# Patient Record
Sex: MALE | Race: WHITE | NOT HISPANIC OR LATINO | URBAN - METROPOLITAN AREA
[De-identification: names, ages, dates, MRNs, and addresses within clinical notes are randomized per-mention and may not be internally consistent; named-entity substitution may affect disease eponyms.]

---

## 2018-02-06 ENCOUNTER — INPATIENT (INPATIENT)
Facility: HOSPITAL | Age: 68
LOS: 8 days | Discharge: HOME | DRG: 65 | End: 2018-02-15
Attending: NEUROLOGICAL SURGERY | Admitting: NEUROLOGICAL SURGERY
Payer: MEDICAID

## 2018-02-06 VITALS
SYSTOLIC BLOOD PRESSURE: 137 MMHG | DIASTOLIC BLOOD PRESSURE: 66 MMHG | OXYGEN SATURATION: 99 % | RESPIRATION RATE: 13 BRPM | HEART RATE: 86 BPM | WEIGHT: 151.9 LBS

## 2018-02-06 DIAGNOSIS — Z90.49 ACQUIRED ABSENCE OF OTHER SPECIFIED PARTS OF DIGESTIVE TRACT: Chronic | ICD-10-CM

## 2018-02-06 DIAGNOSIS — I60.9 NONTRAUMATIC SUBARACHNOID HEMORRHAGE, UNSPECIFIED: ICD-10-CM

## 2018-02-06 LAB
ANION GAP SERPL CALC-SCNC: 10 MMOL/L — SIGNIFICANT CHANGE UP (ref 5–17)
ANION GAP SERPL CALC-SCNC: 13 MMOL/L — SIGNIFICANT CHANGE UP (ref 5–17)
APTT BLD: 35.6 SEC — SIGNIFICANT CHANGE UP (ref 27.5–37.4)
BLD GP AB SCN SERPL QL: NEGATIVE — SIGNIFICANT CHANGE UP
BUN SERPL-MCNC: 12 MG/DL — SIGNIFICANT CHANGE UP (ref 7–23)
BUN SERPL-MCNC: 12 MG/DL — SIGNIFICANT CHANGE UP (ref 7–23)
CALCIUM SERPL-MCNC: 8.5 MG/DL — SIGNIFICANT CHANGE UP (ref 8.4–10.5)
CALCIUM SERPL-MCNC: 8.9 MG/DL — SIGNIFICANT CHANGE UP (ref 8.4–10.5)
CHLORIDE SERPL-SCNC: 94 MMOL/L — LOW (ref 96–108)
CHLORIDE SERPL-SCNC: 97 MMOL/L — SIGNIFICANT CHANGE UP (ref 96–108)
CO2 SERPL-SCNC: 24 MMOL/L — SIGNIFICANT CHANGE UP (ref 22–31)
CO2 SERPL-SCNC: 25 MMOL/L — SIGNIFICANT CHANGE UP (ref 22–31)
CREAT SERPL-MCNC: 0.57 MG/DL — SIGNIFICANT CHANGE UP (ref 0.5–1.3)
CREAT SERPL-MCNC: 0.61 MG/DL — SIGNIFICANT CHANGE UP (ref 0.5–1.3)
GLUCOSE SERPL-MCNC: 257 MG/DL — HIGH (ref 70–99)
GLUCOSE SERPL-MCNC: 336 MG/DL — HIGH (ref 70–99)
HCT VFR BLD CALC: 35.2 % — LOW (ref 39–50)
HGB BLD-MCNC: 12.4 G/DL — LOW (ref 13–17)
INR BLD: 1.11 — SIGNIFICANT CHANGE UP (ref 0.88–1.16)
MAGNESIUM SERPL-MCNC: 2 MG/DL — SIGNIFICANT CHANGE UP (ref 1.6–2.6)
MCHC RBC-ENTMCNC: 31.9 PG — SIGNIFICANT CHANGE UP (ref 27–34)
MCHC RBC-ENTMCNC: 35.2 G/DL — SIGNIFICANT CHANGE UP (ref 32–36)
MCV RBC AUTO: 90.5 FL — SIGNIFICANT CHANGE UP (ref 80–100)
PCP SPEC-MCNC: SIGNIFICANT CHANGE UP
PHOSPHATE SERPL-MCNC: 3.1 MG/DL — SIGNIFICANT CHANGE UP (ref 2.5–4.5)
PLATELET # BLD AUTO: 265 K/UL — SIGNIFICANT CHANGE UP (ref 150–400)
POTASSIUM SERPL-MCNC: 3.9 MMOL/L — SIGNIFICANT CHANGE UP (ref 3.5–5.3)
POTASSIUM SERPL-MCNC: 4.2 MMOL/L — SIGNIFICANT CHANGE UP (ref 3.5–5.3)
POTASSIUM SERPL-SCNC: 3.9 MMOL/L — SIGNIFICANT CHANGE UP (ref 3.5–5.3)
POTASSIUM SERPL-SCNC: 4.2 MMOL/L — SIGNIFICANT CHANGE UP (ref 3.5–5.3)
PROTHROM AB SERPL-ACNC: 12.3 SEC — SIGNIFICANT CHANGE UP (ref 9.8–12.7)
RBC # BLD: 3.89 M/UL — LOW (ref 4.2–5.8)
RBC # FLD: 12 % — SIGNIFICANT CHANGE UP (ref 10.3–16.9)
RH IG SCN BLD-IMP: POSITIVE — SIGNIFICANT CHANGE UP
SODIUM SERPL-SCNC: 131 MMOL/L — LOW (ref 135–145)
SODIUM SERPL-SCNC: 132 MMOL/L — LOW (ref 135–145)
WBC # BLD: 12.8 K/UL — HIGH (ref 3.8–10.5)
WBC # FLD AUTO: 12.8 K/UL — HIGH (ref 3.8–10.5)

## 2018-02-06 PROCEDURE — 70496 CT ANGIOGRAPHY HEAD: CPT | Mod: 26

## 2018-02-06 PROCEDURE — 99291 CRITICAL CARE FIRST HOUR: CPT

## 2018-02-06 PROCEDURE — 36224 PLACE CATH CAROTD ART: CPT | Mod: 50

## 2018-02-06 PROCEDURE — 36226 PLACE CATH VERTEBRAL ART: CPT | Mod: 50

## 2018-02-06 PROCEDURE — 93306 TTE W/DOPPLER COMPLETE: CPT | Mod: 26

## 2018-02-06 PROCEDURE — 71045 X-RAY EXAM CHEST 1 VIEW: CPT | Mod: 26

## 2018-02-06 PROCEDURE — 70450 CT HEAD/BRAIN W/O DYE: CPT | Mod: 26,59

## 2018-02-06 PROCEDURE — 36227 PLACE CATH XTRNL CAROTID: CPT | Mod: 50

## 2018-02-06 PROCEDURE — 93970 EXTREMITY STUDY: CPT | Mod: 26

## 2018-02-06 RX ORDER — ACETAMINOPHEN 500 MG
1000 TABLET ORAL ONCE
Qty: 0 | Refills: 0 | Status: COMPLETED | OUTPATIENT
Start: 2018-02-06 | End: 2018-02-06

## 2018-02-06 RX ORDER — NICARDIPINE HYDROCHLORIDE 30 MG/1
5 CAPSULE, EXTENDED RELEASE ORAL
Qty: 40 | Refills: 0 | Status: DISCONTINUED | OUTPATIENT
Start: 2018-02-06 | End: 2018-02-07

## 2018-02-06 RX ORDER — TRAMADOL HYDROCHLORIDE 50 MG/1
50 TABLET ORAL EVERY 6 HOURS
Qty: 0 | Refills: 0 | Status: DISCONTINUED | OUTPATIENT
Start: 2018-02-06 | End: 2018-02-07

## 2018-02-06 RX ORDER — SENNA PLUS 8.6 MG/1
2 TABLET ORAL AT BEDTIME
Qty: 0 | Refills: 0 | Status: DISCONTINUED | OUTPATIENT
Start: 2018-02-06 | End: 2018-02-15

## 2018-02-06 RX ORDER — SODIUM CHLORIDE 5 G/100ML
1000 INJECTION, SOLUTION INTRAVENOUS
Qty: 0 | Refills: 0 | Status: DISCONTINUED | OUTPATIENT
Start: 2018-02-06 | End: 2018-02-06

## 2018-02-06 RX ORDER — VALPROIC ACID (AS SODIUM SALT) 250 MG/5ML
500 SOLUTION, ORAL ORAL EVERY 8 HOURS
Qty: 0 | Refills: 0 | Status: DISCONTINUED | OUTPATIENT
Start: 2018-02-06 | End: 2018-02-06

## 2018-02-06 RX ORDER — HYDROMORPHONE HYDROCHLORIDE 2 MG/ML
0.25 INJECTION INTRAMUSCULAR; INTRAVENOUS; SUBCUTANEOUS ONCE
Qty: 0 | Refills: 0 | Status: DISCONTINUED | OUTPATIENT
Start: 2018-02-06 | End: 2018-02-06

## 2018-02-06 RX ORDER — DESMOPRESSIN ACETATE 0.1 MG/1
28 TABLET ORAL ONCE
Qty: 0 | Refills: 0 | Status: COMPLETED | OUTPATIENT
Start: 2018-02-06 | End: 2018-02-06

## 2018-02-06 RX ORDER — ONDANSETRON 8 MG/1
4 TABLET, FILM COATED ORAL EVERY 6 HOURS
Qty: 0 | Refills: 0 | Status: DISCONTINUED | OUTPATIENT
Start: 2018-02-06 | End: 2018-02-15

## 2018-02-06 RX ORDER — SODIUM CHLORIDE 9 MG/ML
1000 INJECTION INTRAMUSCULAR; INTRAVENOUS; SUBCUTANEOUS
Qty: 0 | Refills: 0 | Status: DISCONTINUED | OUTPATIENT
Start: 2018-02-06 | End: 2018-02-06

## 2018-02-06 RX ORDER — INSULIN LISPRO 100/ML
VIAL (ML) SUBCUTANEOUS EVERY 6 HOURS
Qty: 0 | Refills: 0 | Status: DISCONTINUED | OUTPATIENT
Start: 2018-02-06 | End: 2018-02-14

## 2018-02-06 RX ORDER — SODIUM CHLORIDE 9 MG/ML
1000 INJECTION INTRAMUSCULAR; INTRAVENOUS; SUBCUTANEOUS
Qty: 0 | Refills: 0 | Status: DISCONTINUED | OUTPATIENT
Start: 2018-02-06 | End: 2018-02-07

## 2018-02-06 RX ORDER — LEVETIRACETAM 250 MG/1
500 TABLET, FILM COATED ORAL
Qty: 0 | Refills: 0 | Status: DISCONTINUED | OUTPATIENT
Start: 2018-02-06 | End: 2018-02-06

## 2018-02-06 RX ORDER — HUMAN INSULIN 100 [IU]/ML
15 INJECTION, SUSPENSION SUBCUTANEOUS ONCE
Qty: 0 | Refills: 0 | Status: DISCONTINUED | OUTPATIENT
Start: 2018-02-06 | End: 2018-02-06

## 2018-02-06 RX ORDER — LEVETIRACETAM 250 MG/1
500 TABLET, FILM COATED ORAL
Qty: 0 | Refills: 0 | Status: DISCONTINUED | OUTPATIENT
Start: 2018-02-06 | End: 2018-02-07

## 2018-02-06 RX ORDER — INFLUENZA VIRUS VACCINE 15; 15; 15; 15 UG/.5ML; UG/.5ML; UG/.5ML; UG/.5ML
0.5 SUSPENSION INTRAMUSCULAR ONCE
Qty: 0 | Refills: 0 | Status: DISCONTINUED | OUTPATIENT
Start: 2018-02-06 | End: 2018-02-15

## 2018-02-06 RX ORDER — NICARDIPINE HYDROCHLORIDE 30 MG/1
5 CAPSULE, EXTENDED RELEASE ORAL
Qty: 40 | Refills: 0 | Status: DISCONTINUED | OUTPATIENT
Start: 2018-02-06 | End: 2018-02-06

## 2018-02-06 RX ORDER — INSULIN GLARGINE 100 [IU]/ML
15 INJECTION, SOLUTION SUBCUTANEOUS AT BEDTIME
Qty: 0 | Refills: 0 | Status: DISCONTINUED | OUTPATIENT
Start: 2018-02-06 | End: 2018-02-09

## 2018-02-06 RX ORDER — NIMODIPINE 60 MG/10ML
60 SOLUTION ORAL EVERY 4 HOURS
Qty: 0 | Refills: 0 | Status: DISCONTINUED | OUTPATIENT
Start: 2018-02-06 | End: 2018-02-07

## 2018-02-06 RX ORDER — DOCUSATE SODIUM 100 MG
100 CAPSULE ORAL THREE TIMES A DAY
Qty: 0 | Refills: 0 | Status: DISCONTINUED | OUTPATIENT
Start: 2018-02-06 | End: 2018-02-15

## 2018-02-06 RX ORDER — METOCLOPRAMIDE HCL 10 MG
5 TABLET ORAL EVERY 6 HOURS
Qty: 0 | Refills: 0 | Status: DISCONTINUED | OUTPATIENT
Start: 2018-02-06 | End: 2018-02-15

## 2018-02-06 RX ADMIN — Medication 400 MILLIGRAM(S): at 16:26

## 2018-02-06 RX ADMIN — Medication 1000 MILLIGRAM(S): at 08:06

## 2018-02-06 RX ADMIN — NICARDIPINE HYDROCHLORIDE 25 MG/HR: 30 CAPSULE, EXTENDED RELEASE ORAL at 10:00

## 2018-02-06 RX ADMIN — NIMODIPINE 60 MILLIGRAM(S): 60 SOLUTION ORAL at 06:54

## 2018-02-06 RX ADMIN — Medication 2: at 18:00

## 2018-02-06 RX ADMIN — Medication 8: at 06:54

## 2018-02-06 RX ADMIN — Medication 1 CAPSULE(S): at 19:34

## 2018-02-06 RX ADMIN — NIMODIPINE 60 MILLIGRAM(S): 60 SOLUTION ORAL at 21:14

## 2018-02-06 RX ADMIN — LEVETIRACETAM 500 MILLIGRAM(S): 250 TABLET, FILM COATED ORAL at 18:46

## 2018-02-06 RX ADMIN — NIMODIPINE 60 MILLIGRAM(S): 60 SOLUTION ORAL at 10:00

## 2018-02-06 RX ADMIN — TRAMADOL HYDROCHLORIDE 50 MILLIGRAM(S): 50 TABLET ORAL at 20:08

## 2018-02-06 RX ADMIN — Medication 100 MILLIGRAM(S): at 21:15

## 2018-02-06 RX ADMIN — SODIUM CHLORIDE 75 MILLILITER(S): 9 INJECTION INTRAMUSCULAR; INTRAVENOUS; SUBCUTANEOUS at 10:00

## 2018-02-06 RX ADMIN — Medication 1 CAPSULE(S): at 11:17

## 2018-02-06 RX ADMIN — Medication 4: at 12:00

## 2018-02-06 RX ADMIN — Medication 1 CAPSULE(S): at 10:36

## 2018-02-06 RX ADMIN — HYDROMORPHONE HYDROCHLORIDE 0.25 MILLIGRAM(S): 2 INJECTION INTRAMUSCULAR; INTRAVENOUS; SUBCUTANEOUS at 21:05

## 2018-02-06 RX ADMIN — Medication 1 CAPSULE(S): at 18:47

## 2018-02-06 RX ADMIN — SODIUM CHLORIDE 30 MILLILITER(S): 5 INJECTION, SOLUTION INTRAVENOUS at 07:25

## 2018-02-06 RX ADMIN — DESMOPRESSIN ACETATE 228 MICROGRAM(S): 0.1 TABLET ORAL at 06:45

## 2018-02-06 RX ADMIN — Medication 27.5 MILLIGRAM(S): at 16:15

## 2018-02-06 RX ADMIN — HYDROMORPHONE HYDROCHLORIDE 0.25 MILLIGRAM(S): 2 INJECTION INTRAMUSCULAR; INTRAVENOUS; SUBCUTANEOUS at 21:15

## 2018-02-06 RX ADMIN — INSULIN GLARGINE 15 UNIT(S): 100 INJECTION, SOLUTION SUBCUTANEOUS at 21:15

## 2018-02-06 RX ADMIN — NICARDIPINE HYDROCHLORIDE 25 MG/HR: 30 CAPSULE, EXTENDED RELEASE ORAL at 06:23

## 2018-02-06 RX ADMIN — Medication 1000 MILLIGRAM(S): at 17:15

## 2018-02-06 RX ADMIN — SENNA PLUS 2 TABLET(S): 8.6 TABLET ORAL at 21:15

## 2018-02-06 RX ADMIN — TRAMADOL HYDROCHLORIDE 50 MILLIGRAM(S): 50 TABLET ORAL at 19:35

## 2018-02-06 RX ADMIN — Medication 400 MILLIGRAM(S): at 06:20

## 2018-02-06 RX ADMIN — NIMODIPINE 60 MILLIGRAM(S): 60 SOLUTION ORAL at 18:47

## 2018-02-06 NOTE — PROGRESS NOTE ADULT - SUBJECTIVE AND OBJECTIVE BOX
NEUROCRITICAL CARE PROGRESS NOTE    BARBARA LOMBARDO   MRN-[id number]  Summary:  /  HPI:  66yo male presented to [facility] after sudden onset of severe HA at 10pm followed by collapsing. PT drank beer and took aspirin for the HA and then passed out- pt states his knees gave out but he did not lose conciousness. +photophobia +nuchal rigidity. No N/V, no fever/chills. No head trauma. CTH done in [facility] showed SAH HH2 [place] 4. Pt was transferred to [facility] for further care. (06 Feb 2018 06:14)  Has been having headaches for > 1 month.  He has been taking Advil and ASA recently.  He states he drinks 3 beers per week.  ETOH was 0.03 from [facility] (app).     S/Overnight events:  transferred from [facility]  HA better than yesterday.     Vital Signs Last 24 Hrs  T(C): 35.7 (06 Feb 2018 09:05), Max: 35.8 (06 Feb 2018 05:00)  T(F): 96.2 (06 Feb 2018 09:05), Max: 96.5 (06 Feb 2018 05:00)  HR: 76 (06 Feb 2018 13:00) (72 - 88)  BP: 124/56 (06 Feb 2018 13:00) (107/55 - 144/70)  BP(mean): 79 (06 Feb 2018 13:00) (68 - 93)  RR: 19 (06 Feb 2018 13:00) (12 - 32)  SpO2: 99% (06 Feb 2018 13:00) (95% - 100%)    I&O's Detail    05 Feb 2018 07:01  -  06 Feb 2018 07:00  --------------------------------------------------------  IN:    IV PiggyBack: 150 mL    niCARdipine Infusion: 127.5 mL    sodium chloride 2%: 30 mL  Total IN: 307.5 mL    OUT:    Voided: 700 mL  Total OUT: 700 mL    Total NET: -392.5 mL    06 Feb 2018 07:01  -  06 Feb 201 14:06  --------------------------------------------------------  IN:    niCARdipine Infusion: 87.5 mL    Platelets - Single Donor: 223 mL    sodium chloride 0.9%.: 300 mL    sodium chloride 2%: 60 mL  Total IN: 670.5 mL    OUT:    Voided: 325 mL  Total OUT: 325 mL    Total NET: 345.5 mL      NEURO EXAM    MS: Patient is awake, alert, fully oriented  CN:  pupils 2-3mm equal and briskly reactive to light, EOMs intact  MOTOR: muscle strength 5/5 on all 4 extremities  COORD:  FTN intact, HTS intact    LABS:                        12.4   12.8  )-----------( 265      ( 06 Feb 2018 05:09 )             35.2     02-06    Sodium, Serum: 131 mmol/L (02.06.18 @ 05:09)  132<L>  |  97  |  12  ----------------------------<  257<H>  4.2   |  25  |  0.61    Ca    8.5      06 Feb 2018 11:44  Phos  3.1     02-06  Mg     2.0     02-06    PT/INR - ( 06 Feb 2018 05:09 )   PT: 12.3 sec;   INR: 1.11     PTT - ( 06 Feb 2018 05:09 )  PTT:35.6 sec    CAPILLARY BLOOD GLUCOS    POCT Blood Glucose.: 246 mg/dL (06 Feb 2018 11:25)  POCT Blood Glucose.: 237 mg/dL (06 Feb 2018 10:19)  POCT Blood Glucose.: 305 mg/dL (06 Feb 2018 06:50)    Hemoglobin A1C, Whole Blood (02.06.18 @ 05:09)    Hemoglobin A1C, Whole Blood: 8.4:       Allergies    No Known Allergies    Intolerances    MEDICATIONS:  Antibiotics:    Neuro:  acetaminophen 300 mG/butalbital 50 mG/ caffeine 40 mG 1 Capsule(s) Oral every 6 hours PRN  metoclopramide Injectable 5 milliGRAM(s) IV Push every 6 hours PRN  ondansetron Injectable 4 milliGRAM(s) IV Push every 6 hours PRN  traMADol 50 milliGRAM(s) Oral every 6 hours PRN  valproate sodium IVPB 500 milliGRAM(s) IV Intermittent every 8 hours    Anticoagulation:    OTHER:  docusate sodium 100 milliGRAM(s) Oral three times a day  influenza   Vaccine 0.5 milliLiter(s) IntraMuscular once  insulin lispro (HumaLOG) corrective regimen sliding scale   SubCutaneous every 6 hours  insulin NPH human recombinant 15 Unit(s) SubCutaneous once  niCARdipine Infusion 5 mG/Hr IV Continuous <Continuous>  niMODipine 60 milliGRAM(s) Oral every 4 hours  senna 2 Tablet(s) Oral at bedtime    IVF:  sodium chloride 0.9%. 1000 milliLiter(s) IV Continuous <Continuous>

## 2018-02-06 NOTE — H&P ADULT - PROBLEM SELECTOR PLAN 1
-CTH/CTA  -Strict BP control <140  -start 2% for hyponatremia  -neurochecks q1hr  -ddavp and platelet transfusion for aspirin use  -tylenol prn HA  -nimodipine  -SAH precautions  -D/W

## 2018-02-06 NOTE — PROGRESS NOTE ADULT - ASSESSMENT
Post procedure angio day 1, negative for avm or aneurysm  -q1h neuro checks and NV checks  -flat x 4 hrs  -pain control as ordered  -keppra x 10 days  -ADAT  -monitor hyponatremia  -NS hydration  -SCDs only for now  -dw Dr. Rodriguez and ICU house staff

## 2018-02-06 NOTE — H&P ADULT - HISTORY OF PRESENT ILLNESS
68yo male presented to [facility] after sudden onset of severe HA at 10pm followed by collapsing. PT drank beer and took aspirin for the HA and then passed out- pt states his knees gave out but he did not lose conciousness. +photophobia +nuchal rigidity. No N/V, no fever/chills. No head trauma. CTH done in [facility] showed SAH HH2 [place] 4. Pt was transferred to [facility] for further care.

## 2018-02-06 NOTE — H&P ADULT - NSHPPHYSICALEXAM_GEN_ALL_CORE
Lethargic but easily arousable  AxOX3  PERRL  EOMI  Nuchal rigidity  No pronator drift  5/5 motor x4ext  sensory intact

## 2018-02-06 NOTE — PROGRESS NOTE ADULT - SUBJECTIVE AND OBJECTIVE BOX
Post op Note    Dx: traumatic SAH/IVH    67y    Male   s/p cath angio today and brought back to ICU for observation post op. Patient reports moderate headache post procedure. Denies any CP, SOB, nausea, vomiting.            T(C): 35.7 (02-06-18 @ 09:05), Max: 35.8 (02-06-18 @ 05:00)  HR: 66 (02-06-18 @ 16:34) (62 - 88)  BP: 122/47 (02-06-18 @ 16:34) (101/41 - 144/70)  RR: 18 (02-06-18 @ 16:34) (12 - 32)  SpO2: 100% (02-06-18 @ 16:34) (95% - 100%)  Wt(kg): --      Physical exam  Awake, alert, oriented x 2, self and date, PERRL, EOMI  Follows commands, speech clear  SIDDIQUI X4 with good strength   groin site dressing: C/D/I  pulses intact distally

## 2018-02-06 NOTE — BRIEF OPERATIVE NOTE - PRE-OP DX
Subarachnoid hemorrhage following injury, no loss of consciousness, initial encounter  02/06/2018  traumatic SAH  Active  Isaac Brown

## 2018-02-06 NOTE — BRIEF OPERATIVE NOTE - PROCEDURE
<<-----Click on this checkbox to enter Procedure Cerebral angiography  02/06/2018  diagnostic femoral cerebral catheter angiogram  Active  ASALVATORE

## 2018-02-06 NOTE — PATIENT PROFILE ADULT. - DOES PATIENT HAVE ADVANCE DIRECTIVE
Detail Level: Detailed
Consent: The patient's consent was obtained including but not limited to risks of crusting, scabbing, blistering, scarring, darker or lighter pigmentary change, recurrence, incomplete removal and infection.
Duration Of Freeze Thaw-Cycle (Seconds): 2
Render Post-Care Instructions In Note?: no
Number Of Freeze-Thaw Cycles: 1 freeze-thaw cycle
Post-Care Instructions: I reviewed with the patient in detail post-care instructions. Patient is to wear sunprotection, and avoid picking at any of the treated lesions. Pt may apply Vaseline to crusted or scabbing areas.
No

## 2018-02-06 NOTE — PROGRESS NOTE ADULT - ASSESSMENT
66yo M w SAH HH2, FG 4, neuro intact, c/o posterior headache and neck pain that is improved compared to yesterday.  CTA head/neck negative for aneurysm.     Keep in ICU for q 1 hr neuro checks, monitor for hydrocephalus and rebleed  keep SBP < 140, labetalol, hydralazine and cardene prn  4 vessel cath angiogram today  7 days of keppra  HA cocktail;  Reglan, VPA, Fioricet, Mg, Tramadol x 3 days, no narcotics  keep up right  NPO  check FLP, A1c= 9, TSH, U tox, EKG, TTE  Nimodipine, unless angiogram negative for anuerysm    Prophylaxis:  SUP  [ ] H2  [ ] PPI;  VTE/PE  [] heparin/Lovenox   [x] SCDs   Rehab:  [x] SLP  [x] PT/OT  [ ] Cog eval  Social: will update family  Dispo: [x] ICU  [ ] Step-down [ ] [facility]  Code Status:  [x] Full [ ] DNR/DNI

## 2018-02-06 NOTE — H&P ADULT - ATTENDING COMMENTS
Patient seen and examined by me. Agree with above. Catheter angiogram negative for SAH bleed source. Agree with ICU care, spasm/hydro watch.    Andi Slaughter M.D.  Neurosurgery

## 2018-02-07 LAB
ANION GAP SERPL CALC-SCNC: 12 MMOL/L — SIGNIFICANT CHANGE UP (ref 5–17)
BUN SERPL-MCNC: 10 MG/DL — SIGNIFICANT CHANGE UP (ref 7–23)
CALCIUM SERPL-MCNC: 8.5 MG/DL — SIGNIFICANT CHANGE UP (ref 8.4–10.5)
CHLORIDE SERPL-SCNC: 97 MMOL/L — SIGNIFICANT CHANGE UP (ref 96–108)
CO2 SERPL-SCNC: 25 MMOL/L — SIGNIFICANT CHANGE UP (ref 22–31)
CREAT SERPL-MCNC: 0.56 MG/DL — SIGNIFICANT CHANGE UP (ref 0.5–1.3)
GLUCOSE SERPL-MCNC: 174 MG/DL — HIGH (ref 70–99)
HBA1C BLD-MCNC: 8.5 % — HIGH (ref 4–5.6)
MAGNESIUM SERPL-MCNC: 2 MG/DL — SIGNIFICANT CHANGE UP (ref 1.6–2.6)
PHOSPHATE SERPL-MCNC: 2.9 MG/DL — SIGNIFICANT CHANGE UP (ref 2.5–4.5)
POTASSIUM SERPL-MCNC: 3.5 MMOL/L — SIGNIFICANT CHANGE UP (ref 3.5–5.3)
POTASSIUM SERPL-SCNC: 3.5 MMOL/L — SIGNIFICANT CHANGE UP (ref 3.5–5.3)
SODIUM SERPL-SCNC: 134 MMOL/L — LOW (ref 135–145)

## 2018-02-07 PROCEDURE — 70450 CT HEAD/BRAIN W/O DYE: CPT | Mod: 26

## 2018-02-07 PROCEDURE — 72156 MRI NECK SPINE W/O & W/DYE: CPT | Mod: 26

## 2018-02-07 PROCEDURE — 99222 1ST HOSP IP/OBS MODERATE 55: CPT

## 2018-02-07 PROCEDURE — 99233 SBSQ HOSP IP/OBS HIGH 50: CPT | Mod: 25

## 2018-02-07 PROCEDURE — 70553 MRI BRAIN STEM W/O & W/DYE: CPT | Mod: 26

## 2018-02-07 RX ORDER — VALPROIC ACID (AS SODIUM SALT) 250 MG/5ML
500 SOLUTION, ORAL ORAL EVERY 8 HOURS
Qty: 0 | Refills: 0 | Status: DISCONTINUED | OUTPATIENT
Start: 2018-02-07 | End: 2018-02-08

## 2018-02-07 RX ORDER — NIMODIPINE 60 MG/10ML
60 SOLUTION ORAL EVERY 4 HOURS
Qty: 0 | Refills: 0 | Status: DISCONTINUED | OUTPATIENT
Start: 2018-02-07 | End: 2018-02-13

## 2018-02-07 RX ORDER — HYDRALAZINE HCL 50 MG
10 TABLET ORAL EVERY 6 HOURS
Qty: 0 | Refills: 0 | Status: DISCONTINUED | OUTPATIENT
Start: 2018-02-07 | End: 2018-02-15

## 2018-02-07 RX ORDER — HEPARIN SODIUM 5000 [USP'U]/ML
5000 INJECTION INTRAVENOUS; SUBCUTANEOUS EVERY 8 HOURS
Qty: 0 | Refills: 0 | Status: DISCONTINUED | OUTPATIENT
Start: 2018-02-07 | End: 2018-02-15

## 2018-02-07 RX ORDER — HYDROMORPHONE HYDROCHLORIDE 2 MG/ML
0.5 INJECTION INTRAMUSCULAR; INTRAVENOUS; SUBCUTANEOUS ONCE
Qty: 0 | Refills: 0 | Status: DISCONTINUED | OUTPATIENT
Start: 2018-02-07 | End: 2018-02-07

## 2018-02-07 RX ORDER — LEVETIRACETAM 250 MG/1
500 TABLET, FILM COATED ORAL
Qty: 0 | Refills: 0 | Status: DISCONTINUED | OUTPATIENT
Start: 2018-02-07 | End: 2018-02-08

## 2018-02-07 RX ORDER — BUTALBITAL/ASPIRIN/CAFFEINE 50-325-40
1 TABLET ORAL EVERY 4 HOURS
Qty: 0 | Refills: 0 | Status: DISCONTINUED | OUTPATIENT
Start: 2018-02-07 | End: 2018-02-08

## 2018-02-07 RX ORDER — SODIUM CHLORIDE 9 MG/ML
1000 INJECTION INTRAMUSCULAR; INTRAVENOUS; SUBCUTANEOUS
Qty: 0 | Refills: 0 | Status: DISCONTINUED | OUTPATIENT
Start: 2018-02-07 | End: 2018-02-08

## 2018-02-07 RX ORDER — LABETALOL HCL 100 MG
10 TABLET ORAL EVERY 6 HOURS
Qty: 0 | Refills: 0 | Status: DISCONTINUED | OUTPATIENT
Start: 2018-02-07 | End: 2018-02-14

## 2018-02-07 RX ORDER — TRAMADOL HYDROCHLORIDE 50 MG/1
50 TABLET ORAL EVERY 6 HOURS
Qty: 0 | Refills: 0 | Status: DISCONTINUED | OUTPATIENT
Start: 2018-02-07 | End: 2018-02-08

## 2018-02-07 RX ADMIN — Medication 27.5 MILLIGRAM(S): at 22:07

## 2018-02-07 RX ADMIN — TRAMADOL HYDROCHLORIDE 50 MILLIGRAM(S): 50 TABLET ORAL at 19:09

## 2018-02-07 RX ADMIN — Medication 1 CAPSULE(S): at 19:10

## 2018-02-07 RX ADMIN — Medication 1 CAPSULE(S): at 04:50

## 2018-02-07 RX ADMIN — Medication 1 CAPSULE(S): at 21:23

## 2018-02-07 RX ADMIN — Medication 1 CAPSULE(S): at 03:56

## 2018-02-07 RX ADMIN — NIMODIPINE 60 MILLIGRAM(S): 60 SOLUTION ORAL at 14:57

## 2018-02-07 RX ADMIN — TRAMADOL HYDROCHLORIDE 50 MILLIGRAM(S): 50 TABLET ORAL at 23:55

## 2018-02-07 RX ADMIN — HYDROMORPHONE HYDROCHLORIDE 0.5 MILLIGRAM(S): 2 INJECTION INTRAMUSCULAR; INTRAVENOUS; SUBCUTANEOUS at 16:31

## 2018-02-07 RX ADMIN — Medication 2: at 06:23

## 2018-02-07 RX ADMIN — Medication 100 MILLIGRAM(S): at 06:22

## 2018-02-07 RX ADMIN — HYDROMORPHONE HYDROCHLORIDE 0.5 MILLIGRAM(S): 2 INJECTION INTRAMUSCULAR; INTRAVENOUS; SUBCUTANEOUS at 15:51

## 2018-02-07 RX ADMIN — NIMODIPINE 60 MILLIGRAM(S): 60 SOLUTION ORAL at 22:03

## 2018-02-07 RX ADMIN — LEVETIRACETAM 500 MILLIGRAM(S): 250 TABLET, FILM COATED ORAL at 06:22

## 2018-02-07 RX ADMIN — Medication 4: at 11:30

## 2018-02-07 RX ADMIN — Medication 2: at 00:22

## 2018-02-07 RX ADMIN — Medication 100 MILLIGRAM(S): at 19:05

## 2018-02-07 RX ADMIN — NIMODIPINE 60 MILLIGRAM(S): 60 SOLUTION ORAL at 01:14

## 2018-02-07 RX ADMIN — HEPARIN SODIUM 5000 UNIT(S): 5000 INJECTION INTRAVENOUS; SUBCUTANEOUS at 22:05

## 2018-02-07 RX ADMIN — SENNA PLUS 2 TABLET(S): 8.6 TABLET ORAL at 22:04

## 2018-02-07 RX ADMIN — TRAMADOL HYDROCHLORIDE 50 MILLIGRAM(S): 50 TABLET ORAL at 21:23

## 2018-02-07 RX ADMIN — Medication 100 MILLIGRAM(S): at 22:06

## 2018-02-07 RX ADMIN — NIMODIPINE 60 MILLIGRAM(S): 60 SOLUTION ORAL at 06:22

## 2018-02-07 RX ADMIN — Medication 10 MILLIGRAM(S): at 12:09

## 2018-02-07 RX ADMIN — TRAMADOL HYDROCHLORIDE 50 MILLIGRAM(S): 50 TABLET ORAL at 23:00

## 2018-02-07 RX ADMIN — Medication 1 CAPSULE(S): at 21:22

## 2018-02-07 RX ADMIN — Medication 2: at 19:06

## 2018-02-07 RX ADMIN — INSULIN GLARGINE 15 UNIT(S): 100 INJECTION, SOLUTION SUBCUTANEOUS at 22:05

## 2018-02-07 RX ADMIN — NIMODIPINE 60 MILLIGRAM(S): 60 SOLUTION ORAL at 19:05

## 2018-02-07 RX ADMIN — LEVETIRACETAM 500 MILLIGRAM(S): 250 TABLET, FILM COATED ORAL at 19:08

## 2018-02-07 RX ADMIN — Medication 1 CAPSULE(S): at 12:15

## 2018-02-07 RX ADMIN — Medication 2: at 22:08

## 2018-02-07 NOTE — OCCUPATIONAL THERAPY INITIAL EVALUATION ADULT - PERTINENT HX OF CURRENT PROBLEM, REHAB EVAL
68yo M w SAH HH2, FG 4, neuro intact, c/o posterior headache and neck pain that is improved compared to yesterday.  CTA head/neck negative for aneurysm.

## 2018-02-07 NOTE — PROGRESS NOTE ADULT - SUBJECTIVE AND OBJECTIVE BOX
NEUROCRITICAL CARE PROGRESS NOTE    BARBARA LOMBARDO   MRN-[id number]  Summary:  /  HPI:  68yo male presented to [facility] after sudden onset of severe HA at 10pm followed by collapsing. PT drank beer and took aspirin for the HA and then passed out- pt states his knees gave out but he did not lose conciousness. +photophobia +nuchal rigidity. No N/V, no fever/chills. No head trauma. CTH done in [facility] showed SAH HH2 [place] 4. Pt was transferred to [facility] for further care. (06 Feb 2018 06:14)    S/Overnight events:  HA improved from yesterday. still severe and uncomfortable     Vital Signs Last 24 Hrs  T(C): 36.2 (07 Feb 2018 08:30), Max: 36.6 (06 Feb 2018 17:01)  T(F): 97.2 (07 Feb 2018 08:30), Max: 97.9 (06 Feb 2018 17:01)  HR: 64 (07 Feb 2018 13:00) (60 - 74)  BP: 150/79 (07 Feb 2018 13:00) (101/41 - 150/79)  BP(mean): 117 (07 Feb 2018 13:00) (54 - 117)  RR: 13 (07 Feb 2018 13:00) (10 - 27)  SpO2: 97% (07 Feb 2018 13:00) (95% - 100%)    I&O's Detail    06 Feb 2018 07:01  -  07 Feb 2018 07:00  --------------------------------------------------------  IN:    IV PiggyBack: 100 mL    niCARdipine Infusion: 335 mL    Platelets - Single Donor: 223 mL    sodium chloride 0.9%: 1650 mL    sodium chloride 2%: 60 mL    Solution: 110 mL  Total IN: 2478 mL    OUT:    Indwelling Catheter - Urethral: 1225 mL    Voided: 595 mL  Total OUT: 1820 mL    Total NET: 658 mL    LABS:                        12.1   11.7  )-----------( 264      ( 07 Feb 2018 13:01 )             34.6     02-07    134<L>  |  97  |  10  ----------------------------<  174<H>  3.5   |  25  |  0.56    Ca    8.5      07 Feb 2018 06:11  Phos  2.9     02-07  Mg     2.0     02-07      PT/INR - ( 06 Feb 2018 05:09 )   PT: 12.3 sec;   INR: 1.11       PTT - ( 06 Feb 2018 05:09 )  PTT:35.6 sec    CAPILLARY BLOOD GLUCOSE    POCT Blood Glucose.: 204 mg/dL (07 Feb 2018 10:55)  POCT Blood Glucose.: 177 mg/dL (07 Feb 2018 06:15)  POCT Blood Glucose.: 177 mg/dL (07 Feb 2018 01:16)  POCT Blood Glucose.: 175 mg/dL (06 Feb 2018 21:23)  POCT Blood Glucose.: 176 mg/dL (06 Feb 2018 17:22)  POCT Blood Glucose.: 168 mg/dL (06 Feb 2018 15:09)    Neuro:  acetaminophen 300 mG/butalbital 50 mG/ caffeine 40 mG 1 Capsule(s) Oral every 6 hours PRN  levETIRAcetam 500 milliGRAM(s) Oral two times a day  metoclopramide Injectable 5 milliGRAM(s) IV Push every 6 hours PRN  ondansetron Injectable 4 milliGRAM(s) IV Push every 6 hours PRN  traMADol 50 milliGRAM(s) Oral every 6 hours PRN    Anticoagulation:    OTHER:  docusate sodium 100 milliGRAM(s) Oral three times a day  hydrALAZINE Injectable 10 milliGRAM(s) IV Push every 6 hours PRN  influenza   Vaccine 0.5 milliLiter(s) IntraMuscular once  insulin glargine Injectable (LANTUS) 15 Unit(s) SubCutaneous at bedtime  insulin lispro (HumaLOG) corrective regimen sliding scale   SubCutaneous every 6 hours  labetalol Injectable 10 milliGRAM(s) IV Push every 6 hours PRN  niMODipine 60 milliGRAM(s) Oral every 4 hours  senna 2 Tablet(s) Oral at bedtime    IVF:  sodium chloride 0.9%. 1000 milliLiter(s) IV Continuous <Continuous>    NEURO EXAM    MS: Patient is awake, alert, fully oriented  CN:  pupils 2-3mm equal and briskly reactive to light, EOMs intact  MOTOR: muscle strength 5/5 on all 4 extremities  COORD:  FTN intact, HTS intact

## 2018-02-07 NOTE — PHYSICAL THERAPY INITIAL EVALUATION ADULT - ADDITIONAL COMMENTS
Patient reports independent with all ADLs/IADLs prior to admission. No HHA. Denies history of mechanical falls throughout. Patient reports independent with all ADLs/IADLs prior to admission. No HHA. Denies history of mechanical falls throughout. Patient is (R) hand dominant, wears visual aides occasionally.

## 2018-02-07 NOTE — PROGRESS NOTE ADULT - SUBJECTIVE AND OBJECTIVE BOX
HPI:  66yo male presented to [facility] after sudden onset of severe HA at 10pm followed by collapsing. PT drank beer and took aspirin for the HA and then passed out- pt states his knees gave out but he did not lose conciousness. +photophobia +nuchal rigidity. No N/V, no fever/chills. No head trauma. CTH done in [facility] showed SAH HH2 [place] 4. Pt was transferred to [facility] for further care. (06 Feb 2018 06:14)    s/p angio yesterday, neg for AVM and vasc abnormality  reports headaches consistently overnight  hyperglycemic yesterday started on lantus  denies any blurry vision, n/v, CP, SOB or difficulty breathing    OVERNIGHT EVENTS:  Vital Signs Last 24 Hrs  T(C): 36.1 (07 Feb 2018 06:10), Max: 36.6 (06 Feb 2018 17:01)  T(F): 96.9 (07 Feb 2018 06:10), Max: 97.9 (06 Feb 2018 17:01)  HR: 62 (07 Feb 2018 05:00) (62 - 88)  BP: 109/47 (07 Feb 2018 05:00) (101/41 - 134/69)  BP(mean): 81 (07 Feb 2018 05:00) (54 - 96)  RR: 10 (07 Feb 2018 05:00) (10 - 25)  SpO2: 100% (07 Feb 2018 05:00) (95% - 100%)    I&O's Detail    06 Feb 2018 07:01  -  07 Feb 2018 07:00  --------------------------------------------------------  IN:    IV PiggyBack: 100 mL    niCARdipine Infusion: 310 mL    Platelets - Single Donor: 223 mL    sodium chloride 0.9%.: 1500 mL    sodium chloride 2%: 60 mL    Solution: 110 mL  Total IN: 2303 mL    OUT:    Indwelling Catheter - Urethral: 1225 mL    Voided: 595 mL  Total OUT: 1820 mL    Total NET: 483 mL        I&O's Summary    06 Feb 2018 07:01  -  07 Feb 2018 07:00  --------------------------------------------------------  IN: 2303 mL / OUT: 1820 mL / NET: 483 mL        PHYSICAL EXAM:  Neurological:  A&O x 3, mod discomfort  EOMI, PERRL  face symmetric  SIDDIQUI x 4, neg drift  motor 5/5 throughout  groin site c/d/i, dsg removed, distal pulses intact    TUBES/LINES:  piv    DIET:  [] NPO  [x] Mechanical  [] Tube feeds    LABS:                        12.4   12.8  )-----------( 265      ( 06 Feb 2018 05:09 )             35.2     02-06    132<L>  |  97  |  12  ----------------------------<  257<H>  4.2   |  25  |  0.61    Ca    8.5      06 Feb 2018 11:44  Phos  3.1     02-06  Mg     2.0     02-06      PT/INR - ( 06 Feb 2018 05:09 )   PT: 12.3 sec;   INR: 1.11          PTT - ( 06 Feb 2018 05:09 )  PTT:35.6 sec        CAPILLARY BLOOD GLUCOSE      POCT Blood Glucose.: 177 mg/dL (07 Feb 2018 06:15)  POCT Blood Glucose.: 177 mg/dL (07 Feb 2018 01:16)  POCT Blood Glucose.: 175 mg/dL (06 Feb 2018 21:23)  POCT Blood Glucose.: 176 mg/dL (06 Feb 2018 17:22)  POCT Blood Glucose.: 168 mg/dL (06 Feb 2018 15:09)  POCT Blood Glucose.: 246 mg/dL (06 Feb 2018 11:25)  POCT Blood Glucose.: 237 mg/dL (06 Feb 2018 10:19)      Drug Levels: [] N/A    CSF Analysis: [] N/A      Allergies    No Known Allergies    Intolerances      MEDICATIONS:  Antibiotics:    Neuro:  acetaminophen 300 mG/butalbital 50 mG/ caffeine 40 mG 1 Capsule(s) Oral every 6 hours PRN  levETIRAcetam 500 milliGRAM(s) Oral two times a day  metoclopramide Injectable 5 milliGRAM(s) IV Push every 6 hours PRN  ondansetron Injectable 4 milliGRAM(s) IV Push every 6 hours PRN  traMADol 50 milliGRAM(s) Oral every 6 hours PRN    Anticoagulation:    OTHER:  docusate sodium 100 milliGRAM(s) Oral three times a day  influenza   Vaccine 0.5 milliLiter(s) IntraMuscular once  insulin glargine Injectable (LANTUS) 15 Unit(s) SubCutaneous at bedtime  insulin lispro (HumaLOG) corrective regimen sliding scale   SubCutaneous every 6 hours  niCARdipine Infusion 5 mG/Hr IV Continuous <Continuous>  niMODipine 60 milliGRAM(s) Oral every 4 hours  senna 2 Tablet(s) Oral at bedtime    IVF:  sodium chloride 0.9%. 1000 milliLiter(s) IV Continuous <Continuous>    CULTURES:    RADIOLOGY & ADDITIONAL TESTS:      ASSESSMENT:  68y Male s/p cath angio POD 1 with SAH neuro intact    PLAN:  NEURO:  q1h neuro checks  CTH follow up today  pain control    CARDIOVASCULAR:  cardene titrated to SBP < 140    PULMONARY:  RA    RENAL:  NS for hydration  aguirre out    GI:  mech soft diet  bowel regimen    HEME:  h/h stable    ID:  afeb  no abx    ENDO:  ISS + Lantus    DVT PROPHYLAXIS:  [x] Venodynes                                [] Heparin/Lovenox    DISPOSITION:   ICU status  full code  pt/ot pending  d/w Dr. Slaughter and ICU house staff HPI:  68yo male presented to [facility] after sudden onset of severe HA at 10pm followed by collapsing. PT drank beer and took aspirin for the HA and then passed out- pt states his knees gave out but he did not lose conciousness. +photophobia +nuchal rigidity. No N/V, no fever/chills. No head trauma. CTH done in [facility] showed SAH HH2 [place] 4. Pt was transferred to [facility] for further care. (06 Feb 2018 06:14)    s/p angio yesterday, neg for AVM and vasc abnormality  reports headaches consistently overnight  hyperglycemic yesterday started on lantus  denies any blurry vision, n/v, CP, SOB or difficulty breathing    OVERNIGHT EVENTS:  Vital Signs Last 24 Hrs  T(C): 36.1 (07 Feb 2018 06:10), Max: 36.6 (06 Feb 2018 17:01)  T(F): 96.9 (07 Feb 2018 06:10), Max: 97.9 (06 Feb 2018 17:01)  HR: 62 (07 Feb 2018 05:00) (62 - 88)  BP: 109/47 (07 Feb 2018 05:00) (101/41 - 134/69)  BP(mean): 81 (07 Feb 2018 05:00) (54 - 96)  RR: 10 (07 Feb 2018 05:00) (10 - 25)  SpO2: 100% (07 Feb 2018 05:00) (95% - 100%)    I&O's Detail    06 Feb 2018 07:01  -  07 Feb 2018 07:00  --------------------------------------------------------  IN:    IV PiggyBack: 100 mL    niCARdipine Infusion: 310 mL    Platelets - Single Donor: 223 mL    sodium chloride 0.9%.: 1500 mL    sodium chloride 2%: 60 mL    Solution: 110 mL  Total IN: 2303 mL    OUT:    Indwelling Catheter - Urethral: 1225 mL    Voided: 595 mL  Total OUT: 1820 mL    Total NET: 483 mL        I&O's Summary    06 Feb 2018 07:01  -  07 Feb 2018 07:00  --------------------------------------------------------  IN: 2303 mL / OUT: 1820 mL / NET: 483 mL        PHYSICAL EXAM:  Neurological:  A&O x 3, mod discomfort  EOMI, PERRL  face symmetric  SIDDIQUI x 4, neg drift  motor 5/5 throughout  groin site c/d/i, dsg removed, distal pulses intact    TUBES/LINES:  piv    DIET:  [] NPO  [x] Mechanical  [] Tube feeds    LABS:                        12.4   12.8  )-----------( 265      ( 06 Feb 2018 05:09 )             35.2     02-06    132<L>  |  97  |  12  ----------------------------<  257<H>  4.2   |  25  |  0.61    Ca    8.5      06 Feb 2018 11:44  Phos  3.1     02-06  Mg     2.0     02-06      PT/INR - ( 06 Feb 2018 05:09 )   PT: 12.3 sec;   INR: 1.11          PTT - ( 06 Feb 2018 05:09 )  PTT:35.6 sec        CAPILLARY BLOOD GLUCOSE      POCT Blood Glucose.: 177 mg/dL (07 Feb 2018 06:15)  POCT Blood Glucose.: 177 mg/dL (07 Feb 2018 01:16)  POCT Blood Glucose.: 175 mg/dL (06 Feb 2018 21:23)  POCT Blood Glucose.: 176 mg/dL (06 Feb 2018 17:22)  POCT Blood Glucose.: 168 mg/dL (06 Feb 2018 15:09)  POCT Blood Glucose.: 246 mg/dL (06 Feb 2018 11:25)  POCT Blood Glucose.: 237 mg/dL (06 Feb 2018 10:19)      Drug Levels: [] N/A    CSF Analysis: [] N/A      Allergies    No Known Allergies    Intolerances      MEDICATIONS:  Antibiotics:    Neuro:  acetaminophen 300 mG/butalbital 50 mG/ caffeine 40 mG 1 Capsule(s) Oral every 6 hours PRN  levETIRAcetam 500 milliGRAM(s) Oral two times a day  metoclopramide Injectable 5 milliGRAM(s) IV Push every 6 hours PRN  ondansetron Injectable 4 milliGRAM(s) IV Push every 6 hours PRN  traMADol 50 milliGRAM(s) Oral every 6 hours PRN    Anticoagulation:    OTHER:  docusate sodium 100 milliGRAM(s) Oral three times a day  influenza   Vaccine 0.5 milliLiter(s) IntraMuscular once  insulin glargine Injectable (LANTUS) 15 Unit(s) SubCutaneous at bedtime  insulin lispro (HumaLOG) corrective regimen sliding scale   SubCutaneous every 6 hours  niCARdipine Infusion 5 mG/Hr IV Continuous <Continuous>  niMODipine 60 milliGRAM(s) Oral every 4 hours  senna 2 Tablet(s) Oral at bedtime    IVF:  sodium chloride 0.9%. 1000 milliLiter(s) IV Continuous <Continuous>    CULTURES:    RADIOLOGY & ADDITIONAL TESTS:      ASSESSMENT:  68y Male s/p cath angio POD 1 with SAH neuro intact    PLAN:  NEURO:  q2h neuro checks  CTH follow up today  pain control    CARDIOVASCULAR:  dc cardene  liberate SBP goal to less than 160  labatolol/hydralazine prn    PULMONARY:  RA  enc IS use    RENAL:  NS for hydration, dc when taking PO  aguirre out    GI:  mech soft diet  bowel regimen    HEME:  h/h stable    ID:  afeb  no abx    ENDO:  ISS + Lantus    DVT PROPHYLAXIS:  [x] Venodynes                                [] Heparin/Lovenox    DISPOSITION:   SDU status  full code  pt/ot pending  d/w Dr. Slaughter and ICU house staff

## 2018-02-07 NOTE — PHYSICAL THERAPY INITIAL EVALUATION ADULT - PERTINENT HX OF CURRENT PROBLEM, REHAB EVAL
68yo male presented to [facility] after sudden onset of severe HA at 10pm followed by collapsing. PT drank beer and took aspirin for the HA and then passed out- pt states his knees gave out but he did not lose conciousness. +photophobia +nuchal rigidity. No N/V, no fever/chills. No head trauma. CTH done in [facility] showed SAH HH2 [place] 4. Pt was transferred to [facility] for further care. Please refer to H&P.

## 2018-02-07 NOTE — PHYSICAL THERAPY INITIAL EVALUATION ADULT - SENSORY TESTS
(R) hand dominant; (L) hand  5/5, (R) hand  5/5. CN Testing: B/L Frontalis intact; B/L buccinator intact; smile symmetrical; tongue protrusion at midline; B/L eyes open/close intact; Shoulder elevation: intact bilaterally; Vision H-Test: bilateral tracking and smooth pursuit intact; Convergence/Divergence: intact; Vision Quadrant Test: intact bilaterally. Rapid alternating movements: (R) hand dominant; (L) hand  5/5, (R) hand  5/5. CN Testing: B/L Frontalis intact; B/L buccinator intact; smile symmetrical; tongue protrusion at midline; B/L eyes open/close intact; Shoulder elevation: intact bilaterally; Vision H-Test: bilateral tracking and smooth pursuit intact; Convergence/Divergence: intact; Vision Quadrant Test: intact bilaterally. Rapid alternating movements: intact bilaterally

## 2018-02-07 NOTE — PROGRESS NOTE ADULT - ASSESSMENT
66yo M w SAH HH2, FG 4, neuro intact, c/o posterior headache and neck pain that is improved compared to yesterday.  CTA head/neck negative for aneurysm.     Keep in ICU for q 4 hr neuro checks, monitor for hydrocephalus and rebleed  keep SBP < 160, labetalol, hydralazine and cardene prn  7 days of keppra  HA cocktail;  Reglan, VPA, Fioricet, Mg, Tramadol x 3 days, no narcotics  keep up right  advance diet  stop nimodipine  step down unit    Prophylaxis:  SUP  [ ] H2  [ ] PPI;  VTE/PE  [] heparin/Lovenox   [x] SCDs   Rehab:  [x] SLP  [x] PT/OT  [ ] Cog eval  Social: will update family  Dispo: [x] ICU  [ ] Step-down [ ] [facility]  Code Status:  [x] Full [ ] DNR/DNI

## 2018-02-07 NOTE — PROGRESS NOTE ADULT - SUBJECTIVE AND OBJECTIVE BOX
S/Overnight events:  No overnight events.             Vital Signs Last 24 Hrs  T(C): 36.2 (07 Feb 2018 08:30), Max: 36.6 (06 Feb 2018 17:01)  T(F): 97.2 (07 Feb 2018 08:30), Max: 97.9 (06 Feb 2018 17:01)  HR: 60 (07 Feb 2018 07:00) (60 - 83)  BP: 109/47 (07 Feb 2018 05:00) (101/41 - 134/69)  BP(mean): 81 (07 Feb 2018 05:00) (54 - 96)  RR: 11 (07 Feb 2018 07:00) (10 - 20)  SpO2: 99% (07 Feb 2018 07:00) (95% - 100%)    I&O's Detail    06 Feb 2018 07:01  -  07 Feb 2018 07:00  --------------------------------------------------------  IN:    IV PiggyBack: 100 mL    niCARdipine Infusion: 335 mL    Platelets - Single Donor: 223 mL    sodium chloride 0.9%.: 1650 mL    sodium chloride 2%: 60 mL    Solution: 110 mL  Total IN: 2478 mL    OUT:    Indwelling Catheter - Urethral: 1225 mL    Voided: 595 mL  Total OUT: 1820 mL    Total NET: 658 mL        I&O's Summary    06 Feb 2018 07:01  -  07 Feb 2018 07:00  --------------------------------------------------------  IN: 2478 mL / OUT: 1820 mL / NET: 658 mL        PHYSICAL EXAM:      DEVICE/DRAIN DRESSING:    TUBES/LINES:  [] CVC  [] A-line  [] Lumbar Drain  [] Ventriculostomy  [] Other    DIET:  [] NPO  [] Mechanical  [] Tube feeds    LABS:                        12.4   12.8  )-----------( 265      ( 06 Feb 2018 05:09 )             35.2     02-07    134<L>  |  97  |  10  ----------------------------<  174<H>  3.5   |  25  |  0.56    Ca    8.5      07 Feb 2018 06:11  Phos  2.9     02-07  Mg     2.0     02-07      PT/INR - ( 06 Feb 2018 05:09 )   PT: 12.3 sec;   INR: 1.11          PTT - ( 06 Feb 2018 05:09 )  PTT:35.6 sec        CAPILLARY BLOOD GLUCOSE      POCT Blood Glucose.: 177 mg/dL (07 Feb 2018 06:15)  POCT Blood Glucose.: 177 mg/dL (07 Feb 2018 01:16)  POCT Blood Glucose.: 175 mg/dL (06 Feb 2018 21:23)  POCT Blood Glucose.: 176 mg/dL (06 Feb 2018 17:22)  POCT Blood Glucose.: 168 mg/dL (06 Feb 2018 15:09)  POCT Blood Glucose.: 246 mg/dL (06 Feb 2018 11:25)  POCT Blood Glucose.: 237 mg/dL (06 Feb 2018 10:19)      Drug Levels: [] N/A    CSF Analysis: [] N/A      Allergies    No Known Allergies    Intolerances      MEDICATIONS:  Antibiotics:    Neuro:  acetaminophen 300 mG/butalbital 50 mG/ caffeine 40 mG 1 Capsule(s) Oral every 6 hours PRN  levETIRAcetam 500 milliGRAM(s) Oral two times a day  metoclopramide Injectable 5 milliGRAM(s) IV Push every 6 hours PRN  ondansetron Injectable 4 milliGRAM(s) IV Push every 6 hours PRN  traMADol 50 milliGRAM(s) Oral every 6 hours PRN    Anticoagulation:    OTHER:  docusate sodium 100 milliGRAM(s) Oral three times a day  influenza   Vaccine 0.5 milliLiter(s) IntraMuscular once  insulin glargine Injectable (LANTUS) 15 Unit(s) SubCutaneous at bedtime  insulin lispro (HumaLOG) corrective regimen sliding scale   SubCutaneous every 6 hours  niCARdipine Infusion 5 mG/Hr IV Continuous <Continuous>  niMODipine 60 milliGRAM(s) Oral every 4 hours  senna 2 Tablet(s) Oral at bedtime    IVF:  sodium chloride 0.9%. 1000 milliLiter(s) IV Continuous <Continuous>    CULTURES:    RADIOLOGY & ADDITIONAL TESTS:      ASSESSMENT:  68y Male s/p        PLAN:  NEURO:    CARDIOVASCULAR:    PULMONARY:    RENAL:    GI:    HEME:    ID:    ENDO:    DVT PROPHYLAXIS:    DISPOSITION: S/Overnight events:  No overnight events.     Hospital Course:  Transferred here from [place] 2/6 overnight   2/6 day: CT showing SAH w no mass effect. Angio showed infundibular dilatation, no aneurysm, AVM or other pathology.   2/7: Reimaging showing decreased blood. Pt continuing to complain of headache, predominantly occipital. Nicardipine d/c'ed, BP meds PRN for parameter: systolic >160. Denies fever, chills, chest pain, SOB, nausea, vomiting, LE swelling or pain.       Vital Signs Last 24 Hrs  T(C): 36.2 (07 Feb 2018 08:30), Max: 36.6 (06 Feb 2018 17:01)  T(F): 97.2 (07 Feb 2018 08:30), Max: 97.9 (06 Feb 2018 17:01)  HR: 60 (07 Feb 2018 07:00) (60 - 83)  BP: 109/47 (07 Feb 2018 05:00) (101/41 - 134/69)  BP(mean): 81 (07 Feb 2018 05:00) (54 - 96)  RR: 11 (07 Feb 2018 07:00) (10 - 20)  SpO2: 99% (07 Feb 2018 07:00) (95% - 100%)    I&O's Detail    06 Feb 2018 07:01  -  07 Feb 2018 07:00  --------------------------------------------------------  IN:    IV PiggyBack: 100 mL    niCARdipine Infusion: 335 mL    Platelets - Single Donor: 223 mL    sodium chloride 0.9%.: 1650 mL    sodium chloride 2%: 60 mL    Solution: 110 mL  Total IN: 2478 mL    OUT:    Indwelling Catheter - Urethral: 1225 mL    Voided: 595 mL  Total OUT: 1820 mL    Total NET: 658 mL        I&O's Summary    06 Feb 2018 07:01  -  07 Feb 2018 07:00  --------------------------------------------------------  IN: 2478 mL / OUT: 1820 mL / NET: 658 mL        PHYSICAL EXAM:  General: Male pt in NAD. Calm, cooperative, pleasant.   HEENT: NC/AT. PERRL, EOM's intact. Poor dentition. MMM.   Cardio: S1 and S2 clear, RRR, no murmurs, gallops or rubs   Pulm: CTAB, no wheezes, rales or rhonchi  Abdomen: Soft, nontender, nondistended.  No masses or organomegaly. +BS   Skin: Warm and dry, no rashes or lesions. Site of femoral catheter insertion clean, no hematoma, erythema or swelling.   Neuro: Aox3. Affect appropriate and cognition intact. CNII-XII grossly intact. Strength 5/5 UE & LE. Sensation intact bilat.  Extremities: Distal pulses intact.         DEVICE/DRAIN DRESSING:  No devices or drains.     TUBES/LINES:  [] CVC  [] A-line  [] Lumbar Drain  [] Ventriculostomy  [] Other    DIET:  [] NPO  [] Mechanical  [] Tube feeds    LABS:                        12.4   12.8  )-----------( 265      ( 06 Feb 2018 05:09 )             35.2     02-07    134<L>  |  97  |  10  ----------------------------<  174<H>  3.5   |  25  |  0.56    Ca    8.5      07 Feb 2018 06:11  Phos  2.9     02-07  Mg     2.0     02-07      PT/INR - ( 06 Feb 2018 05:09 )   PT: 12.3 sec;   INR: 1.11          PTT - ( 06 Feb 2018 05:09 )  PTT:35.6 sec        CAPILLARY BLOOD GLUCOSE      POCT Blood Glucose.: 177 mg/dL (07 Feb 2018 06:15)  POCT Blood Glucose.: 177 mg/dL (07 Feb 2018 01:16)  POCT Blood Glucose.: 175 mg/dL (06 Feb 2018 21:23)  POCT Blood Glucose.: 176 mg/dL (06 Feb 2018 17:22)  POCT Blood Glucose.: 168 mg/dL (06 Feb 2018 15:09)  POCT Blood Glucose.: 246 mg/dL (06 Feb 2018 11:25)  POCT Blood Glucose.: 237 mg/dL (06 Feb 2018 10:19)      Drug Levels: [] N/A    CSF Analysis: [] N/A      Allergies    No Known Allergies    Intolerances      MEDICATIONS:  Antibiotics:    Neuro:  acetaminophen 300 mG/butalbital 50 mG/ caffeine 40 mG 1 Capsule(s) Oral every 6 hours PRN  levETIRAcetam 500 milliGRAM(s) Oral two times a day  metoclopramide Injectable 5 milliGRAM(s) IV Push every 6 hours PRN  ondansetron Injectable 4 milliGRAM(s) IV Push every 6 hours PRN  traMADol 50 milliGRAM(s) Oral every 6 hours PRN    Anticoagulation:    OTHER:  docusate sodium 100 milliGRAM(s) Oral three times a day  influenza   Vaccine 0.5 milliLiter(s) IntraMuscular once  insulin glargine Injectable (LANTUS) 15 Unit(s) SubCutaneous at bedtime  insulin lispro (HumaLOG) corrective regimen sliding scale   SubCutaneous every 6 hours  niCARdipine Infusion 5 mG/Hr IV Continuous <Continuous>  niMODipine 60 milliGRAM(s) Oral every 4 hours  senna 2 Tablet(s) Oral at bedtime    IVF:  sodium chloride 0.9%. 1000 milliLiter(s) IV Continuous <Continuous>    CULTURES:    RADIOLOGY & ADDITIONAL TESTS:      ASSESSMENT:  68y Male s/p SAH on 2/5 and 1 day post-procedure (angio) in ICU for observation.         PLAN:  -pt to move to step down     NEURO:  -q1h neuro checks and NV checks  -keppra x 10 days  -pain control as ordered    CARDIOVASCULAR:  -D/c nicardipine, transition to bp meds PRN w. standing orders     PULMONARY:  -O2 sats good on RA.    RENAL:  -monitor hyponatremia  -NS hydration    GI:  -ADAT    HEME:  -CBC stable, continue to monitor    ID:  -Afebrile, reports feeling well aside from headache.     ENDO:  -Receiving Lantus as bedtime with improvement in blood glucose readings.     DVT PROPHYLAXIS:  -SCDs for now     DISPOSITION: HPI:   67 y/o M presented to [place] after severe headache that caused him to fall to his knees. Denies head trauma, vomiting, LOC. Pt also reports previous 1-2 month hx of headaches. Just before his collapse, he had drank a beer and taken ASA 325mg and advil. Now 1 day post-procedure and continues to complain of headache despite pain management measures.     S/Overnight events:  No overnight events.     Hospital Course:  Transferred here from [place] 2/6 overnight   2/6 day: CT showing SAH w no mass effect. Angio showed infundibular dilatation, no aneurysm, AVM or other pathology.   2/7: Reimaging showing decreased blood. Pt continuing to complain of headache, predominantly occipital. Nicardipine d/c'ed, BP meds PRN for parameter: systolic >160. Denies fever, chills, chest pain, SOB, nausea, vomiting, LE swelling or pain.       Vital Signs Last 24 Hrs  T(C): 36.2 (07 Feb 2018 08:30), Max: 36.6 (06 Feb 2018 17:01)  T(F): 97.2 (07 Feb 2018 08:30), Max: 97.9 (06 Feb 2018 17:01)  HR: 60 (07 Feb 2018 07:00) (60 - 83)  BP: 109/47 (07 Feb 2018 05:00) (101/41 - 134/69)  BP(mean): 81 (07 Feb 2018 05:00) (54 - 96)  RR: 11 (07 Feb 2018 07:00) (10 - 20)  SpO2: 99% (07 Feb 2018 07:00) (95% - 100%)    I&O's Detail    06 Feb 2018 07:01  -  07 Feb 2018 07:00  --------------------------------------------------------  IN:    IV PiggyBack: 100 mL    niCARdipine Infusion: 335 mL    Platelets - Single Donor: 223 mL    sodium chloride 0.9%.: 1650 mL    sodium chloride 2%: 60 mL    Solution: 110 mL  Total IN: 2478 mL    OUT:    Indwelling Catheter - Urethral: 1225 mL    Voided: 595 mL  Total OUT: 1820 mL    Total NET: 658 mL        I&O's Summary    06 Feb 2018 07:01  -  07 Feb 2018 07:00  --------------------------------------------------------  IN: 2478 mL / OUT: 1820 mL / NET: 658 mL        PHYSICAL EXAM:  General: Male pt in NAD. Calm, cooperative, pleasant.   HEENT: NC/AT. PERRL, EOM's intact. Poor dentition. MMM.   Cardio: S1 and S2 clear, RRR, no murmurs, gallops or rubs   Pulm: CTAB, no wheezes, rales or rhonchi  Abdomen: Soft, nontender, nondistended.  No masses or organomegaly. +BS   Skin: Warm and dry, no rashes or lesions. Site of femoral catheter insertion clean, no hematoma, erythema or swelling.   Neuro: Aox3. Affect appropriate and cognition intact. CNII-XII grossly intact. Strength 5/5 UE & LE. Sensation intact bilat.  Extremities: Distal pulses intact.         DEVICE/DRAIN DRESSING:  No devices or drains.     TUBES/LINES:  [] CVC  [] A-line  [] Lumbar Drain  [] Ventriculostomy  [] Other    DIET:  [] NPO  [] Mechanical  [] Tube feeds    LABS:                        12.4   12.8  )-----------( 265      ( 06 Feb 2018 05:09 )             35.2     02-07    134<L>  |  97  |  10  ----------------------------<  174<H>  3.5   |  25  |  0.56    Ca    8.5      07 Feb 2018 06:11  Phos  2.9     02-07  Mg     2.0     02-07      PT/INR - ( 06 Feb 2018 05:09 )   PT: 12.3 sec;   INR: 1.11          PTT - ( 06 Feb 2018 05:09 )  PTT:35.6 sec        CAPILLARY BLOOD GLUCOSE      POCT Blood Glucose.: 177 mg/dL (07 Feb 2018 06:15)  POCT Blood Glucose.: 177 mg/dL (07 Feb 2018 01:16)  POCT Blood Glucose.: 175 mg/dL (06 Feb 2018 21:23)  POCT Blood Glucose.: 176 mg/dL (06 Feb 2018 17:22)  POCT Blood Glucose.: 168 mg/dL (06 Feb 2018 15:09)  POCT Blood Glucose.: 246 mg/dL (06 Feb 2018 11:25)  POCT Blood Glucose.: 237 mg/dL (06 Feb 2018 10:19)      Drug Levels: [] N/A    CSF Analysis: [] N/A      Allergies    No Known Allergies    Intolerances      MEDICATIONS:  Antibiotics:    Neuro:  acetaminophen 300 mG/butalbital 50 mG/ caffeine 40 mG 1 Capsule(s) Oral every 6 hours PRN  levETIRAcetam 500 milliGRAM(s) Oral two times a day  metoclopramide Injectable 5 milliGRAM(s) IV Push every 6 hours PRN  ondansetron Injectable 4 milliGRAM(s) IV Push every 6 hours PRN  traMADol 50 milliGRAM(s) Oral every 6 hours PRN    Anticoagulation:    OTHER:  docusate sodium 100 milliGRAM(s) Oral three times a day  influenza   Vaccine 0.5 milliLiter(s) IntraMuscular once  insulin glargine Injectable (LANTUS) 15 Unit(s) SubCutaneous at bedtime  insulin lispro (HumaLOG) corrective regimen sliding scale   SubCutaneous every 6 hours  niCARdipine Infusion 5 mG/Hr IV Continuous <Continuous>  niMODipine 60 milliGRAM(s) Oral every 4 hours  senna 2 Tablet(s) Oral at bedtime    IVF:  sodium chloride 0.9%. 1000 milliLiter(s) IV Continuous <Continuous>    CULTURES:    RADIOLOGY & ADDITIONAL TESTS:      ASSESSMENT:  68y Male s/p SAH on 2/5 and 1 day post-procedure (angio) in ICU for observation.         PLAN:  -pt to move to step down     NEURO:  -q2h neuro checks and NV checks  -keppra x 10 days  -pain control as ordered    CARDIOVASCULAR:  -D/c nicardipine, transition to bp meds PRN w. standing orders     PULMONARY:  -O2 sats good on RA.    RENAL:  -monitor hyponatremia  -NS hydration    GI:  -ADAT    HEME:  -CBC stable, continue to monitor    ID:  -Afebrile, reports feeling well aside from headache.     ENDO:  -Receiving Lantus as bedtime with improvement in blood glucose readings.     DVT PROPHYLAXIS:  -SCDs for now     DISPOSITION:

## 2018-02-07 NOTE — OCCUPATIONAL THERAPY INITIAL EVALUATION ADULT - MD ORDER
Per chart, 68yo male presented to [facility] after sudden onset of severe HA at 10pm followed by collapsing. PT drank beer and took aspirin for the HA and then passed out- pt states his knees gave out but he did not lose conciousness. +photophobia +nuchal rigidity. No N/V, no fever/chills. No head trauma. CTH done in [facility] showed SAH HH2 [place] 4. Pt was transferred to [facility] for further care.

## 2018-02-07 NOTE — OCCUPATIONAL THERAPY INITIAL EVALUATION ADULT - PLANNED THERAPY INTERVENTIONS, OT EVAL
balance training/cognitive, visual perceptual/ADL retraining/bed mobility training/transfer training/IADL retraining

## 2018-02-07 NOTE — OCCUPATIONAL THERAPY INITIAL EVALUATION ADULT - GENERAL OBSERVATIONS, REHAB EVAL
Right hand dominant. Chart reviewed, patient received semi-supine, NAD, +tele, +IV, +SCDs, reports headache, RN aware. Right hand dominant. Chart reviewed, patient cleared for OT eval by CARMEN Mahoney, received semi-supine, NAD, +tele, +IV, +SCDs, reports headache, RN aware.

## 2018-02-07 NOTE — PHYSICAL THERAPY INITIAL EVALUATION ADULT - GENERAL OBSERVATIONS, REHAB EVAL
Chart reviewed. IE Completed. Patient c/o of headache at rest however remained agreeable to PT. Patient received semi-supine, NAD, +tele, +IV, groin dressing C/D/I, CARMEN Sams cleared patient for treatment.

## 2018-02-07 NOTE — PHYSICAL THERAPY INITIAL EVALUATION ADULT - GAIT DEVIATIONS NOTED, PT EVAL
decreased ana/decreased step length/slightly unsteady gait, increased instability noted in attempts to increase speed; after ~60 feet, decreased eyes-open noted, patient denies dizziness however palor color noted, patient assist to chair and brought back to room in chair; systolic BP dropped 40 points as compared to reading in sitting, assisted back to bed; patient asymptomatic and BP increased 40 systolic points; ***PLEASE refer to vitals flow sheet for orthostatic recording (CARMEN Sams aware)

## 2018-02-08 LAB
ANION GAP SERPL CALC-SCNC: 10 MMOL/L — SIGNIFICANT CHANGE UP (ref 5–17)
ANION GAP SERPL CALC-SCNC: 16 MMOL/L — SIGNIFICANT CHANGE UP (ref 5–17)
APPEARANCE UR: CLEAR — SIGNIFICANT CHANGE UP
BILIRUB UR-MCNC: NEGATIVE — SIGNIFICANT CHANGE UP
BUN SERPL-MCNC: 11 MG/DL — SIGNIFICANT CHANGE UP (ref 7–23)
BUN SERPL-MCNC: 13 MG/DL — SIGNIFICANT CHANGE UP (ref 7–23)
CALCIUM SERPL-MCNC: 8.6 MG/DL — SIGNIFICANT CHANGE UP (ref 8.4–10.5)
CALCIUM SERPL-MCNC: 8.8 MG/DL — SIGNIFICANT CHANGE UP (ref 8.4–10.5)
CHLORIDE SERPL-SCNC: 94 MMOL/L — LOW (ref 96–108)
CHLORIDE SERPL-SCNC: 99 MMOL/L — SIGNIFICANT CHANGE UP (ref 96–108)
CO2 SERPL-SCNC: 22 MMOL/L — SIGNIFICANT CHANGE UP (ref 22–31)
CO2 SERPL-SCNC: 25 MMOL/L — SIGNIFICANT CHANGE UP (ref 22–31)
COLOR SPEC: YELLOW — SIGNIFICANT CHANGE UP
CREAT SERPL-MCNC: 0.54 MG/DL — SIGNIFICANT CHANGE UP (ref 0.5–1.3)
CREAT SERPL-MCNC: 0.61 MG/DL — SIGNIFICANT CHANGE UP (ref 0.5–1.3)
DIFF PNL FLD: NEGATIVE — SIGNIFICANT CHANGE UP
GLUCOSE SERPL-MCNC: 119 MG/DL — HIGH (ref 70–99)
GLUCOSE SERPL-MCNC: 147 MG/DL — HIGH (ref 70–99)
GLUCOSE UR QL: NEGATIVE — SIGNIFICANT CHANGE UP
HCT VFR BLD CALC: 38 % — LOW (ref 39–50)
HGB BLD-MCNC: 12.9 G/DL — LOW (ref 13–17)
KETONES UR-MCNC: 40 MG/DL
LEUKOCYTE ESTERASE UR-ACNC: NEGATIVE — SIGNIFICANT CHANGE UP
MAGNESIUM SERPL-MCNC: 2.1 MG/DL — SIGNIFICANT CHANGE UP (ref 1.6–2.6)
MCHC RBC-ENTMCNC: 31.2 PG — SIGNIFICANT CHANGE UP (ref 27–34)
MCHC RBC-ENTMCNC: 33.9 G/DL — SIGNIFICANT CHANGE UP (ref 32–36)
MCV RBC AUTO: 91.8 FL — SIGNIFICANT CHANGE UP (ref 80–100)
NITRITE UR-MCNC: NEGATIVE — SIGNIFICANT CHANGE UP
OSMOLALITY SERPL: 284 MOSM/KG — SIGNIFICANT CHANGE UP (ref 280–301)
OSMOLALITY UR: 389 MOSMOL/KG — SIGNIFICANT CHANGE UP (ref 100–650)
PH UR: 6.5 — SIGNIFICANT CHANGE UP (ref 5–8)
PLATELET # BLD AUTO: 294 K/UL — SIGNIFICANT CHANGE UP (ref 150–400)
POTASSIUM SERPL-MCNC: 3.7 MMOL/L — SIGNIFICANT CHANGE UP (ref 3.5–5.3)
POTASSIUM SERPL-MCNC: 3.8 MMOL/L — SIGNIFICANT CHANGE UP (ref 3.5–5.3)
POTASSIUM SERPL-SCNC: 3.7 MMOL/L — SIGNIFICANT CHANGE UP (ref 3.5–5.3)
POTASSIUM SERPL-SCNC: 3.8 MMOL/L — SIGNIFICANT CHANGE UP (ref 3.5–5.3)
PROT UR-MCNC: NEGATIVE MG/DL — SIGNIFICANT CHANGE UP
RBC # BLD: 4.14 M/UL — LOW (ref 4.2–5.8)
RBC # FLD: 11.9 % — SIGNIFICANT CHANGE UP (ref 10.3–16.9)
SODIUM SERPL-SCNC: 132 MMOL/L — LOW (ref 135–145)
SODIUM SERPL-SCNC: 134 MMOL/L — LOW (ref 135–145)
SP GR SPEC: 1.01 — SIGNIFICANT CHANGE UP (ref 1–1.03)
UROBILINOGEN FLD QL: 0.2 E.U./DL — SIGNIFICANT CHANGE UP
WBC # BLD: 10.6 K/UL — HIGH (ref 3.8–10.5)
WBC # FLD AUTO: 10.6 K/UL — HIGH (ref 3.8–10.5)

## 2018-02-08 PROCEDURE — 99291 CRITICAL CARE FIRST HOUR: CPT

## 2018-02-08 RX ORDER — ACETAMINOPHEN 500 MG
1000 TABLET ORAL ONCE
Qty: 0 | Refills: 0 | Status: COMPLETED | OUTPATIENT
Start: 2018-02-08 | End: 2018-02-09

## 2018-02-08 RX ORDER — SODIUM CHLORIDE 9 MG/ML
2 INJECTION INTRAMUSCULAR; INTRAVENOUS; SUBCUTANEOUS EVERY 8 HOURS
Qty: 0 | Refills: 0 | Status: DISCONTINUED | OUTPATIENT
Start: 2018-02-08 | End: 2018-02-10

## 2018-02-08 RX ORDER — ACETAMINOPHEN 500 MG
1000 TABLET ORAL ONCE
Qty: 0 | Refills: 0 | Status: COMPLETED | OUTPATIENT
Start: 2018-02-08 | End: 2018-02-08

## 2018-02-08 RX ORDER — SODIUM CHLORIDE 9 MG/ML
1000 INJECTION INTRAMUSCULAR; INTRAVENOUS; SUBCUTANEOUS
Qty: 0 | Refills: 0 | Status: DISCONTINUED | OUTPATIENT
Start: 2018-02-08 | End: 2018-02-11

## 2018-02-08 RX ADMIN — Medication 27.5 MILLIGRAM(S): at 14:06

## 2018-02-08 RX ADMIN — Medication 1 CAPSULE(S): at 06:13

## 2018-02-08 RX ADMIN — HEPARIN SODIUM 5000 UNIT(S): 5000 INJECTION INTRAVENOUS; SUBCUTANEOUS at 06:12

## 2018-02-08 RX ADMIN — Medication 100 MILLIGRAM(S): at 22:00

## 2018-02-08 RX ADMIN — NIMODIPINE 60 MILLIGRAM(S): 60 SOLUTION ORAL at 03:00

## 2018-02-08 RX ADMIN — SODIUM CHLORIDE 2 GRAM(S): 9 INJECTION INTRAMUSCULAR; INTRAVENOUS; SUBCUTANEOUS at 22:00

## 2018-02-08 RX ADMIN — TRAMADOL HYDROCHLORIDE 50 MILLIGRAM(S): 50 TABLET ORAL at 13:30

## 2018-02-08 RX ADMIN — Medication 10 MILLIGRAM(S): at 07:42

## 2018-02-08 RX ADMIN — Medication 1000 MILLIGRAM(S): at 16:00

## 2018-02-08 RX ADMIN — Medication 1 CAPSULE(S): at 07:17

## 2018-02-08 RX ADMIN — NIMODIPINE 60 MILLIGRAM(S): 60 SOLUTION ORAL at 21:59

## 2018-02-08 RX ADMIN — NIMODIPINE 60 MILLIGRAM(S): 60 SOLUTION ORAL at 14:06

## 2018-02-08 RX ADMIN — NIMODIPINE 60 MILLIGRAM(S): 60 SOLUTION ORAL at 18:48

## 2018-02-08 RX ADMIN — Medication 100 MILLIGRAM(S): at 06:12

## 2018-02-08 RX ADMIN — TRAMADOL HYDROCHLORIDE 50 MILLIGRAM(S): 50 TABLET ORAL at 12:00

## 2018-02-08 RX ADMIN — SENNA PLUS 2 TABLET(S): 8.6 TABLET ORAL at 21:59

## 2018-02-08 RX ADMIN — INSULIN GLARGINE 15 UNIT(S): 100 INJECTION, SOLUTION SUBCUTANEOUS at 21:58

## 2018-02-08 RX ADMIN — LEVETIRACETAM 500 MILLIGRAM(S): 250 TABLET, FILM COATED ORAL at 06:13

## 2018-02-08 RX ADMIN — Medication 1 CAPSULE(S): at 14:06

## 2018-02-08 RX ADMIN — HEPARIN SODIUM 5000 UNIT(S): 5000 INJECTION INTRAVENOUS; SUBCUTANEOUS at 14:05

## 2018-02-08 RX ADMIN — TRAMADOL HYDROCHLORIDE 50 MILLIGRAM(S): 50 TABLET ORAL at 06:12

## 2018-02-08 RX ADMIN — TRAMADOL HYDROCHLORIDE 50 MILLIGRAM(S): 50 TABLET ORAL at 07:49

## 2018-02-08 RX ADMIN — SODIUM CHLORIDE 75 MILLILITER(S): 9 INJECTION INTRAMUSCULAR; INTRAVENOUS; SUBCUTANEOUS at 16:10

## 2018-02-08 RX ADMIN — Medication 10 MILLIGRAM(S): at 14:43

## 2018-02-08 RX ADMIN — Medication 27.5 MILLIGRAM(S): at 06:14

## 2018-02-08 RX ADMIN — NIMODIPINE 60 MILLIGRAM(S): 60 SOLUTION ORAL at 06:13

## 2018-02-08 RX ADMIN — HEPARIN SODIUM 5000 UNIT(S): 5000 INJECTION INTRAVENOUS; SUBCUTANEOUS at 22:00

## 2018-02-08 RX ADMIN — SODIUM CHLORIDE 2 GRAM(S): 9 INJECTION INTRAMUSCULAR; INTRAVENOUS; SUBCUTANEOUS at 14:10

## 2018-02-08 RX ADMIN — Medication 400 MILLIGRAM(S): at 15:42

## 2018-02-08 RX ADMIN — Medication 100 MILLIGRAM(S): at 14:06

## 2018-02-08 RX ADMIN — Medication 1 CAPSULE(S): at 15:07

## 2018-02-08 RX ADMIN — NIMODIPINE 60 MILLIGRAM(S): 60 SOLUTION ORAL at 09:52

## 2018-02-08 NOTE — PROGRESS NOTE ADULT - ASSESSMENT
new Diabetes Mellitus    PLAN: Day 1  Day 3  Day 7  Day 14  Day 21   NEURO: neurochecks q1h, PRN pain meds with Tylenol / fioricet, tramadol RTC, VPA / pain consult  SAH:  TCD starting Day 4 for vasospasm surveillance, convetional angio on Tue, cont nimodipine 60 mg po q4h to be given for 21 days;   Hydrocephalus watch, no EVD currently  Seizure prophylaxis continue VPA  REHAB:  physical therapy evaluation and management    EARLY MOB:  HOB elevated, OOB to chair    PULM:  Room air, incentive spirometry  CARDIO:  SBP goal 100-160mm Hg  ENDO:  Blood sugar goals 140-180 mg/dL, start insulin sliding scale; A1C 8.5 lipid profile HDL 32  Total 172   GI:  docusate senna  DIET: switch to CCD  RENAL: maintain euvolemia, accurate Is and Os, increase IVF to 75cc/hr; start salt tabs 2g q8h, recheck BMP this afternoon; send hyponatremia labs  HEM/ONC: no coagulopathy (INR= )  VTE Prophylaxis: SCDs, SQH  ID: afebrile, no leukocytosis  Social: will update family    Patient at high risk for neurological deterioration or death due to:    Critical care time, excluding procedures: 60 minutes. 68M with   1.  subarachnoid hemorrhage, s/p angio (02/06/18, Dr. Rodriguez); R Pcomm infundibular dilatation  new Diabetes Mellitus    PLAN: Day 1 02/06  Day 3 02/08  Day 7 02/12  Day 14 02/19   Day 21  02/26  NEURO: neurochecks q1h, PRN pain meds with Tylenol / fioricet, tramadol RTC, VPA / pain consult  SAH:  TCD starting Day 4 for vasospasm surveillance, conventional angio on Tue, cont nimodipine 60 mg po q4h to be given for 21 days (last day 02/26)  Hydrocephalus watch, no EVD currently  Seizure prophylaxis continue VPA, d/c levetiracetam   REHAB:  physical therapy evaluation and management    EARLY MOB:  HOB elevated, OOB to chair    PULM:  Room air, incentive spirometry  CARDIO:  SBP goal 100-160mm Hg  ENDO:  Blood sugar goals 140-180 mg/dL, cont insulin sliding scale, lantus 15 units HS; A1C 8.5 lipid profile HDL 32  Total 172   GI:  docusate senna  DIET: switch to CCD  RENAL: maintain euvolemia, accurate Is and Os, increase IVF to 75cc/hr; start salt tabs 2g q8h, recheck BMP this afternoon; send hyponatremia labs  HEM/ONC: no coagulopathy (INR=1.11)  VTE Prophylaxis: SCDs, SQH  ID: afebrile, no leukocytosis  Social: will update family    Patient at high risk for neurological deterioration or death due to:    Critical care time, excluding procedures: 60 minutes.

## 2018-02-08 NOTE — CONSULT NOTE ADULT - SUBJECTIVE AND OBJECTIVE BOX
NEUROSURGERY PAIN MANAGEMENT CONSULT NOTE    Incomplete note below    OVERNIGHT EVENTS:      PLAN/RECOMMENDATIONS:    CALL NP: [phone] for any acute changes in pain throughout day, significant difficulties with , or side effects/adverse effects with pain medications  CALL Dr Bala Amaral for weekend call: [phone]    REVIEW OF SYSTEMS:  CONSTITUTIONAL: __ fever or fatigue O/N.   NECK/ENT: No pain or stiffness. No throat pain  RESPIRATORY: No cough, wheezing; No shortness of breath  CARDIOVASCULAR: No chest pain, palpitations.   GASTROINTESTINAL: Pt reports ___ passing gas. No BM since ___ . No abdominal or epigastric pain. No nausea, vomiting. GENITOURINARY: No dysuria, frequency, or incontinence. ___ [place] on ___  NEUROLOGICAL: headaches, ____ loss of strength, ____ numbness, or tremors. No dizziness or lightheadedness with pain medications.   MUSCULOSKELETAL: Incisional back pain. ___ radicular pain in ___ pattern. ____ muscle stiffness/cramping. __joint pain or swelling.    FUNCTIONAL ASSESSMENT:  PAIN SCORE AT REST:         SCALE USED: (1-10 VNRS)  PAIN SCORE WITH ACTIVITY:         SCALE USED: (1-10 VNRS)    PAIN ASSESSMENT:    PHYSICAL EXAM  GENERAL: NAD, pt ___  NECK: Supple, ___ collar  NERVOUS SYSTEM:    Alert & Oriented X3, Good concentration;   Cranial nerves grossly intact  Motor exam:         [] Upper extremity            Bi(c5)  WE(c6)  EE(c7)   FF(c8)                                                R         5/5        5/5        5/5       5/5                                               L          5/5        5/5        5/5       5/5         [] Lower extremity          HF(l2)   KE(l3)    TA(l4)   EHL(l5)  GS(s1)                                                 R        5/5        5/5        5/5       5/5         5/5                                               L         5/5        5/5       5/5       5/5          5/5         [] warm well perfused; capillary refill <3 seconds   Sensation intact to LT in UE/LE in 3 dermatomes    Cervical: No facet tenderness. Negative Spurlings sign. Negative Velasco's sign. Cervical ROM not assessed, s/p surgery and restricted turning.    Lumbar: Incisional tenderness. Neg SLR __ . Negative XSLR ___ . Lumbar ROM not assessed, s/p surgery and restricted turning.    CHEST/LUNG: Clear to auscultation bilaterally; No rales, rhonchi, wheezing, or rubs  HEART: Regular rate and rhythm; No murmurs, rubs, or gallops  ABDOMEN: Soft, Nontender, Nondistended; Bowel sounds present  EXTREMITIES:  2+ Peripheral Pulses, No clubbing, cyanosis, or edema  SKIN: No rashes or lesions. Incision _____.      ASSESSMENT:       PLAN:   1. Opioids  Since yesterday 6am, pt has required:     PCA Setting:   - Opioids:   - Initial Demand:   - Lockout:   - 4 hour limit:     PLAN: C/w . Transition to . LA not required. Pt receiving adequete coverage. First step. For rescue dosing, will order .      2. Neuropathics: Pt currently denies neuropathic pain. No numbness/tingling/electric shock like sensation in LE/UE b.l. PLAN:  No indication for neuropathic agents.     3. Adjuvants: Pt ___ complaining of muscle spasms/cramping in neck/back. Tylenol 650mg q6h PRN for Mild Pain. Pt on/not on Percocet. PLAN: C/w     4. Prophylactic:   Bowel regimen: Docusate Senna;  Addition of ____  Nausea PRN: Zofran PRN for Nausea, pt does not c/o current    5. Functional Goals: Pt will get OOB with PT today. Pt will resume previous level of activity without impairment from surgery.     6. Additional Consults: None recommended.     7. Additional Labs/Imaging: None recommended.     8. Follow up, Discharge Planning:   Patient is set for discharge to:   Discharge is pending:   Pain Management follow up plan: NEUROSURGERY PAIN MANAGEMENT CONSULT NOTE    Pain HPI:   66yo M w/ SAH, PBD #3. P/w sudden onset severe HA, w/ - LOC, + photophobia. CTH in [place] showed SAH HH2 [place] 4. Workup for SAH has so far been negative for , with plan for repeat angio next week. Pt continues to c/o severe 10/10 generalized throbbing, but this PM stating it is primarily frontal with associated nuchal rigidity. Denies associated sensory sensitivity (photophobia, sensitivity to sound). Pt denies relief from any pain medications. Requesting ice packs for head.     Hospital Course as follows per Neuro Progress note.:  2/6 day: CT showing SAH w no mass effect. Cerebral angio showed infundibular dilatation, no aneurysm, AVM or other pathology.   2/7: Reimaging showing decreased blood. Pt continuing to complain of headache, predominantly occipital. Nicardipine d/c'ed, BP meds PRN for parameter: systolic >160. Denies fever, chills, chest pain, SOB, nausea, vomiting, LE swelling or pain.   2/8: Headache improved from yesterday; pt still states is severe. No BM since before admission as well as significantly diminished appetite. Pt more lethargic than yesterday but continues to be arousable and oriented.     PLAN/RECOMMENDATIONS:  - Given labile neuro exam and risk of increasing opiates on level of sedation, would continue to recommend focus on conservative regimen with adjunctive medications/therapies and primary use of Fioricet . Risk of vasospasms low with Fioricet juvenal. in setting of no evidence of vasospasm so far<< risk of vasospasm than Imitrex.  - Dc Valproic Acid  - Dc Tramadol  - Increase frequency of Fioricet to q4h scheduled, with addition of IV Tylenol today (tomorrow no IV Tylenol, given pt will >4g/day Tylenol with addition)    CALL NP: [phone] for any acute changes in pain throughout day, significant difficulties with activity, or side effects/adverse effects with pain medications  CALL Dr Bala Amaral for weekend call: [phone]    Vital Signs Last 24 Hrs  T(C): 36.2 (08 Feb 2018 17:46), Max: 37 (08 Feb 2018 03:00)  T(F): 97.2 (08 Feb 2018 17:46), Max: 98.6 (08 Feb 2018 03:00)  HR: 68 (08 Feb 2018 19:00) (58 - 72)  BP: 135/67 (08 Feb 2018 19:00) (128/47 - 170/77)  BP(mean): 92 (08 Feb 2018 19:00) (83 - 122)  RR: 12 (08 Feb 2018 19:00) (10 - 22)  SpO2: 97% (08 Feb 2018 19:00) (94% - 99%)      REVIEW OF SYSTEMS:  CONSTITUTIONAL: No fever. Difficulty with sleep d/t pain   NECK/ENT: + neck pain, mild stiffness. No throat pain  RESPIRATORY: No cough, wheezing; No shortness of breath  CARDIOVASCULAR: No chest pain, palpitations.   GASTROINTESTINAL: Pt reports passing gas. No BM since admit. No abdominal or epigastric pain. No nausea, vomiting.   NEUROLOGICAL: Severe headaches, no loss of strength, no numbness, or tremors. No dizziness or lightheadedness with pain medications.     FUNCTIONAL ASSESSMENT:  PAIN SCORE AT REST:  10       SCALE USED: (1-10 VNRS)  PAIN SCORE WITH ACTIVITY:    10     SCALE USED: (1-10 VNRS)    PAIN ASSESSMENT:  See above    PHYSICAL EXAM  GENERAL: Pt moaning, in acute pain   NECK: Supple  NERVOUS SYSTEM:    Alert & Oriented X3, Distractible d/t pain, not lethargic on exam, making eye contact and FC  Cranial nerves grossly intact  Motor exam:  MAEx4, 5/5 in 4 extr.   SILT x 3 derm in UE/LE in 3 dermatomes  Cervical: No facet tenderness. + Nucal rigidity     ASSESSMENT:   68y Male s/p cerebral angio POD#2, with SAH PBD#3, neuro intact    PLAN:   1. Opioids  Since yesterday 6am, pt has been on scheduled Tramadol 50mg q6h, 1x 0.5mg Dilaudid. - Dc Tramadol, can given IVP Fentanyl if required breakthrough pain, 1st line, but would corroborate appropriateness with PE.     2. Neuropathics: Pt currently denies neuropathic pain. No numbness/tingling/electric shock like sensation in LE/UE b.l. PLAN:  No indication for neuropathic agents. Some evidence of opioid sparing effect in setting of SAH, but would continue to carefully titrate medications.     3. Adjuvants: Pt complaining of muscle stiffness in neck. Tylenol 650mg q6h PRN for Mild Pain. Pt on Fioricet. PLAN: C/t monitor, no muscle relaxants for now. FIoricet q4h scheduled.     4. Prophylactic:   Bowel regimen: Docusate Senna;  Addition of MOM if no BM by tomorrow  Nausea PRN: Zofran PRN for Nausea, pt does not c/o current    5. Functional Goals: Pt will get tolerate ADLs and rest with minimal limitations due to headache    6. Additional Consults: None recommended.     7. Additional Labs/Imaging: None recommended.     8. Follow up, Discharge Planning:   Patient is set for discharge to: Pending med plan, on vasospasm watch, workup for SAH.  Discharge is pending: Med plan  Pain Management follow up plan: F/u inpatient.

## 2018-02-08 NOTE — DIETITIAN INITIAL EVALUATION ADULT. - ENERGY NEEDS
70 kg ABW; 76 kg IBW; BMI 22; 178 cm; 92% of IBW.   ABW used due to pt between % of IBW.   needs 2/2 advanced age + post op.

## 2018-02-08 NOTE — DIETITIAN INITIAL EVALUATION ADULT. - OTHER INFO
67yoM with no PMH transferred from [place] for management of spontaneous SAH s/p angio 2/6. Observed pt did not eat breakfast 2/2 lethargy.

## 2018-02-08 NOTE — PROGRESS NOTE ADULT - SUBJECTIVE AND OBJECTIVE BOX
HPI: 68yo male presented to [facility] after sudden onset of severe HA at 10pm followed by collapsing. PT drank beer and took aspirin for the HA and then passed out- pt states his knees gave out but he did not lose conciousness. +photophobia +nuchal rigidity. No N/V, no fever/chills. No head trauma. CTH done in [facility] showed SAH HH2 [place] 4. Pt was transferred to [facility] for further care. (06 Feb 2018 06:14)    S/Overnight events:    Headache improved from yesterday; pt still states is severe. No BM since before admission as well as significantly diminished appetite. Pt more somnolent than yesterday but continues to be arousable.       Vital Signs Last 24 Hrs  T(C): 36.8 (08 Feb 2018 09:23), Max: 37.3 (07 Feb 2018 14:56)  T(F): 98.2 (08 Feb 2018 09:23), Max: 99.2 (07 Feb 2018 14:56)  HR: 72 (08 Feb 2018 11:12) (58 - 74)  BP: 152/74 (08 Feb 2018 11:12) (117/60 - 170/77)  BP(mean): 107 (08 Feb 2018 11:12) (83 - 118)  RR: 22 (08 Feb 2018 11:12) (10 - 22)  SpO2: 98% (08 Feb 2018 11:12) (94% - 99%)    I&O's Detail    07 Feb 2018 07:01  -  08 Feb 2018 07:00  --------------------------------------------------------  IN:    IV PiggyBack: 100 mL    Oral Fluid: 100 mL    sodium chloride 0.9%: 800 mL  Total IN: 1000 mL    OUT:    Voided: 1700 mL  Total OUT: 1700 mL    Total NET: -700 mL      08 Feb 2018 07:01  -  08 Feb 2018 11:36  --------------------------------------------------------  IN:    sodium chloride 0.9%: 200 mL  Total IN: 200 mL    OUT:    Voided: 375 mL  Total OUT: 375 mL    Total NET: -175 mL        I&O's Summary    07 Feb 2018 07:01  -  08 Feb 2018 07:00  --------------------------------------------------------  IN: 1000 mL / OUT: 1700 mL / NET: -700 mL    08 Feb 2018 07:01  -  08 Feb 2018 11:36  --------------------------------------------------------  IN: 200 mL / OUT: 375 mL / NET: -175 mL        PHYSICAL EXAM:  General: Male pt in NAD. Calm, cooperative. Lethargic but arousable to voice, responds verbally and follows commands.   HEENT: NC/AT. PERRL, EOM's intact. Poor dentition. MMM.   Cardio: S1 and S2 clear, RRR, no murmurs, gallops or rubs   Pulm: CTAB, no wheezes, rales or rhonchi  Abdomen: Soft, nontender, nondistended.  No masses or organomegaly. +BS   Skin: Warm and dry, no rashes or lesions. Site of femoral catheter insertion clean, no hematoma, erythema or swelling.   Neuro: Aox2. Lethargic. CNII-XII grossly intact. Strength 5/5 UE & LE. Sensation intact bilat.  Extremities: Distal pulses intact.         Incision/Wound: Femoral IV catheter site clean and without hematoma, erythema, warmth, draining or swelling.      TUBES/LINES: None   [] CVC  [] A-line  [] Lumbar Drain  [] Ventriculostomy  [] Other    DIET: Regular   [] NPO  [] Mechanical  [] Tube feeds    LABS:                        12.9   10.6  )-----------( 294      ( 08 Feb 2018 06:42 )             38.0     02-08    134<L>  |  99  |  11  ----------------------------<  119<H>  3.7   |  25  |  0.61    Ca    8.6      08 Feb 2018 06:43  Phos  2.9     02-07  Mg     2.1     02-08        CAPILLARY BLOOD GLUCOSE      POCT Blood Glucose.: 146 mg/dL (08 Feb 2018 10:46)  POCT Blood Glucose.: 109 mg/dL (08 Feb 2018 06:29)  POCT Blood Glucose.: 152 mg/dL (07 Feb 2018 21:31)  POCT Blood Glucose.: 180 mg/dL (07 Feb 2018 16:56)      Drug Levels: [] N/A    CSF Analysis: [] N/A      Allergies    No Known Allergies    Intolerances      MEDICATIONS:  Antibiotics:    Neuro:  acetaminophen 300 mG/butalbital 50 mG/ caffeine 40 mG 1 Capsule(s) Oral every 6 hours PRN  metoclopramide Injectable 5 milliGRAM(s) IV Push every 6 hours PRN  ondansetron Injectable 4 milliGRAM(s) IV Push every 6 hours PRN  traMADol 50 milliGRAM(s) Oral every 6 hours  valproate sodium IVPB 500 milliGRAM(s) IV Intermittent every 8 hours    Anticoagulation:  heparin  Injectable 5000 Unit(s) SubCutaneous every 8 hours    OTHER:  docusate sodium 100 milliGRAM(s) Oral three times a day  hydrALAZINE Injectable 10 milliGRAM(s) IV Push every 6 hours PRN  influenza   Vaccine 0.5 milliLiter(s) IntraMuscular once  insulin glargine Injectable (LANTUS) 15 Unit(s) SubCutaneous at bedtime  insulin lispro (HumaLOG) corrective regimen sliding scale   SubCutaneous every 6 hours  labetalol Injectable 10 milliGRAM(s) IV Push every 6 hours PRN  niMODipine 60 milliGRAM(s) Oral every 4 hours  senna 2 Tablet(s) Oral at bedtime    IVF:  sodium chloride 2 Gram(s) Oral every 8 hours  sodium chloride 0.9%. 1000 milliLiter(s) IV Continuous <Continuous>    RADIOLOGY & ADDITIONAL TESTS:  Transcranial doppler tomorrow   Repeat angio next week     ASSESSMENT:  68y Male s/p cath angio POD 3 with SAH, neuro intact    SUBARACHNOID HEMMORAGE  No h/o HF  No pertinent family history in first degree relatives  No pertinent past medical history  Subarachnoid hemorrhage following injury, no loss of consciousness, initial encounter  SAH (subarachnoid hemorrhage)  Cerebral angiography  S/P appendectomy      PLAN:    NEURO:  Neuro checks q1  Fiorocet, tramadol, valproic acid for pain management  Pain management to consult  Transcranial dopper tomorrow  Repeat angio next week  Seizure prophylaxis: VPA     CARDIOVASCULAR:  Nimodipine 60 q4h    PULMONARY:  O2 sats good on RA     RENAL:  NS for hydration until taking adequate amounts PO   Quintanilla out    GI:  No BM x 3 days - continue bowel regimen    HEME:  CBC stable     ID:  Afebrile, no antibiotics needed     ENDO:  ISS + lantus at bedtime.   Send hyponatremia labs     DVT PROPHYLAXIS:  Subcue heparin, SCDs     FALL RISK:  [] Low Risk                                    [] Impulsive    DISPOSITION:  Full code  PT/OT pending HPI: 68yo male presented to [facility] after sudden onset of severe HA at 10pm followed by collapsing. PT drank beer and took aspirin for the HA and then passed out- pt states his knees gave out but he did not lose conciousness. +photophobia +nuchal rigidity. No N/V, no fever/chills. No head trauma. CTH done in [facility] showed SAH HH2 [place] 4. Pt was transferred to [facility] for further care. (06 Feb 2018 06:14)    S/Overnight events:    Headache improved from yesterday; pt still states is severe. No BM since before admission as well as significantly diminished appetite. Pt more lethargic than yesterday but continues to be arousable and oriented.       Vital Signs Last 24 Hrs  T(C): 36.8 (08 Feb 2018 09:23), Max: 37.3 (07 Feb 2018 14:56)  T(F): 98.2 (08 Feb 2018 09:23), Max: 99.2 (07 Feb 2018 14:56)  HR: 72 (08 Feb 2018 11:12) (58 - 74)  BP: 152/74 (08 Feb 2018 11:12) (117/60 - 170/77)  BP(mean): 107 (08 Feb 2018 11:12) (83 - 118)  RR: 22 (08 Feb 2018 11:12) (10 - 22)  SpO2: 98% (08 Feb 2018 11:12) (94% - 99%)    I&O's Detail    07 Feb 2018 07:01  -  08 Feb 2018 07:00  --------------------------------------------------------  IN:    IV PiggyBack: 100 mL    Oral Fluid: 100 mL    sodium chloride 0.9%: 800 mL  Total IN: 1000 mL    OUT:    Voided: 1700 mL  Total OUT: 1700 mL    Total NET: -700 mL      08 Feb 2018 07:01  -  08 Feb 2018 11:36  --------------------------------------------------------  IN:    sodium chloride 0.9%: 200 mL  Total IN: 200 mL    OUT:    Voided: 375 mL  Total OUT: 375 mL    Total NET: -175 mL        I&O's Summary    07 Feb 2018 07:01  -  08 Feb 2018 07:00  --------------------------------------------------------  IN: 1000 mL / OUT: 1700 mL / NET: -700 mL    08 Feb 2018 07:01  -  08 Feb 2018 11:36  --------------------------------------------------------  IN: 200 mL / OUT: 375 mL / NET: -175 mL        PHYSICAL EXAM:  General: Male pt in NAD. Calm, cooperative. Lethargic but arousable to voice, responds verbally and follows commands.   HEENT: NC/AT. PERRL, EOM's intact. Poor dentition. MMM.   Cardio: S1 and S2 clear, RRR, no murmurs, gallops or rubs   Pulm: CTAB, no wheezes, rales or rhonchi  Abdomen: Soft, nontender, nondistended.  No masses or organomegaly. +BS   Skin: Warm and dry, no rashes or lesions. Site of femoral catheter insertion clean, no hematoma, erythema or swelling.   Neuro: Aox3. Lethargic. CNII-XII grossly intact. Face symmetrical. Strength 5/5 UE & LE. No UE or LE drift. Sensation intact bilat.  Extremities: Distal pulses intact.         Incision/Wound: Femoral IV catheter site clean and without hematoma, erythema, warmth, draining or swelling.      TUBES/LINES: None   [] CVC  [] A-line  [] Lumbar Drain  [] Ventriculostomy  [] Other    DIET: Regular   [] NPO  [] Mechanical  [] Tube feeds    LABS:                        12.9   10.6  )-----------( 294      ( 08 Feb 2018 06:42 )             38.0     02-08    134<L>  |  99  |  11  ----------------------------<  119<H>  3.7   |  25  |  0.61    Ca    8.6      08 Feb 2018 06:43  Phos  2.9     02-07  Mg     2.1     02-08        CAPILLARY BLOOD GLUCOSE      POCT Blood Glucose.: 146 mg/dL (08 Feb 2018 10:46)  POCT Blood Glucose.: 109 mg/dL (08 Feb 2018 06:29)  POCT Blood Glucose.: 152 mg/dL (07 Feb 2018 21:31)  POCT Blood Glucose.: 180 mg/dL (07 Feb 2018 16:56)      Drug Levels: [] N/A    CSF Analysis: [] N/A      Allergies    No Known Allergies    Intolerances      MEDICATIONS:  Antibiotics:    Neuro:  acetaminophen 300 mG/butalbital 50 mG/ caffeine 40 mG 1 Capsule(s) Oral every 6 hours PRN  metoclopramide Injectable 5 milliGRAM(s) IV Push every 6 hours PRN  ondansetron Injectable 4 milliGRAM(s) IV Push every 6 hours PRN  traMADol 50 milliGRAM(s) Oral every 6 hours  valproate sodium IVPB 500 milliGRAM(s) IV Intermittent every 8 hours    Anticoagulation:  heparin  Injectable 5000 Unit(s) SubCutaneous every 8 hours    OTHER:  docusate sodium 100 milliGRAM(s) Oral three times a day  hydrALAZINE Injectable 10 milliGRAM(s) IV Push every 6 hours PRN  influenza   Vaccine 0.5 milliLiter(s) IntraMuscular once  insulin glargine Injectable (LANTUS) 15 Unit(s) SubCutaneous at bedtime  insulin lispro (HumaLOG) corrective regimen sliding scale   SubCutaneous every 6 hours  labetalol Injectable 10 milliGRAM(s) IV Push every 6 hours PRN  niMODipine 60 milliGRAM(s) Oral every 4 hours  senna 2 Tablet(s) Oral at bedtime    IVF:  sodium chloride 2 Gram(s) Oral every 8 hours  sodium chloride 0.9%. 1000 milliLiter(s) IV Continuous <Continuous>    RADIOLOGY & ADDITIONAL TESTS:  Transcranial doppler tomorrow   Repeat angio next week     ASSESSMENT:  68y Male s/p cath angio POD 3 with SAH, neuro intact    SUBARACHNOID HEMMORAGE  No h/o HF  No pertinent family history in first degree relatives  No pertinent past medical history  Subarachnoid hemorrhage following injury, no loss of consciousness, initial encounter  SAH (subarachnoid hemorrhage)  Cerebral angiography  S/P appendectomy      PLAN:    NEURO:  Neuro checks q1  Fiorocet, tramadol, valproic acid for pain management  Pain management to consult  Transcranial dopper tomorrow  Repeat angio next week  Seizure prophylaxis: VPA     CARDIOVASCULAR:  Nimodipine 60 q4h    PULMONARY:  O2 sats good on RA     RENAL:  NS for hydration until taking adequate amounts PO   Quintanilla out    GI:  No BM x 3 days - continue bowel regimen    HEME:  CBC stable     ID:  Afebrile, no antibiotics needed     ENDO:  ISS + lantus at bedtime.   Send hyponatremia labs     DVT PROPHYLAXIS:  Subcue heparin, SCDs     FALL RISK:  [] Low Risk                                    [] Impulsive    DISPOSITION:  Full code  PT/OT pending HPI: 66yo male presented to [facility] after sudden onset of severe HA at 10pm followed by collapsing. PT drank beer and took aspirin for the HA and then passed out- pt states his knees gave out but he did not lose conciousness. +photophobia +nuchal rigidity. No N/V, no fever/chills. No head trauma. CTH done in [facility] showed SAH HH2 [place] 4. Pt was transferred to [facility] for further care. (06 Feb 2018 06:14)    S/Overnight events:    Headache improved from yesterday; pt still states is severe. No BM since before admission as well as significantly diminished appetite. Pt more lethargic than yesterday but continues to be arousable and oriented.     Hospital Course:  2/6 day: CT showing SAH w no mass effect. Cerebral angio showed infundibular dilatation, no aneurysm, AVM or other pathology.   2/7: Reimaging showing decreased blood. Pt continuing to complain of headache, predominantly occipital. Nicardipine d/c'ed, BP meds PRN for parameter: systolic >160. Denies fever, chills, chest pain, SOB, nausea, vomiting, LE swelling or pain.   2/8: Headache improved from yesterday; pt still states is severe. No BM since before admission as well as significantly diminished appetite. Pt more lethargic than yesterday but continues to be arousable and oriented.       Vital Signs Last 24 Hrs  T(C): 36.8 (08 Feb 2018 09:23), Max: 37.3 (07 Feb 2018 14:56)  T(F): 98.2 (08 Feb 2018 09:23), Max: 99.2 (07 Feb 2018 14:56)  HR: 72 (08 Feb 2018 11:12) (58 - 74)  BP: 152/74 (08 Feb 2018 11:12) (117/60 - 170/77)  BP(mean): 107 (08 Feb 2018 11:12) (83 - 118)  RR: 22 (08 Feb 2018 11:12) (10 - 22)  SpO2: 98% (08 Feb 2018 11:12) (94% - 99%)    I&O's Detail    07 Feb 2018 07:01  -  08 Feb 2018 07:00  --------------------------------------------------------  IN:    IV PiggyBack: 100 mL    Oral Fluid: 100 mL    sodium chloride 0.9%: 800 mL  Total IN: 1000 mL    OUT:    Voided: 1700 mL  Total OUT: 1700 mL    Total NET: -700 mL      08 Feb 2018 07:01  -  08 Feb 2018 11:36  --------------------------------------------------------  IN:    sodium chloride 0.9%: 200 mL  Total IN: 200 mL    OUT:    Voided: 375 mL  Total OUT: 375 mL    Total NET: -175 mL        I&O's Summary    07 Feb 2018 07:01  -  08 Feb 2018 07:00  --------------------------------------------------------  IN: 1000 mL / OUT: 1700 mL / NET: -700 mL    08 Feb 2018 07:01  -  08 Feb 2018 11:36  --------------------------------------------------------  IN: 200 mL / OUT: 375 mL / NET: -175 mL        PHYSICAL EXAM:  General: Male pt in NAD. Calm, cooperative. Lethargic but arousable to voice, responds verbally and follows commands.   HEENT: NC/AT. PERRL, EOM's intact. Poor dentition. MMM.   Cardio: S1 and S2 clear, RRR, no murmurs, gallops or rubs   Pulm: CTAB, no wheezes, rales or rhonchi  Abdomen: Soft, nontender, nondistended.  No masses or organomegaly. +BS   Skin: Warm and dry, no rashes or lesions. Site of femoral catheter insertion clean, no hematoma, erythema or swelling.   Neuro: AAOx3. Lethargic. CNII-XII grossly intact. Face symmetrical. Strength 5/5 UE & LE. No UE or LE drift. Sensation intact bilat.  Extremities: Distal pulses intact, DP, PT 3+ symmetric    Incision/Wound: Femoral IV catheter site clean and without hematoma, erythema, warmth, draining or swelling.    TUBES/LINES: None   [] CVC  [] A-line  [] Lumbar Drain  [] Ventriculostomy  [] Other    DIET:   [] NPO  [x] Mechanical: Regular   [] Tube feeds    LABS:                        12.9   10.6  )-----------( 294      ( 08 Feb 2018 06:42 )             38.0     02-08    134<L>  |  99  |  11  ----------------------------<  119<H>  3.7   |  25  |  0.61    Ca    8.6      08 Feb 2018 06:43  Phos  2.9     02-07  Mg     2.1     02-08        CAPILLARY BLOOD GLUCOSE      POCT Blood Glucose.: 146 mg/dL (08 Feb 2018 10:46)  POCT Blood Glucose.: 109 mg/dL (08 Feb 2018 06:29)  POCT Blood Glucose.: 152 mg/dL (07 Feb 2018 21:31)  POCT Blood Glucose.: 180 mg/dL (07 Feb 2018 16:56)      Drug Levels: [] N/A    CSF Analysis: [] N/A      Allergies    No Known Allergies    Intolerances      MEDICATIONS:  Antibiotics:    Neuro:  acetaminophen 300 mG/butalbital 50 mG/ caffeine 40 mG 1 Capsule(s) Oral every 6 hours PRN  metoclopramide Injectable 5 milliGRAM(s) IV Push every 6 hours PRN  ondansetron Injectable 4 milliGRAM(s) IV Push every 6 hours PRN  traMADol 50 milliGRAM(s) Oral every 6 hours  valproate sodium IVPB 500 milliGRAM(s) IV Intermittent every 8 hours    Anticoagulation:  heparin  Injectable 5000 Unit(s) SubCutaneous every 8 hours    OTHER:  docusate sodium 100 milliGRAM(s) Oral three times a day  hydrALAZINE Injectable 10 milliGRAM(s) IV Push every 6 hours PRN  influenza   Vaccine 0.5 milliLiter(s) IntraMuscular once  insulin glargine Injectable (LANTUS) 15 Unit(s) SubCutaneous at bedtime  insulin lispro (HumaLOG) corrective regimen sliding scale   SubCutaneous every 6 hours  labetalol Injectable 10 milliGRAM(s) IV Push every 6 hours PRN  niMODipine 60 milliGRAM(s) Oral every 4 hours  senna 2 Tablet(s) Oral at bedtime    IVF:  sodium chloride 2 Gram(s) Oral every 8 hours  sodium chloride 0.9%. 1000 milliLiter(s) IV Continuous <Continuous>    RADIOLOGY & ADDITIONAL TESTS:  Transcranial doppler tomorrow   Repeat angio next week     ASSESSMENT:  68y Male s/p cerebral angio POD#2, with SAH PBD#3, neuro intact    SUBARACHNOID HEMMORAGE  No h/o HF  No pertinent family history in first degree relatives  No pertinent past medical history  Subarachnoid hemorrhage following injury, no loss of consciousness, initial encounter  SAH (subarachnoid hemorrhage)  Cerebral angiography  S/P appendectomy    PLAN:    NEURO:  Neuro checks q1  Fioricet, tramadol, valproic acid for pain management  Pain management to consult  Transcranial dopper tomorrow  Repeat cerebral angio next week  Seizure prophylaxis: discontinue keppra    CARDIOVASCULAR:  Nimodipine 60 q4h  -150     PULMONARY:  O2 sats good on RA     RENAL:  NS for hydration until taking adequate amounts PO   Quintanilla out  Start salts tabs 2mg q8h, f/u BMP    GI:  No BM x 3 days - continue bowel regimen    HEME:  CBC stable     ID:  Afebrile, no antibiotics needed     ENDO:  ISS + lantus at bedtime.   Send hyponatremia labs, f/u osms    DVT PROPHYLAXIS:  SQH, SCDs     DISPOSITION:  Full code  PT/OT pending     -D/w Dr. Slaughter, Dr. Moreno and SICU team

## 2018-02-08 NOTE — PROGRESS NOTE ADULT - SUBJECTIVE AND OBJECTIVE BOX
=================================  NEUROCRITICAL CARE ATTENDING NOTE  =================================    BARBARA LOMBARDO   MRN-[id number]  Summary:  68y/M presented to [facility] after sudden onset of severe HA at 10pm followed by collapsing. PT drank beer and took aspirin for the HA and then passed out- pt states his knees gave out but he did not lose conciousness. +photophobia +nuchal rigidity. No N/V, no fever/chills. No head trauma. CTH done in [facility] showed SAH HH2 [place] 4. Pt was transferred to [facility] for further care. (2018 06:14)  Overnight Events: Still with severe headaches    PAST MEDICAL & SURGICAL HISTORY: NEG  NKDA  Home meds: none    PHYSICAL EXAMINATION  T(C): 36.8 ( @ 09:23), Max: 37.3 ( @ 14:56) HR: 62 ( @ 10:00) (58 - 74) BP: 151/68 ( @ 10:00) (117/60 - 170/77) RR: 11 ( @ 10:00) (10 - 27) SpO2: 97% ( @ 10:00) (94% - 99%)  NEUROLOGIC EXAMINATION:  Patient is awake, alert, oriented x2, , pupils 3mm equal and briskly reactive to light, EOMs intact, muscle strength 5/5 on all 4 extremities  GENERAL:  not intubated, not in cardiorespiratory distress  EENT: anicteric  CARDIOVASC:  (+) S1 S2, normal rate and regular rhythm  PULMONARY:  clear to auscultation bilaterally  ABDOMEN:  soft, nontender, with normoactive bowel sounds  EXTREMITIES:  no edema  SKIN:  no rash    LABS:  CAPILLARY BLOOD GLUCOSE 146 109 152 180 204             (11.7)   12.9   10.6  )-----------( 294      ( 2018 06:42 )             38.0     (134)  134<L>  |  99  |  11  ----------------------------<  119<H>  3.7   |  25  |  0.61    Ca    8.6      2018 06:43  Phos  2.9       Mg     2.1      @ 07:01  -   @ 07:00  IN: 1000 mL / OUT: 1700 mL / NET: -700 mL     @ 07: @ 10:51  --------------------------------------------------------  IN: 50 mL / OUT: 0 mL / NET: 50 mL    Bacteriology:  CSF studies:  EEG:  Neuroimaging:  Other imagin/06 Echo EF 60-65%    MEDICATIONS: docusate SQH q8h lantus 15 HS mod ISS levetiracetam 500 PO BID nimodipine 60mg q4h senna HS tramadol 50mg q6h valproate 500 IV q8h   SQH    IV FLUIDS: NS@50cc/hr  DRIPS:  DIET: regular  Lines:  Drains:    Wounds:    CODE STATUS:  full code                       GOALS OF CARE:  aggressive                      DISPOSITION:  ICU =================================  NEUROCRITICAL CARE ATTENDING NOTE  =================================    BARBARA LOMBARDO   MRN-[id number]  Summary:  68y/M with no PMH, presented with sudden onset of severe HA.  He took ASA and subsequently passed out (?).  Brought to [facility], where CT showed SAH MF4, HH2.  He was transferred to [facility] for further care. (2018 06:14)    Overnight Events: Still with severe headaches    PAST MEDICAL & SURGICAL HISTORY: NEG  NKDA  Home meds: none    PHYSICAL EXAMINATION  T(C): 36.8 ( @ 09:23), Max: 37.3 ( @ 14:56) HR: 62 ( @ 10:00) (58 - 74) BP: 151/68 ( @ 10:00) (117/60 - 170/77) RR: 11 ( @ 10:00) (10 - 27) SpO2: 97% ( @ 10:00) (94% - 99%)  NEUROLOGIC EXAMINATION:  Patient is awake, alert, oriented x2, , pupils 3mm equal and briskly reactive to light, EOMs intact, muscle strength 5/5 on all 4 extremities  GENERAL:  not intubated, not in cardiorespiratory distress  EENT: anicteric  CARDIOVASC:  (+) S1 S2, normal rate and regular rhythm  PULMONARY:  clear to auscultation bilaterally  ABDOMEN:  soft, nontender, with normoactive bowel sounds  EXTREMITIES:  no edema  SKIN:  no rash    LABS:  CAPILLARY BLOOD GLUCOSE 146 109 152 180 204             (11.7)   12.9   10.6  )-----------( 294      ( 2018 06:42 )             38.0     (134)  134<L>  |  99  |  11  ----------------------------<  119<H>  3.7   |  25  |  0.61    Ca    8.6      2018 06:43  Phos  2.9     02-07  Mg     2.1      @ 07:01  -   @ 07:00  IN: 1000 mL / OUT: 1700 mL / NET: -700 mL     @ 07: @ 10:51  --------------------------------------------------------  IN: 50 mL / OUT: 0 mL / NET: 50 mL    Bacteriology:  CSF studies:  EEG:  Neuroimaging:  Other imagin/06 Echo EF 60-65%    MEDICATIONS: docusate SQH q8h lantus 15 HS mod ISS levetiracetam 500 PO BID nimodipine 60mg q4h senna HS tramadol 50mg q6h valproate 500 IV q8h   SQH    IV FLUIDS: NS@50cc/hr  DRIPS:  DIET: regular  Lines:  Drains:    Wounds:    CODE STATUS:  full code                       GOALS OF CARE:  aggressive                      DISPOSITION:  ICU

## 2018-02-09 LAB
ANION GAP SERPL CALC-SCNC: 15 MMOL/L — SIGNIFICANT CHANGE UP (ref 5–17)
ANION GAP SERPL CALC-SCNC: 15 MMOL/L — SIGNIFICANT CHANGE UP (ref 5–17)
BUN SERPL-MCNC: 12 MG/DL — SIGNIFICANT CHANGE UP (ref 7–23)
BUN SERPL-MCNC: 8 MG/DL — SIGNIFICANT CHANGE UP (ref 7–23)
CALCIUM SERPL-MCNC: 8.5 MG/DL — SIGNIFICANT CHANGE UP (ref 8.4–10.5)
CALCIUM SERPL-MCNC: 8.9 MG/DL — SIGNIFICANT CHANGE UP (ref 8.4–10.5)
CHLORIDE SERPL-SCNC: 101 MMOL/L — SIGNIFICANT CHANGE UP (ref 96–108)
CHLORIDE SERPL-SCNC: 99 MMOL/L — SIGNIFICANT CHANGE UP (ref 96–108)
CO2 SERPL-SCNC: 23 MMOL/L — SIGNIFICANT CHANGE UP (ref 22–31)
CO2 SERPL-SCNC: 23 MMOL/L — SIGNIFICANT CHANGE UP (ref 22–31)
CREAT SERPL-MCNC: 0.5 MG/DL — SIGNIFICANT CHANGE UP (ref 0.5–1.3)
CREAT SERPL-MCNC: 0.61 MG/DL — SIGNIFICANT CHANGE UP (ref 0.5–1.3)
GLUCOSE SERPL-MCNC: 127 MG/DL — HIGH (ref 70–99)
GLUCOSE SERPL-MCNC: 82 MG/DL — SIGNIFICANT CHANGE UP (ref 70–99)
HCT VFR BLD CALC: 38.5 % — LOW (ref 39–50)
HGB BLD-MCNC: 13.3 G/DL — SIGNIFICANT CHANGE UP (ref 13–17)
MAGNESIUM SERPL-MCNC: 2.2 MG/DL — SIGNIFICANT CHANGE UP (ref 1.6–2.6)
MAGNESIUM SERPL-MCNC: 2.3 MG/DL — SIGNIFICANT CHANGE UP (ref 1.6–2.6)
MCHC RBC-ENTMCNC: 31.6 PG — SIGNIFICANT CHANGE UP (ref 27–34)
MCHC RBC-ENTMCNC: 34.5 G/DL — SIGNIFICANT CHANGE UP (ref 32–36)
MCV RBC AUTO: 91.4 FL — SIGNIFICANT CHANGE UP (ref 80–100)
PHOSPHATE SERPL-MCNC: 2.8 MG/DL — SIGNIFICANT CHANGE UP (ref 2.5–4.5)
PLATELET # BLD AUTO: 299 K/UL — SIGNIFICANT CHANGE UP (ref 150–400)
POTASSIUM SERPL-MCNC: 3.4 MMOL/L — LOW (ref 3.5–5.3)
POTASSIUM SERPL-MCNC: 3.8 MMOL/L — SIGNIFICANT CHANGE UP (ref 3.5–5.3)
POTASSIUM SERPL-SCNC: 3.4 MMOL/L — LOW (ref 3.5–5.3)
POTASSIUM SERPL-SCNC: 3.8 MMOL/L — SIGNIFICANT CHANGE UP (ref 3.5–5.3)
RBC # BLD: 4.21 M/UL — SIGNIFICANT CHANGE UP (ref 4.2–5.8)
RBC # FLD: 12.4 % — SIGNIFICANT CHANGE UP (ref 10.3–16.9)
SODIUM SERPL-SCNC: 137 MMOL/L — SIGNIFICANT CHANGE UP (ref 135–145)
SODIUM SERPL-SCNC: 139 MMOL/L — SIGNIFICANT CHANGE UP (ref 135–145)
WBC # BLD: 11.4 K/UL — HIGH (ref 3.8–10.5)
WBC # FLD AUTO: 11.4 K/UL — HIGH (ref 3.8–10.5)

## 2018-02-09 PROCEDURE — 99291 CRITICAL CARE FIRST HOUR: CPT

## 2018-02-09 RX ORDER — DEXAMETHASONE 0.5 MG/5ML
4 ELIXIR ORAL EVERY 6 HOURS
Qty: 0 | Refills: 0 | Status: DISCONTINUED | OUTPATIENT
Start: 2018-02-09 | End: 2018-02-11

## 2018-02-09 RX ORDER — OXYCODONE HYDROCHLORIDE 5 MG/1
10 TABLET ORAL EVERY 4 HOURS
Qty: 0 | Refills: 0 | Status: DISCONTINUED | OUTPATIENT
Start: 2018-02-09 | End: 2018-02-15

## 2018-02-09 RX ORDER — SODIUM,POTASSIUM PHOSPHATES 278-250MG
2 POWDER IN PACKET (EA) ORAL EVERY 4 HOURS
Qty: 0 | Refills: 0 | Status: COMPLETED | OUTPATIENT
Start: 2018-02-09 | End: 2018-02-09

## 2018-02-09 RX ORDER — MAGNESIUM HYDROXIDE 400 MG/1
30 TABLET, CHEWABLE ORAL DAILY
Qty: 0 | Refills: 0 | Status: DISCONTINUED | OUTPATIENT
Start: 2018-02-09 | End: 2018-02-15

## 2018-02-09 RX ORDER — ACETAMINOPHEN 500 MG
1000 TABLET ORAL ONCE
Qty: 0 | Refills: 0 | Status: COMPLETED | OUTPATIENT
Start: 2018-02-09 | End: 2018-02-09

## 2018-02-09 RX ORDER — CYCLOBENZAPRINE HYDROCHLORIDE 10 MG/1
5 TABLET, FILM COATED ORAL THREE TIMES A DAY
Qty: 0 | Refills: 0 | Status: DISCONTINUED | OUTPATIENT
Start: 2018-02-09 | End: 2018-02-13

## 2018-02-09 RX ORDER — PANTOPRAZOLE SODIUM 20 MG/1
40 TABLET, DELAYED RELEASE ORAL
Qty: 0 | Refills: 0 | Status: DISCONTINUED | OUTPATIENT
Start: 2018-02-09 | End: 2018-02-15

## 2018-02-09 RX ADMIN — HEPARIN SODIUM 5000 UNIT(S): 5000 INJECTION INTRAVENOUS; SUBCUTANEOUS at 07:37

## 2018-02-09 RX ADMIN — Medication 1 CAPSULE(S): at 13:14

## 2018-02-09 RX ADMIN — PANTOPRAZOLE SODIUM 40 MILLIGRAM(S): 20 TABLET, DELAYED RELEASE ORAL at 11:29

## 2018-02-09 RX ADMIN — Medication 100 MILLIGRAM(S): at 13:14

## 2018-02-09 RX ADMIN — NIMODIPINE 60 MILLIGRAM(S): 60 SOLUTION ORAL at 13:13

## 2018-02-09 RX ADMIN — Medication 10 MILLIGRAM(S): at 20:22

## 2018-02-09 RX ADMIN — OXYCODONE HYDROCHLORIDE 10 MILLIGRAM(S): 5 TABLET ORAL at 17:11

## 2018-02-09 RX ADMIN — Medication 2 TABLET(S): at 17:11

## 2018-02-09 RX ADMIN — Medication 100 MILLIGRAM(S): at 07:37

## 2018-02-09 RX ADMIN — Medication 100 MILLIGRAM(S): at 22:16

## 2018-02-09 RX ADMIN — Medication 400 MILLIGRAM(S): at 00:15

## 2018-02-09 RX ADMIN — Medication 4 MILLIGRAM(S): at 17:11

## 2018-02-09 RX ADMIN — CYCLOBENZAPRINE HYDROCHLORIDE 5 MILLIGRAM(S): 10 TABLET, FILM COATED ORAL at 22:16

## 2018-02-09 RX ADMIN — Medication 4 MILLIGRAM(S): at 11:30

## 2018-02-09 RX ADMIN — Medication 10 MILLIGRAM(S): at 22:26

## 2018-02-09 RX ADMIN — OXYCODONE HYDROCHLORIDE 10 MILLIGRAM(S): 5 TABLET ORAL at 11:30

## 2018-02-09 RX ADMIN — SODIUM CHLORIDE 75 MILLILITER(S): 9 INJECTION INTRAMUSCULAR; INTRAVENOUS; SUBCUTANEOUS at 04:48

## 2018-02-09 RX ADMIN — Medication 1000 MILLIGRAM(S): at 00:30

## 2018-02-09 RX ADMIN — NIMODIPINE 60 MILLIGRAM(S): 60 SOLUTION ORAL at 07:40

## 2018-02-09 RX ADMIN — OXYCODONE HYDROCHLORIDE 10 MILLIGRAM(S): 5 TABLET ORAL at 12:05

## 2018-02-09 RX ADMIN — Medication 1000 MILLIGRAM(S): at 08:31

## 2018-02-09 RX ADMIN — Medication 400 MILLIGRAM(S): at 07:52

## 2018-02-09 RX ADMIN — SODIUM CHLORIDE 2 GRAM(S): 9 INJECTION INTRAMUSCULAR; INTRAVENOUS; SUBCUTANEOUS at 22:17

## 2018-02-09 RX ADMIN — SODIUM CHLORIDE 2 GRAM(S): 9 INJECTION INTRAMUSCULAR; INTRAVENOUS; SUBCUTANEOUS at 07:38

## 2018-02-09 RX ADMIN — Medication 1 CAPSULE(S): at 21:30

## 2018-02-09 RX ADMIN — SODIUM CHLORIDE 2 GRAM(S): 9 INJECTION INTRAMUSCULAR; INTRAVENOUS; SUBCUTANEOUS at 13:14

## 2018-02-09 RX ADMIN — SENNA PLUS 2 TABLET(S): 8.6 TABLET ORAL at 22:16

## 2018-02-09 RX ADMIN — NIMODIPINE 60 MILLIGRAM(S): 60 SOLUTION ORAL at 17:13

## 2018-02-09 RX ADMIN — Medication 2 TABLET(S): at 13:13

## 2018-02-09 RX ADMIN — Medication 1 CAPSULE(S): at 14:30

## 2018-02-09 RX ADMIN — NIMODIPINE 60 MILLIGRAM(S): 60 SOLUTION ORAL at 04:51

## 2018-02-09 RX ADMIN — NIMODIPINE 60 MILLIGRAM(S): 60 SOLUTION ORAL at 22:17

## 2018-02-09 RX ADMIN — HEPARIN SODIUM 5000 UNIT(S): 5000 INJECTION INTRAVENOUS; SUBCUTANEOUS at 13:14

## 2018-02-09 RX ADMIN — Medication 1 CAPSULE(S): at 20:31

## 2018-02-09 RX ADMIN — Medication 2 TABLET(S): at 11:30

## 2018-02-09 RX ADMIN — NIMODIPINE 60 MILLIGRAM(S): 60 SOLUTION ORAL at 09:35

## 2018-02-09 RX ADMIN — HEPARIN SODIUM 5000 UNIT(S): 5000 INJECTION INTRAVENOUS; SUBCUTANEOUS at 22:16

## 2018-02-09 RX ADMIN — OXYCODONE HYDROCHLORIDE 10 MILLIGRAM(S): 5 TABLET ORAL at 18:00

## 2018-02-09 NOTE — PROGRESS NOTE ADULT - SUBJECTIVE AND OBJECTIVE BOX
HPI: 66yo male presented to [facility] after sudden onset of severe HA at 10pm followed by collapsing. PT drank beer and took aspirin for the HA and then passed out- pt states his knees gave out but he did not lose conciousness. +photophobia +nuchal rigidity. No N/V, no fever/chills. No head trauma. CTH done in [facility] showed SAH HH2 [place] 4. Pt was transferred to [facility] for further care. (2018 06:14)    S/Overnight events:  No overnight events       Hospital Course:   POD#       Vital Signs Last 24 Hrs  T(C): 36.4 (2018 09:19), Max: 36.6 (2018 01:58)  T(F): 97.6 (2018 09:19), Max: 97.9 (2018 06:30)  HR: 66 (2018 09:00) (56 - 72)  BP: 168/69 (2018 09:00) (110/50 - 169/79)  BP(mean): 116 (2018 09:00) (68 - 130)  RR: 12 (2018 09:00) (10 - 36)  SpO2: 96% (2018 09:00) (95% - 99%)    I&O's Detail    2018 07:01  -  2018 07:00  --------------------------------------------------------  IN:    IV PiggyBack: 250 mL    Oral Fluid: 180 mL    sodium chloride 0.9%: 200 mL    sodium chloride 0.9%.: 1500 mL  Total IN: 2130 mL    OUT:    Voided: 2125 mL  Total OUT: 2125 mL    Total NET: 5 mL      2018 07:01  -  2018 10:10  --------------------------------------------------------  IN:    sodium chloride 0.9%.: 150 mL  Total IN: 150 mL    OUT:  Total OUT: 0 mL    Total NET: 150 mL        I&O's Summary    2018 07:01  -  2018 07:00  --------------------------------------------------------  IN: 2130 mL / OUT: 2125 mL / NET: 5 mL    2018 07:01  -  2018 10:10  --------------------------------------------------------  IN: 150 mL / OUT: 0 mL / NET: 150 mL        PHYSICAL EXAM:  Neurological:    Motor exam:         [] Upper extremity              Bi(c5)  WE(c6)  EE(c7)   FF(c8)                                                R         5/5        5/5        5/5       5/5                                               L          5/5        5/5        5/5       5/5         [] Lower extremeity          HF(l2)   KE(l3)    TA(l4)   EHL(l5)  GS(s1)                                                 R        5/5        5/5        5/5       5/5         5/5                                               L         5/5        5/5       5/5       5/5          5/5                                                        [] warm well perfused; capillary refill <3 seconds     Sensation: [] intact to light touch  [] decreased:       Cardiovascular:  Respiratory:  Gastrointestinal:  Genitourinary:  Extremities:    Incision/Wound:    DEVICE/DRAIN DRESSING:    TUBES/LINES:  [] CVC  [] A-line  [] Lumbar Drain  [] Ventriculostomy  [] Other    DIET:  [] NPO  [] Mechanical  [] Tube feeds    LABS:                        13.3   11.4  )-----------( 299      ( 2018 05:45 )             38.5     02-09    139  |  101  |  12  ----------------------------<  82  3.4<L>   |  23  |  0.61    Ca    8.9      2018 05:46  Phos  2.8     02-09  Mg     2.3     02-09        Urinalysis Basic - ( 2018 19:54 )    Color: Yellow / Appearance: Clear / S.010 / pH: x  Gluc: x / Ketone: 40 mg/dL  / Bili: Negative / Urobili: 0.2 E.U./dL   Blood: x / Protein: NEGATIVE mg/dL / Nitrite: NEGATIVE   Leuk Esterase: NEGATIVE / RBC: x / WBC x   Sq Epi: x / Non Sq Epi: x / Bacteria: x          CAPILLARY BLOOD GLUCOSE      POCT Blood Glucose.: 122 mg/dL (2018 09:40)  POCT Blood Glucose.: 69 mg/dL (2018 07:45)  POCT Blood Glucose.: 68 mg/dL (2018 07:43)  POCT Blood Glucose.: 136 mg/dL (2018 21:57)  POCT Blood Glucose.: 127 mg/dL (2018 17:45)  POCT Blood Glucose.: 146 mg/dL (2018 10:46)      Drug Levels: [] N/A    CSF Analysis: [] N/A      Allergies    No Known Allergies    Intolerances      MEDICATIONS:  Antibiotics:    Neuro:  acetaminophen 300 mG/butalbital 50 mG/ caffeine 40 mG 1 Capsule(s) Oral every 6 hours PRN  metoclopramide Injectable 5 milliGRAM(s) IV Push every 6 hours PRN  ondansetron Injectable 4 milliGRAM(s) IV Push every 6 hours PRN  oxyCODONE    IR 10 milliGRAM(s) Oral every 4 hours PRN    Anticoagulation:  heparin  Injectable 5000 Unit(s) SubCutaneous every 8 hours    OTHER:  dexamethasone     Tablet 4 milliGRAM(s) Oral every 6 hours  docusate sodium 100 milliGRAM(s) Oral three times a day  hydrALAZINE Injectable 10 milliGRAM(s) IV Push every 6 hours PRN  influenza   Vaccine 0.5 milliLiter(s) IntraMuscular once  insulin lispro (HumaLOG) corrective regimen sliding scale   SubCutaneous every 6 hours  labetalol Injectable 10 milliGRAM(s) IV Push every 6 hours PRN  niMODipine 60 milliGRAM(s) Oral every 4 hours  pantoprazole    Tablet 40 milliGRAM(s) Oral before breakfast  senna 2 Tablet(s) Oral at bedtime    IVF:  potassium acid phosphate/sodium acid phosphate tablet (K-PHOS No. 2) 2 Tablet(s) Oral every 4 hours  sodium chloride 2 Gram(s) Oral every 8 hours  sodium chloride 0.9%. 1000 milliLiter(s) IV Continuous <Continuous>    CULTURES:    RADIOLOGY & ADDITIONAL TESTS:      ASSESSMENT:  68y Male s/p    SUBACHENOID HEMMORAGE  No h/o HF  Unknown h/o HF  No pertinent family history in first degree relatives  Handoff  No pertinent past medical history  Subarachnoid hemorrhage  Subarachnoid hemorrhage following injury, no loss of consciousness, initial encounter  SAH (subarachnoid hemorrhage)  Cerebral angiography  S/P appendectomy      PLAN:  NEURO:    CARDIOVASCULAR:    PULMONARY:    RENAL:    GI:    HEME:    ID:    ENDO:    DVT PROPHYLAXIS:  [] Venodynes                                [] Heparin/Lovenox    FALL RISK:  [] Low Risk                                    [] Impulsive    DISPOSITION:

## 2018-02-09 NOTE — PROGRESS NOTE ADULT - SUBJECTIVE AND OBJECTIVE BOX
HPI: 68yo male presented to [facility] after sudden onset of severe HA at 10pm followed by collapsing. PT drank beer and took aspirin for the HA and then passed out- pt states his knees gave out but he did not lose conciousness. +photophobia +nuchal rigidity. No N/V, no fever/chills. No head trauma. CTH done in [facility] showed SAH HH2 [place] 4. Pt was transferred to [facility] for further care. (2018 06:14)     S/Overnight events:    Pt continues to endorse severe headache despite pain control measures. Very low appetite continues, and fingersticks have been low as a result (lantus was not given last night). Still no BM during admission.     Hospital Course:    day: CT showing SAH w no mass effect. Cerebral angio showed infundibular dilatation, no aneurysm, AVM or other pathology.   : Reimaging showing decreased blood. Pt continuing to complain of headache, predominantly occipital. Nicardipine d/c'ed, BP meds PRN for parameter: systolic >160. Denies fever, chills, chest pain, SOB, nausea, vomiting, LE swelling or pain.   : Headache improved from yesterday; pt still states is severe. No BM since before admission as well as significantly diminished appetite. Pt more lethargic than yesterday but continues to be arousable and oriented.   : Pt continues to endorse severe headache despite pain control measures. Very low appetite continues, and fingersticks have been low as a result (lantus was not given last night). Still no BM since admission. Plan for TCD today.       Vital Signs Last 24 Hrs  T(C): 36.4 (2018 09:19), Max: 36.6 (2018 01:58)  T(F): 97.6 (2018 09:19), Max: 97.9 (2018 06:30)  HR: 66 (2018 09:00) (56 - 72)  BP: 168/69 (2018 09:00) (110/50 - 169/79)  BP(mean): 116 (2018 09:00) (68 - 130)  RR: 12 (2018 09:00) (10 - 36)  SpO2: 96% (2018 09:00) (95% - 99%)    I&O's Detail    2018 07:01  -  2018 07:00  --------------------------------------------------------  IN:    IV PiggyBack: 250 mL    Oral Fluid: 180 mL    sodium chloride 0.9%: 200 mL    sodium chloride 0.9%.: 1500 mL  Total IN: 2130 mL    OUT:    Voided: 2125 mL  Total OUT: 2125 mL    Total NET: 5 mL      2018 07:  -  2018 10:19  --------------------------------------------------------  IN:    sodium chloride 0.9%.: 150 mL  Total IN: 150 mL    OUT:  Total OUT: 0 mL    Total NET: 150 mL        I&O's Summary    2018 07:  -  2018 07:00  --------------------------------------------------------  IN: 2130 mL / OUT: 2125 mL / NET: 5 mL    2018 07:  -  2018 10:19  --------------------------------------------------------  IN: 150 mL / OUT: 0 mL / NET: 150 mL        PHYSICAL EXAM:  General: Male pt in NAD. Appears uncomfortable due to headache. Calm, cooperative. Lethargic but arousable to voice, responds verbally and follows commands.   HEENT: NC/AT. PERRL, EOM's intact. Poor dentition. MMM.   Cardio: S1 and S2 clear, RRR, no murmurs, gallops or rubs   Pulm: CTAB, no wheezes, rales or rhonchi  Abdomen: Soft, nontender, nondistended.  No masses or organomegaly. +BS   Skin: Warm and dry, no rashes or lesions. Site of femoral catheter insertion clean, no hematoma, erythema or swelling.   Neuro: AAOx3. Lethargic. CNII-XII grossly intact. Face symmetrical. Strength 5/5 UE & LE. No UE or LE drift. Sensation intact bilat.  Extremities: Distal pulses intact, DP, PT 3+ symmetric    Incision/Wound: Femoral IV catheter site clean and without hematoma, erythema, warmth, draining or swelling.    DEVICE/DRAIN DRESSING: N/A     TUBES/LINES: N?A   [] CVC  [] A-line  [] Lumbar Drain  [] Ventriculostomy  [] Other    DIET:  [] NPO  [x] Mechanical - regular   [] Tube feeds    LABS:                        13.3   11.4  )-----------( 299      ( 2018 05:45 )             38.5     02-    139  |  101  |  12  ----------------------------<  82  3.4<L>   |  23  |  0.61    Ca    8.9      2018 05:46  Phos  2.8     02-  Mg     2.3     -        Urinalysis Basic - ( 2018 19:54 )    Color: Yellow / Appearance: Clear / S.010 / pH: x  Gluc: x / Ketone: 40 mg/dL  / Bili: Negative / Urobili: 0.2 E.U./dL   Blood: x / Protein: NEGATIVE mg/dL / Nitrite: NEGATIVE   Leuk Esterase: NEGATIVE / RBC: x / WBC x   Sq Epi: x / Non Sq Epi: x / Bacteria: x          CAPILLARY BLOOD GLUCOSE      POCT Blood Glucose.: 122 mg/dL (2018 09:40)  POCT Blood Glucose.: 69 mg/dL (2018 07:45)  POCT Blood Glucose.: 68 mg/dL (2018 07:43)  POCT Blood Glucose.: 136 mg/dL (2018 21:57)  POCT Blood Glucose.: 127 mg/dL (2018 17:45)  POCT Blood Glucose.: 146 mg/dL (2018 10:46)      Drug Levels: [] N/A    CSF Analysis: [] N/A      Allergies  No Known Allergies    Intolerances  None reported       MEDICATIONS:  Antibiotics:    Neuro:  acetaminophen 300 mG/butalbital 50 mG/ caffeine 40 mG 1 Capsule(s) Oral every 6 hours PRN  metoclopramide Injectable 5 milliGRAM(s) IV Push every 6 hours PRN  ondansetron Injectable 4 milliGRAM(s) IV Push every 6 hours PRN  oxyCODONE    IR 10 milliGRAM(s) Oral every 4 hours PRN    Anticoagulation:  heparin  Injectable 5000 Unit(s) SubCutaneous every 8 hours    OTHER:  dexamethasone     Tablet 4 milliGRAM(s) Oral every 6 hours  docusate sodium 100 milliGRAM(s) Oral three times a day  hydrALAZINE Injectable 10 milliGRAM(s) IV Push every 6 hours PRN  influenza   Vaccine 0.5 milliLiter(s) IntraMuscular once  insulin lispro (HumaLOG) corrective regimen sliding scale   SubCutaneous every 6 hours  labetalol Injectable 10 milliGRAM(s) IV Push every 6 hours PRN  niMODipine 60 milliGRAM(s) Oral every 4 hours  pantoprazole    Tablet 40 milliGRAM(s) Oral before breakfast  senna 2 Tablet(s) Oral at bedtime    IVF:  potassium acid phosphate/sodium acid phosphate tablet (K-PHOS No. 2) 2 Tablet(s) Oral every 4 hours  sodium chloride 2 Gram(s) Oral every 8 hours  sodium chloride 0.9%. 1000 milliLiter(s) IV Continuous <Continuous>    CULTURES: N/A     RADIOLOGY & ADDITIONAL TESTS:  Transcranial doppler today   Repeat cerebral angio next week (tuesday will be 1 wk from initial)       ASSESSMENT:  68y Male s/p cerebral angio POD#3, with SAH PBD#4, neuro intact    PLAN:  NEURO:  -Neuro checks q1h  -Continue to monitor for hydrocephalus   -Pain management: Fiorocet, oxycodone   -D/c Keppra & VPA   -Start decadron  -Repeat cerebral angiogram Tues    CARDIOVASCULAR:  -Continue nimodipine  -BP goal systolic 100-160 with labetolol and hydralazine PRN for systolic >160    PULMONARY:  -O2 sats good on RA     RENAL:  -Continue IV fluids  -Continue salt tabs  -No aguirre, pt voiding    GI:  -Continue bowel regimen   -Addition of MOM if no BM today as per pain management     HEME:  -CBC stable     ID:  -Afebrile     ENDO:  -ISS + lantus at bedtime being held now due to pt not eating and subsequent low FS   -Osmolality workup (-)   -Kphos for repletion of low phos       DVT PROPHYLAXIS:  SQH, SCD's     DISPOSITION:  Full code  PT/OT seen on  and recommended 3-5 sessions weekly     -D/w Dr. Slaughter, Dr. Moreno and SICU team HPI: 66yo male presented to [facility] after sudden onset of severe HA at 10pm followed by collapsing. PT drank beer and took aspirin for the HA and then passed out- pt states his knees gave out but he did not lose conciousness. +photophobia +nuchal rigidity. No N/V, no fever/chills. No head trauma. CTH done in [facility] showed SAH HH2 [place] 4. Pt was transferred to [facility] for further care. (2018 06:14)     S/Overnight events:    Pt continues to endorse severe headache despite pain control measures. Very low appetite continues, and fingersticks have been low as a result (lantus was not given last night). Still no BM during admission.     Hospital Course:    day: CT showing SAH w no mass effect. Cerebral angio showed infundibular dilatation, no aneurysm, AVM or other pathology.   : Reimaging showing decreased blood. Pt continuing to complain of headache, predominantly occipital. Nicardipine d/c'ed, BP meds PRN for parameter: systolic >160. Denies fever, chills, chest pain, SOB, nausea, vomiting, LE swelling or pain.   : Headache improved from yesterday; pt still states is severe. No BM since before admission as well as significantly diminished appetite. Pt more lethargic than yesterday but continues to be arousable and oriented.   : Pt continues to endorse severe headache despite pain control measures. Very low appetite continues, and fingersticks have been low as a result (lantus was not given last night). Still no BM since admission. Plan for TCD today.       Vital Signs Last 24 Hrs  T(C): 36.4 (2018 09:19), Max: 36.6 (2018 01:58)  T(F): 97.6 (2018 09:19), Max: 97.9 (2018 06:30)  HR: 66 (2018 09:00) (56 - 72)  BP: 168/69 (2018 09:00) (110/50 - 169/79)  BP(mean): 116 (2018 09:00) (68 - 130)  RR: 12 (2018 09:00) (10 - 36)  SpO2: 96% (2018 09:00) (95% - 99%)    I&O's Detail    2018 07:01  -  2018 07:00  --------------------------------------------------------  IN:    IV PiggyBack: 250 mL    Oral Fluid: 180 mL    sodium chloride 0.9%: 200 mL    sodium chloride 0.9%.: 1500 mL  Total IN: 2130 mL    OUT:    Voided: 2125 mL  Total OUT: 2125 mL    Total NET: 5 mL      2018 07:  -  2018 10:19  --------------------------------------------------------  IN:    sodium chloride 0.9%.: 150 mL  Total IN: 150 mL    OUT:  Total OUT: 0 mL    Total NET: 150 mL        I&O's Summary    2018 07:  -  2018 07:00  --------------------------------------------------------  IN: 2130 mL / OUT: 2125 mL / NET: 5 mL    2018 07:  -  2018 10:19  --------------------------------------------------------  IN: 150 mL / OUT: 0 mL / NET: 150 mL        PHYSICAL EXAM:  General: Male pt in NAD. Appears uncomfortable due to headache. Calm, cooperative. Lethargic but arousable to voice, responds verbally and follows commands.   HEENT: NC/AT. PERRL, EOM's intact. Poor dentition. MMM.   Cardio: S1 and S2 clear, RRR, no murmurs, gallops or rubs   Pulm: CTAB, no wheezes, rales or rhonchi  Abdomen: Soft, nontender, nondistended.  No masses or organomegaly. +BS   Skin: Warm and dry, no rashes or lesions. Site of femoral catheter insertion clean, no hematoma, erythema or swelling.   Neuro: AAOx3. Lethargic. CNII-XII grossly intact. Face symmetrical. Strength 5/5 UE & LE. No UE or LE drift. Sensation intact bilat.  Extremities: Distal pulses intact, DP, PT 3+ symmetric    Incision/Wound: Femoral IV catheter site clean and without hematoma, erythema, warmth, draining or swelling.    DEVICE/DRAIN DRESSING: N/A     TUBES/LINES: N?A   [] CVC  [] A-line  [] Lumbar Drain  [] Ventriculostomy  [] Other    DIET:  [] NPO  [x] Mechanical - regular   [] Tube feeds    LABS:                        13.3   11.4  )-----------( 299      ( 2018 05:45 )             38.5     02-    139  |  101  |  12  ----------------------------<  82  3.4<L>   |  23  |  0.61    Ca    8.9      2018 05:46  Phos  2.8     02-  Mg     2.3     -        Urinalysis Basic - ( 2018 19:54 )    Color: Yellow / Appearance: Clear / S.010 / pH: x  Gluc: x / Ketone: 40 mg/dL  / Bili: Negative / Urobili: 0.2 E.U./dL   Blood: x / Protein: NEGATIVE mg/dL / Nitrite: NEGATIVE   Leuk Esterase: NEGATIVE / RBC: x / WBC x   Sq Epi: x / Non Sq Epi: x / Bacteria: x          CAPILLARY BLOOD GLUCOSE      POCT Blood Glucose.: 122 mg/dL (2018 09:40)  POCT Blood Glucose.: 69 mg/dL (2018 07:45)  POCT Blood Glucose.: 68 mg/dL (2018 07:43)  POCT Blood Glucose.: 136 mg/dL (2018 21:57)  POCT Blood Glucose.: 127 mg/dL (2018 17:45)  POCT Blood Glucose.: 146 mg/dL (2018 10:46)      Drug Levels: [] N/A    CSF Analysis: [] N/A      Allergies  No Known Allergies    Intolerances  None reported       MEDICATIONS:  Antibiotics:    Neuro:  acetaminophen 300 mG/butalbital 50 mG/ caffeine 40 mG 1 Capsule(s) Oral every 6 hours PRN  metoclopramide Injectable 5 milliGRAM(s) IV Push every 6 hours PRN  ondansetron Injectable 4 milliGRAM(s) IV Push every 6 hours PRN  oxyCODONE    IR 10 milliGRAM(s) Oral every 4 hours PRN    Anticoagulation:  heparin  Injectable 5000 Unit(s) SubCutaneous every 8 hours    OTHER:  dexamethasone     Tablet 4 milliGRAM(s) Oral every 6 hours  docusate sodium 100 milliGRAM(s) Oral three times a day  hydrALAZINE Injectable 10 milliGRAM(s) IV Push every 6 hours PRN  influenza   Vaccine 0.5 milliLiter(s) IntraMuscular once  insulin lispro (HumaLOG) corrective regimen sliding scale   SubCutaneous every 6 hours  labetalol Injectable 10 milliGRAM(s) IV Push every 6 hours PRN  niMODipine 60 milliGRAM(s) Oral every 4 hours  pantoprazole    Tablet 40 milliGRAM(s) Oral before breakfast  senna 2 Tablet(s) Oral at bedtime    IVF:  potassium acid phosphate/sodium acid phosphate tablet (K-PHOS No. 2) 2 Tablet(s) Oral every 4 hours  sodium chloride 2 Gram(s) Oral every 8 hours  sodium chloride 0.9%. 1000 milliLiter(s) IV Continuous <Continuous>    CULTURES: N/A     RADIOLOGY & ADDITIONAL TESTS:  Transcranial doppler today   Repeat cerebral angio next week (tuesday will be 1 wk from initial)       ASSESSMENT:  68y Male s/p cerebral angio POD#3, with SAH PBD#4, neuro intact    PLAN:  NEURO:  -Neuro checks q1h  -Continue to monitor for hydrocephalus   -Pain management: Fiorocet, oxycodone   -D/c Keppra & VPA   -Start decadron  -Repeat cerebral angiogram Tues  -TCDs today    CARDIOVASCULAR:  -Continue nimodipine  -BP goal systolic 100-160 with labetolol and hydralazine PRN for systolic >160    PULMONARY:  -O2 sats good on RA     RENAL:  -Continue IV fluids  -Continue salt tabs  -No aguirre, pt voiding    GI:  -Continue bowel regimen   -Addition of MOM if no BM today as per pain management     HEME:  -CBC stable     ID:  -Afebrile     ENDO:  -ISS + lantus at bedtime being held now due to pt not eating and subsequent low FS   -Osmolality workup (-)   -Kphos for repletion of low phos       DVT PROPHYLAXIS:  -SQH, SCD's     DISPOSITION:  Full code  PT/OT seen on  and recommended 3-5 sessions weekly     -D/w Dr. Slaughter, Dr. Moreno and SICU team

## 2018-02-09 NOTE — PROGRESS NOTE ADULT - ASSESSMENT
68M with   1.  subarachnoid hemorrhage, s/p angio (02/06/18, Dr. Rodriguez); R Pcomm infundibular dilatation  new Diabetes Mellitus    PLAN: Day 1 02/06  Day 3 02/08  Day 7 02/12  Day 14 02/19   Day 21  02/26  NEURO: neurochecks q1h, PRN pain meds with Tylenol / fioricet  SAH:  TCD today; conventional angio on Tue, cont nimodipine 60 mg po q4h to be given for 21 days (last day 02/26)  Hydrocephalus watch, no EVD currently  Seizure prophylaxis continue VPA  REHAB:  physical therapy evaluation and management    EARLY MOB:  HOB elevated, OOB to chair    PULM:  Room air, incentive spirometry  CARDIO:  SBP goal 100-160mm Hg  ENDO:  Blood sugar goals 140-180 mg/dL, cont insulin sliding scale, off lantus due to hypoglycemic episode; A1C 8.5 lipid profile HDL 32  Total 172   GI:  docusate senna  DIET: CCD  RENAL: maintain euvolemia, accurate Is and Os, cont IVF; cont salt tabs 2g q8h  HEM/ONC: no coagulopathy (INR=1.11)  VTE Prophylaxis: SCDs, SQH  ID: afebrile, no leukocytosis  Social: will update family    Patient at high risk for neurological deterioration or death due to:    Critical care time, excluding procedures: 45 minutes.

## 2018-02-09 NOTE — PROGRESS NOTE ADULT - SUBJECTIVE AND OBJECTIVE BOX
Temporal TCDs were done:    Left M1 velocity 78  Left MIGUEL velocity 52  Right MCA 53  R MIGUEL 40    Performed with Neuro intensivist Dr. Dan

## 2018-02-09 NOTE — PROGRESS NOTE ADULT - SUBJECTIVE AND OBJECTIVE BOX
=================================  NEUROCRITICAL CARE ATTENDING NOTE  =================================    BARBARA LOMBARDO   MRN-[id number]  Summary:  68y/M with no PMH, presented with sudden onset of severe HA.  He took ASA and subsequently passed out (?).  Brought to [facility], where CT showed SAH MF4, HH2.  He was transferred to [facility] for further care. (2018 06:14)    Overnight Events: Still with severe headaches    PAST MEDICAL & SURGICAL HISTORY: NEG  NKDA  Home meds: none    PHYSICAL EXAMINATION  T(C): 36.4 ( @ 09:19), Max: 36.6 ( @ 01:58) HR: 66 ( @ 09:00) (56 - 72) BP: 168/69 ( @ 09:00) (110/50 - 169/79) RR: 12 ( @ 09:00) (10 - 20) SpO2: 96% ( @ 09:00) (95% - 99%)  NEUROLOGIC EXAMINATION:  Patient is awake, alert, oriented x2, , pupils 3mm equal and briskly reactive to light, EOMs intact, muscle strength 5/5 on all 4 extremities  GENERAL:  not intubated, not in cardiorespiratory distress  EENT: anicteric  CARDIOVASC:  (+) S1 S2, normal rate and regular rhythm  PULMONARY:  clear to auscultation bilaterally  ABDOMEN:  soft, nontender, with normoactive bowel sounds  EXTREMITIES:  no edema  SKIN:  no rash    LABS:  CAPILLARY BLOOD GLUCOSE 69 68 136 127 146                        13.3   11.4  )-----------( 299      ( 2018 05:45 )             38.5     139  |  101  |  12  ----------------------------<  82  3.4<L>   |  23  |  0.61    Ca    8.9      2018 05:46  Phos  2.8       Mg     2.3      @ 07: @ 07:00  IN: 2130 mL / OUT: 2125 mL / NET: 5 mL    Bacteriology:  CSF studies:  EEG:  Neuroimaging:  Other imagin/06 Echo EF 60-65%    MEDICATIONS: dexamethasone 4mg PO q6h docusate 100 TID SQH q8h mod ISS nimodipine 60mg q4h pantoprazole 40 daily senna HS salt 2 q8h fioricet PRN     IV FLUIDS: NS@75cc/hr  DRIPS:  DIET: regular  Lines:  Drains:    Wounds:    CODE STATUS:  full code                       GOALS OF CARE:  aggressive                      DISPOSITION:  ICU

## 2018-02-10 LAB
ANION GAP SERPL CALC-SCNC: 14 MMOL/L — SIGNIFICANT CHANGE UP (ref 5–17)
ANION GAP SERPL CALC-SCNC: 18 MMOL/L — HIGH (ref 5–17)
BUN SERPL-MCNC: 17 MG/DL — SIGNIFICANT CHANGE UP (ref 7–23)
BUN SERPL-MCNC: 20 MG/DL — SIGNIFICANT CHANGE UP (ref 7–23)
CALCIUM SERPL-MCNC: 8.5 MG/DL — SIGNIFICANT CHANGE UP (ref 8.4–10.5)
CALCIUM SERPL-MCNC: 9 MG/DL — SIGNIFICANT CHANGE UP (ref 8.4–10.5)
CHLORIDE SERPL-SCNC: 97 MMOL/L — SIGNIFICANT CHANGE UP (ref 96–108)
CHLORIDE SERPL-SCNC: 99 MMOL/L — SIGNIFICANT CHANGE UP (ref 96–108)
CO2 SERPL-SCNC: 20 MMOL/L — LOW (ref 22–31)
CO2 SERPL-SCNC: 21 MMOL/L — LOW (ref 22–31)
CREAT SERPL-MCNC: 0.63 MG/DL — SIGNIFICANT CHANGE UP (ref 0.5–1.3)
CREAT SERPL-MCNC: 0.67 MG/DL — SIGNIFICANT CHANGE UP (ref 0.5–1.3)
GLUCOSE SERPL-MCNC: 177 MG/DL — HIGH (ref 70–99)
GLUCOSE SERPL-MCNC: 265 MG/DL — HIGH (ref 70–99)
HCT VFR BLD CALC: 39.2 % — SIGNIFICANT CHANGE UP (ref 39–50)
HGB BLD-MCNC: 13.4 G/DL — SIGNIFICANT CHANGE UP (ref 13–17)
MAGNESIUM SERPL-MCNC: 2 MG/DL — SIGNIFICANT CHANGE UP (ref 1.6–2.6)
MCHC RBC-ENTMCNC: 31.3 PG — SIGNIFICANT CHANGE UP (ref 27–34)
MCHC RBC-ENTMCNC: 34.2 G/DL — SIGNIFICANT CHANGE UP (ref 32–36)
MCV RBC AUTO: 91.6 FL — SIGNIFICANT CHANGE UP (ref 80–100)
PHOSPHATE SERPL-MCNC: 4 MG/DL — SIGNIFICANT CHANGE UP (ref 2.5–4.5)
PLATELET # BLD AUTO: 314 K/UL — SIGNIFICANT CHANGE UP (ref 150–400)
POTASSIUM SERPL-MCNC: 3.8 MMOL/L — SIGNIFICANT CHANGE UP (ref 3.5–5.3)
POTASSIUM SERPL-MCNC: 4.2 MMOL/L — SIGNIFICANT CHANGE UP (ref 3.5–5.3)
POTASSIUM SERPL-SCNC: 3.8 MMOL/L — SIGNIFICANT CHANGE UP (ref 3.5–5.3)
POTASSIUM SERPL-SCNC: 4.2 MMOL/L — SIGNIFICANT CHANGE UP (ref 3.5–5.3)
RBC # BLD: 4.28 M/UL — SIGNIFICANT CHANGE UP (ref 4.2–5.8)
RBC # FLD: 12.4 % — SIGNIFICANT CHANGE UP (ref 10.3–16.9)
SODIUM SERPL-SCNC: 134 MMOL/L — LOW (ref 135–145)
SODIUM SERPL-SCNC: 135 MMOL/L — SIGNIFICANT CHANGE UP (ref 135–145)
WBC # BLD: 11.1 K/UL — HIGH (ref 3.8–10.5)
WBC # FLD AUTO: 11.1 K/UL — HIGH (ref 3.8–10.5)

## 2018-02-10 PROCEDURE — 99291 CRITICAL CARE FIRST HOUR: CPT

## 2018-02-10 RX ORDER — OXYCODONE HYDROCHLORIDE 5 MG/1
5 TABLET ORAL EVERY 4 HOURS
Qty: 0 | Refills: 0 | Status: DISCONTINUED | OUTPATIENT
Start: 2018-02-10 | End: 2018-02-15

## 2018-02-10 RX ORDER — SODIUM CHLORIDE 9 MG/ML
2 INJECTION INTRAMUSCULAR; INTRAVENOUS; SUBCUTANEOUS EVERY 6 HOURS
Qty: 0 | Refills: 0 | Status: DISCONTINUED | OUTPATIENT
Start: 2018-02-10 | End: 2018-02-15

## 2018-02-10 RX ORDER — ATORVASTATIN CALCIUM 80 MG/1
40 TABLET, FILM COATED ORAL AT BEDTIME
Qty: 0 | Refills: 0 | Status: DISCONTINUED | OUTPATIENT
Start: 2018-02-10 | End: 2018-02-15

## 2018-02-10 RX ORDER — LISINOPRIL 2.5 MG/1
10 TABLET ORAL DAILY
Qty: 0 | Refills: 0 | Status: DISCONTINUED | OUTPATIENT
Start: 2018-02-10 | End: 2018-02-15

## 2018-02-10 RX ADMIN — PANTOPRAZOLE SODIUM 40 MILLIGRAM(S): 20 TABLET, DELAYED RELEASE ORAL at 07:39

## 2018-02-10 RX ADMIN — NIMODIPINE 60 MILLIGRAM(S): 60 SOLUTION ORAL at 18:04

## 2018-02-10 RX ADMIN — Medication 4 MILLIGRAM(S): at 18:04

## 2018-02-10 RX ADMIN — ATORVASTATIN CALCIUM 40 MILLIGRAM(S): 80 TABLET, FILM COATED ORAL at 21:37

## 2018-02-10 RX ADMIN — SODIUM CHLORIDE 2 GRAM(S): 9 INJECTION INTRAMUSCULAR; INTRAVENOUS; SUBCUTANEOUS at 18:04

## 2018-02-10 RX ADMIN — SODIUM CHLORIDE 2 GRAM(S): 9 INJECTION INTRAMUSCULAR; INTRAVENOUS; SUBCUTANEOUS at 11:28

## 2018-02-10 RX ADMIN — NIMODIPINE 60 MILLIGRAM(S): 60 SOLUTION ORAL at 21:37

## 2018-02-10 RX ADMIN — Medication 8: at 11:28

## 2018-02-10 RX ADMIN — HEPARIN SODIUM 5000 UNIT(S): 5000 INJECTION INTRAVENOUS; SUBCUTANEOUS at 21:37

## 2018-02-10 RX ADMIN — Medication 4 MILLIGRAM(S): at 11:27

## 2018-02-10 RX ADMIN — Medication 2: at 00:53

## 2018-02-10 RX ADMIN — Medication 10 MILLIGRAM(S): at 05:47

## 2018-02-10 RX ADMIN — OXYCODONE HYDROCHLORIDE 10 MILLIGRAM(S): 5 TABLET ORAL at 05:41

## 2018-02-10 RX ADMIN — CYCLOBENZAPRINE HYDROCHLORIDE 5 MILLIGRAM(S): 10 TABLET, FILM COATED ORAL at 05:41

## 2018-02-10 RX ADMIN — NIMODIPINE 60 MILLIGRAM(S): 60 SOLUTION ORAL at 14:04

## 2018-02-10 RX ADMIN — Medication 100 MILLIGRAM(S): at 14:03

## 2018-02-10 RX ADMIN — CYCLOBENZAPRINE HYDROCHLORIDE 5 MILLIGRAM(S): 10 TABLET, FILM COATED ORAL at 14:03

## 2018-02-10 RX ADMIN — OXYCODONE HYDROCHLORIDE 10 MILLIGRAM(S): 5 TABLET ORAL at 14:08

## 2018-02-10 RX ADMIN — Medication 2: at 05:57

## 2018-02-10 RX ADMIN — NIMODIPINE 60 MILLIGRAM(S): 60 SOLUTION ORAL at 09:30

## 2018-02-10 RX ADMIN — NIMODIPINE 60 MILLIGRAM(S): 60 SOLUTION ORAL at 05:41

## 2018-02-10 RX ADMIN — CYCLOBENZAPRINE HYDROCHLORIDE 5 MILLIGRAM(S): 10 TABLET, FILM COATED ORAL at 21:37

## 2018-02-10 RX ADMIN — HEPARIN SODIUM 5000 UNIT(S): 5000 INJECTION INTRAVENOUS; SUBCUTANEOUS at 05:40

## 2018-02-10 RX ADMIN — OXYCODONE HYDROCHLORIDE 10 MILLIGRAM(S): 5 TABLET ORAL at 06:41

## 2018-02-10 RX ADMIN — Medication 4 MILLIGRAM(S): at 05:41

## 2018-02-10 RX ADMIN — LISINOPRIL 10 MILLIGRAM(S): 2.5 TABLET ORAL at 09:30

## 2018-02-10 RX ADMIN — HEPARIN SODIUM 5000 UNIT(S): 5000 INJECTION INTRAVENOUS; SUBCUTANEOUS at 14:03

## 2018-02-10 RX ADMIN — Medication 4 MILLIGRAM(S): at 00:45

## 2018-02-10 RX ADMIN — SODIUM CHLORIDE 2 GRAM(S): 9 INJECTION INTRAMUSCULAR; INTRAVENOUS; SUBCUTANEOUS at 05:41

## 2018-02-10 RX ADMIN — Medication 100 MILLIGRAM(S): at 05:41

## 2018-02-10 RX ADMIN — NIMODIPINE 60 MILLIGRAM(S): 60 SOLUTION ORAL at 01:33

## 2018-02-10 RX ADMIN — Medication 4: at 18:03

## 2018-02-10 RX ADMIN — SENNA PLUS 2 TABLET(S): 8.6 TABLET ORAL at 21:36

## 2018-02-10 RX ADMIN — Medication 100 MILLIGRAM(S): at 21:37

## 2018-02-10 RX ADMIN — OXYCODONE HYDROCHLORIDE 10 MILLIGRAM(S): 5 TABLET ORAL at 15:00

## 2018-02-10 RX ADMIN — SODIUM CHLORIDE 75 MILLILITER(S): 9 INJECTION INTRAMUSCULAR; INTRAVENOUS; SUBCUTANEOUS at 06:05

## 2018-02-10 NOTE — PROGRESS NOTE ADULT - ASSESSMENT
68M with   1.  subarachnoid hemorrhage, s/p angio (02/06/18, Dr. Rodriguez); R Pcomm infundibular dilatation  new Diabetes Mellitus    PLAN: Day 1 02/06  Day 3 02/08  Day 7 02/12  Day 14 02/19   Day 21  02/26  NEURO: neurochecks q1h, PRN pain meds with Tylenol / fioricet  SAH:  TCD today; conventional angio on Tue, cont nimodipine 60 mg po q4h to be given for 21 days (last day 02/26)  Hydrocephalus watch, no EVD currently  Seizure prophylaxis continue VPA  REHAB:  physical therapy evaluation and management    EARLY MOB:  HOB elevated, OOB to chair    PULM:  Room air, incentive spirometry  CARDIO:  SBP goal 100-160mm Hg  ENDO:  Blood sugar goals 140-180 mg/dL, cont insulin sliding scale, off lantus due to hypoglycemic episode; A1C 8.5 lipid profile HDL 32  Total 172   GI:  docusate senna  DIET: CCD  RENAL: maintain euvolemia, accurate Is and Os, cont IVF; cont salt tabs 2g q8h  HEM/ONC: no coagulopathy (INR=1.11)  VTE Prophylaxis: SCDs, SQH  ID: afebrile, no leukocytosis  Social: will update family    Patient at high risk for neurological deterioration or death due to:    Critical care time, excluding procedures: 45 minutes. 68M with   1.  subarachnoid hemorrhage, s/p angio (02/06/18, Dr. Rodriguez); R Pcomm infundibular dilatation  2.  newly diagnosed; diabetes Mellitus    PLAN: Day 1 02/06  Day 3 02/08  Day 7 02/12  Day 14 02/19   Day 21  02/26  NEURO: neurochecks q1h, PRN pain meds with Tylenol / fioricet / oxycodone / flexeril, decadron  SAH:  TCD tomorrow; conventional angio on Mon/Tue, cont nimodipine 60 mg po q4h to be given for 21 days (last day 02/26)  Hydrocephalus watch, no EVD currently  Seizure prophylaxis not indicated  REHAB:  physical therapy evaluation and management    EARLY MOB:  HOB elevated, OOB to chair, ambulate    PULM:  Room air, incentive spirometry  CARDIO:  SBP goal 100-160mm Hg, start lisinopril 10mg daily   ENDO:  Blood sugar goals 140-180 mg/dL, cont insulin sliding scale, off lantus due to hypoglycemic episode; A1C 8.5 lipid profile HDL 32  Total 172 ; start atorvastatin 40mg daily   GI:  docusate senna standing  DIET: regular diet; add ensure  RENAL: keep IVF at 75cc/hr; maintain euvolemia, accurate Is and Os, cont IVF; increase salt tabs 2g q6h  HEM/ONC: no coagulopathy (INR=1.11)  VTE Prophylaxis: SCDs, SQH  ID: afebrile, no leukocytosis  Social: will update family    Patient at high risk for neurological deterioration or death due to:    Critical care time, excluding procedures: 45 minutes.

## 2018-02-10 NOTE — PROGRESS NOTE ADULT - SUBJECTIVE AND OBJECTIVE BOX
=================================  NEUROCRITICAL CARE ATTENDING NOTE  =================================    BARBARA LOMBARDO   MRN-[id number]  Summary:  68y/M with no PMH, presented with sudden onset of severe HA.  He took ASA and subsequently passed out (?).  Brought to [facility], where CT showed SAH MF4, HH2.  He was transferred to [facility] for further care. (2018 06:14)    Overnight Events: No significant events overnight; TCDs yesterday WNL.    PAST MEDICAL & SURGICAL HISTORY: NEG  NKDA  Home meds: none    PHYSICAL EXAMINATION  T(C): 37.2 (02-10 @ 06:27), Max: 37.7 ( @ 22:34) HR: 70 (02-10 @ 07:00) (58 - 88) BP: 133/69 (02-10 @ 07:00) (119/55 - 187/78) RR: 11 (02-10 @ 07:00) (11 - 23) SpO2: 96% (02-10 @ 07:00) (95% - 98%)  NEUROLOGIC EXAMINATION:  Patient is awake, alert, oriented x2, , pupils 3mm equal and briskly reactive to light, EOMs intact, muscle strength 5/5 on all 4 extremities  GENERAL:  not intubated, not in cardiorespiratory distress  EENT: anicteric  CARDIOVASC:  (+) S1 S2, normal rate and regular rhythm  PULMONARY:  clear to auscultation bilaterally  ABDOMEN:  soft, nontender, with normoactive bowel sounds  EXTREMITIES:  no edema  SKIN:  no rash    LABS:  CAPILLARY BLOOD GLUCOSE 161 166 131 96 122                        13.4   11.1  )-----------( 314      ( 10 Feb 2018 05:36 )             39.2     135  |  97  |  17  ----------------------------<  177<H>  3.8   |  20<L>  |  0.63    Ca    9.0      10 Feb 2018 05:37  Phos  4.0     02-10  Mg     2.0     02-10    02-09 @ 07:01  -  02-10 @ 07:00  IN: 1800 mL / OUT: 2175 mL / NET: -375 mL    Bacteriology:  CSF studies:  EEG:  Neuroimagin/07 MRI cervical spine: NEG  Other imagin/06 Echo EF 60-65%    MEDICATIONS: dexamethasone 4mg PO q6h docusate 100 TID SQH q8h mod ISS nimodipine 60mg q4h pantoprazole 40 daily senna HS salt 2 q8h fioricet PRN   MEDICATIONS: fioricet cyclobenzaprine 5mg TID dexamethasone 4mg PO q6h docusate SQH q8h mod ISS nimodipin 60mg q4h oxycodone 10mg q4h PRN pantoprazole 40 daily senna 2mg HS salt 2g q8h     IV FLUIDS: NS@75cc/hr  DRIPS:  DIET: regular  Lines:  Drains:    Wounds:    CODE STATUS:  full code                       GOALS OF CARE:  aggressive                      DISPOSITION:  ICU =================================  NEUROCRITICAL CARE ATTENDING NOTE  =================================    BARBARA LOMBARDO   MRN-[id number]  Summary:  68y/M with no PMH, presented with sudden onset of severe HA.  He took ASA and subsequently passed out (?).  Brought to [facility], where CT showed SAH MF4, HH2.  He was transferred to [facility] for further care. (2018 06:14)    Overnight Events: No significant events overnight; TCDs yesterday WNL; given labetalol / hydralazine for high BPs    PAST MEDICAL & SURGICAL HISTORY: NEG  NKDA  Home meds: none    PHYSICAL EXAMINATION  T(C): 37.2 (02-10 @ 06:27), Max: 37.7 ( @ 22:34) HR: 70 (02-10 @ 07:00) (58 - 88) BP: 133/69 (02-10 @ 07:00) (119/55 - 187/78) RR: 11 (02-10 @ 07:00) (11 - 23) SpO2: 96% (02-10 @ 07:00) (95% - 98%)  NEUROLOGIC EXAMINATION:  Patient is awake, alert, oriented x3, pupils 3mm equal and briskly reactive to light, EOMs intact, muscle strength 5/5 on all 4 extremities  GENERAL:  not intubated, not in cardiorespiratory distress  EENT: anicteric  CARDIOVASC:  (+) S1 S2, normal rate and regular rhythm  PULMONARY:  clear to auscultation bilaterally  ABDOMEN:  soft, nontender, with normoactive bowel sounds  EXTREMITIES:  no edema  SKIN:  no rash    LABS:  CAPILLARY BLOOD GLUCOSE 161 166 131 96 122             (11.4)    13.4   11.1  )-----------( 314      ( 10 Feb 2018 05:36 )             39.2   (137. 139)  135  |  97  |  17  ----------------------------<  177<H>  3.8   |  20<L>  |  0.63    Ca    9.0      10 Feb 2018 05:37  Phos  4.0     02-10  Mg     2.0     02-10    02-09 @ 07:  -  02-10 @ 07:00  IN: 1800 mL / OUT: 2175 mL / NET: -375 mL    Bacteriology:  CSF studies:  EEG:  Neuroimagin/07 MRI cervical spine: NEG  Other imagin/06 Echo EF 60-65%    MEDICATIONS: fioricet cyclobenzaprine 5mg TID dexamethasone 4mg PO q6h docusate SQH q8h mod ISS nimodipine 60mg q4h oxycodone 10mg q4h PRN pantoprazole 40 daily senna 2mg HS salt 2g q8h     IV FLUIDS: NS@75cc/hr  DRIPS:  DIET: regular  Lines:  Drains:    Wounds:    CODE STATUS:  full code                       GOALS OF CARE:  aggressive                      DISPOSITION:  ICU

## 2018-02-10 NOTE — PROGRESS NOTE ADULT - SUBJECTIVE AND OBJECTIVE BOX
S/Overnight events:        Hospital Course:   POD#       Vital Signs Last 24 Hrs  T(C): 37.2 (10 Feb 2018 06:27), Max: 37.7 (2018 22:34)  T(F): 98.9 (10 Feb 2018 06:27), Max: 99.9 (2018 22:34)  HR: 64 (10 Feb 2018 08:00) (58 - 88)  BP: 141/67 (10 Feb 2018 08:00) (119/55 - 187/78)  BP(mean): 104 (10 Feb 2018 08:) (73 - 135)  RR: 12 (10 Feb 2018 08:) (11 - 23)  SpO2: 94% (10 Feb 2018 08:) (94% - 98%)    I&O's Detail    2018 07:  -  10 Feb 2018 07:00  --------------------------------------------------------  IN:    sodium chloride 0.9%.: 1800 mL  Total IN: 1800 mL    OUT:    Voided: 2175 mL  Total OUT: 2175 mL    Total NET: -375 mL      10 Feb 2018 07:  -  10 Feb 2018 08:46  --------------------------------------------------------  IN:    sodium chloride 0.9%.: 75 mL  Total IN: 75 mL    OUT:  Total OUT: 0 mL    Total NET: 75 mL        I&O's Summary    2018 07:  -  10 Feb 2018 07:00  --------------------------------------------------------  IN: 1800 mL / OUT: 2175 mL / NET: -375 mL    10 Feb 2018 07:  -  10 Feb 2018 08:46  --------------------------------------------------------  IN: 75 mL / OUT: 0 mL / NET: 75 mL        PHYSICAL EXAM:  Gen: NAD, AAOx3  HEENT: PERRL. EOMI.  Neck: FROM, nontender  Lungs: Clear b/l  Heart: S1, S2. NSR.  Abd: Soft, NT/ND. +BS  Exts: Pulses 2+ throughout. Right groin C/D/I.  Neuro: CNs II-XII intact. 5/ str x4 Exts. Sensation to LT intact. Following commands. Speech clear.        TUBES/LINES:  [] CVC  [] A-line  [] Lumbar Drain  [] Ventriculostomy  [] Other    DIET:  [] NPO  [] Mechanical  [] Tube feeds    LABS:                        13.4   11.1  )-----------( 314      ( 10 Feb 2018 05:36 )             39.2     02-10    135  |  97  |  17  ----------------------------<  177<H>  3.8   |  20<L>  |  0.63    Ca    9.0      10 Feb 2018 05:37  Phos  4.0     02-10  Mg     2.0     02-10        Urinalysis Basic - ( 2018 19:54 )    Color: Yellow / Appearance: Clear / S.010 / pH: x  Gluc: x / Ketone: 40 mg/dL  / Bili: Negative / Urobili: 0.2 E.U./dL   Blood: x / Protein: NEGATIVE mg/dL / Nitrite: NEGATIVE   Leuk Esterase: NEGATIVE / RBC: x / WBC x   Sq Epi: x / Non Sq Epi: x / Bacteria: x          CAPILLARY BLOOD GLUCOSE      POCT Blood Glucose.: 161 mg/dL (10 Feb 2018 05:53)  POCT Blood Glucose.: 166 mg/dL (10 Feb 2018 00:47)  POCT Blood Glucose.: 131 mg/dL (2018 17:03)  POCT Blood Glucose.: 96 mg/dL (2018 11:34)  POCT Blood Glucose.: 122 mg/dL (2018 09:40)      Drug Levels: [] N/A    CSF Analysis: [] N/A      Allergies    No Known Allergies    Intolerances      MEDICATIONS:  Antibiotics:    Neuro:  acetaminophen 300 mG/butalbital 50 mG/ caffeine 40 mG 1 Capsule(s) Oral every 6 hours PRN  cyclobenzaprine 5 milliGRAM(s) Oral three times a day  metoclopramide Injectable 5 milliGRAM(s) IV Push every 6 hours PRN  ondansetron Injectable 4 milliGRAM(s) IV Push every 6 hours PRN  oxyCODONE    IR 10 milliGRAM(s) Oral every 4 hours PRN    Anticoagulation:  heparin  Injectable 5000 Unit(s) SubCutaneous every 8 hours    OTHER:  dexamethasone     Tablet 4 milliGRAM(s) Oral every 6 hours  docusate sodium 100 milliGRAM(s) Oral three times a day  hydrALAZINE Injectable 10 milliGRAM(s) IV Push every 6 hours PRN  influenza   Vaccine 0.5 milliLiter(s) IntraMuscular once  insulin lispro (HumaLOG) corrective regimen sliding scale   SubCutaneous every 6 hours  labetalol Injectable 10 milliGRAM(s) IV Push every 6 hours PRN  magnesium hydroxide Suspension 30 milliLiter(s) Oral daily PRN  niMODipine 60 milliGRAM(s) Oral every 4 hours  pantoprazole    Tablet 40 milliGRAM(s) Oral before breakfast  senna 2 Tablet(s) Oral at bedtime    IVF:  sodium chloride 2 Gram(s) Oral every 8 hours  sodium chloride 0.9%. 1000 milliLiter(s) IV Continuous <Continuous>    CULTURES:    RADIOLOGY & ADDITIONAL TESTS:      ASSESSMENT:  68y Male s/p         PLAN:  NEURO:    CARDIOVASCULAR:    PULMONARY:    RENAL:    GI:    HEME:    ID:    ENDO:    DISPOSITION: S/Overnight events:  Reports headaches and poor appetite. Drinking fluids      Hospital Course:   2/10 - Continues to complain of headaches and poor appetite. Persistent hypertension. Clinically stable      Vital Signs Last 24 Hrs  T(C): 37.2 (10 Feb 2018 06:27), Max: 37.7 (2018 22:34)  T(F): 98.9 (10 Feb 2018 06:27), Max: 99.9 (2018 22:34)  HR: 64 (10 Feb 2018 08:) (58 - 88)  BP: 141/67 (10 Feb 2018 08:) (119/55 - 187/78)  BP(mean): 104 (10 Feb 2018 08:) (73 - 135)  RR: 12 (10 Feb 2018 08:) (11 - 23)  SpO2: 94% (10 Feb 2018 08:) (94% - 98%)    I&O's Detail    2018 07:  -  10 Feb 2018 07:00  --------------------------------------------------------  IN:    sodium chloride 0.9%.: 1800 mL  Total IN: 1800 mL    OUT:    Voided: 2175 mL  Total OUT: 2175 mL    Total NET: -375 mL      10 Feb 2018 07:  -  10 Feb 2018 08:46  --------------------------------------------------------  IN:    sodium chloride 0.9%.: 75 mL  Total IN: 75 mL    OUT:  Total OUT: 0 mL    Total NET: 75 mL        I&O's Summary    2018 07:  -  10 Feb 2018 07:00  --------------------------------------------------------  IN: 1800 mL / OUT: 2175 mL / NET: -375 mL    10 Feb 2018 07:01  -  10 Feb 2018 08:46  --------------------------------------------------------  IN: 75 mL / OUT: 0 mL / NET: 75 mL        PHYSICAL EXAM:  Gen: NAD, AAOx3  HEENT: PERRL. EOMI.  Neck: FROM, nontender  Lungs: Clear b/l  Heart: S1, S2. NSR.  Abd: Soft, NT/ND. +BS  Exts: Pulses 2+ throughout. Right groin C/D/I.  Neuro: CNs II-XII intact.  str x4 Exts. Sensation to LT intact. Following commands. Speech clear.        TUBES/LINES:  [] CVC  [] A-line  [] Lumbar Drain  [] Ventriculostomy  [] Other    DIET:  [] NPO  [] Mechanical  [] Tube feeds    LABS:                        13.4   11.1  )-----------( 314      ( 10 Feb 2018 05:36 )             39.2     02-10    135  |  97  |  17  ----------------------------<  177<H>  3.8   |  20<L>  |  0.63    Ca    9.0      10 Feb 2018 05:37  Phos  4.0     02-10  Mg     2.0     02-10        Urinalysis Basic - ( 2018 19:54 )    Color: Yellow / Appearance: Clear / S.010 / pH: x  Gluc: x / Ketone: 40 mg/dL  / Bili: Negative / Urobili: 0.2 E.U./dL   Blood: x / Protein: NEGATIVE mg/dL / Nitrite: NEGATIVE   Leuk Esterase: NEGATIVE / RBC: x / WBC x   Sq Epi: x / Non Sq Epi: x / Bacteria: x          CAPILLARY BLOOD GLUCOSE      POCT Blood Glucose.: 161 mg/dL (10 Feb 2018 05:53)  POCT Blood Glucose.: 166 mg/dL (10 Feb 2018 00:47)  POCT Blood Glucose.: 131 mg/dL (2018 17:03)  POCT Blood Glucose.: 96 mg/dL (2018 11:34)  POCT Blood Glucose.: 122 mg/dL (2018 09:40)      Drug Levels: [] N/A    CSF Analysis: [] N/A      Allergies    No Known Allergies    Intolerances      MEDICATIONS:  Antibiotics:    Neuro:  acetaminophen 300 mG/butalbital 50 mG/ caffeine 40 mG 1 Capsule(s) Oral every 6 hours PRN  cyclobenzaprine 5 milliGRAM(s) Oral three times a day  metoclopramide Injectable 5 milliGRAM(s) IV Push every 6 hours PRN  ondansetron Injectable 4 milliGRAM(s) IV Push every 6 hours PRN  oxyCODONE    IR 10 milliGRAM(s) Oral every 4 hours PRN    Anticoagulation:  heparin  Injectable 5000 Unit(s) SubCutaneous every 8 hours    OTHER:  dexamethasone     Tablet 4 milliGRAM(s) Oral every 6 hours  docusate sodium 100 milliGRAM(s) Oral three times a day  hydrALAZINE Injectable 10 milliGRAM(s) IV Push every 6 hours PRN  influenza   Vaccine 0.5 milliLiter(s) IntraMuscular once  insulin lispro (HumaLOG) corrective regimen sliding scale   SubCutaneous every 6 hours  labetalol Injectable 10 milliGRAM(s) IV Push every 6 hours PRN  magnesium hydroxide Suspension 30 milliLiter(s) Oral daily PRN  niMODipine 60 milliGRAM(s) Oral every 4 hours  pantoprazole    Tablet 40 milliGRAM(s) Oral before breakfast  senna 2 Tablet(s) Oral at bedtime    IVF:  sodium chloride 2 Gram(s) Oral every 8 hours  sodium chloride 0.9%. 1000 milliLiter(s) IV Continuous <Continuous>    CULTURES:    RADIOLOGY & ADDITIONAL TESTS:      ASSESSMENT:  68y Male with spontaneous SAH s/p cerebral angiogram POD#4 without findings of vascular abnormalities, PBD#4        PLAN:  NEURO: Continue Nimodipine until ,  Plan for repeat cerebral angiogram, possibly Monday  Neuro checks q1h,  Cont pain meds PRN,  Continue Decadron  TCDs every other day      CARDIOVASCULAR: -160, will start Lisinopril for better control  Daily labs, electrolyte repletion  Atorvastatin 40mg daily    PULMONARY: IS, satting well on RA    RENAL: Voiding, no aguirre    GI: PO diet, encourage PO intake. IVF. Stool softeners    HEME: SCDs, SQH    ID: Afebrile    ENDO: ISS    DISPOSITION: Continue SICU care for hydrocephalus jermaine  D/w Dr. Josh Robertson S/Overnight events:  Reports headaches and poor appetite. Drinking fluids.      Hospital Course:    day: CT showing SAH w no mass effect. Cerebral angio showed infundibular dilatation, no aneurysm, AVM or other pathology.   : Reimaging showing decreased blood. Pt continuing to complain of headache, predominantly occipital. Nicardipine d/c'ed, BP meds PRN for parameter: systolic >160. Denies fever, chills, chest pain, SOB, nausea, vomiting, LE swelling or pain.   : Headache improved from yesterday; pt still states is severe. No BM since before admission as well as significantly diminished appetite. Pt more lethargic than yesterday but continues to be arousable and oriented.   : Pt continues to endorse severe headache despite pain control measures. Very low appetite continues, and fingersticks have been low as a result (lantus was not given last night). Still no BM since admission. Plan for TCD today.   2/10 - Continues to complain of headaches and poor appetite. Persistent hypertension. Clinically stable      Vital Signs Last 24 Hrs  T(C): 37.2 (10 Feb 2018 06:27), Max: 37.7 (2018 22:34)  T(F): 98.9 (10 Feb 2018 06:27), Max: 99.9 (2018 22:34)  HR: 64 (10 Feb 2018 08:00) (58 - 88)  BP: 141/67 (10 Feb 2018 08:00) (119/55 - 187/78)  BP(mean): 104 (10 Feb 2018 08:00) (73 - 135)  RR: 12 (10 Feb 2018 08:00) (11 - 23)  SpO2: 94% (10 Feb 2018 08:00) (94% - 98%)    I&O's Detail    2018 07:01  -  10 Feb 2018 07:00  --------------------------------------------------------  IN:    sodium chloride 0.9%.: 1800 mL  Total IN: 1800 mL    OUT:    Voided: 2175 mL  Total OUT: 2175 mL    Total NET: -375 mL      10 Feb 2018 07:  -  10 Feb 2018 08:46  --------------------------------------------------------  IN:    sodium chloride 0.9%.: 75 mL  Total IN: 75 mL    OUT:  Total OUT: 0 mL    Total NET: 75 mL        I&O's Summary    2018 07:  -  10 Feb 2018 07:00  --------------------------------------------------------  IN: 1800 mL / OUT: 2175 mL / NET: -375 mL    10 Feb 2018 07:  -  10 Feb 2018 08:46  --------------------------------------------------------  IN: 75 mL / OUT: 0 mL / NET: 75 mL        PHYSICAL EXAM:  Gen: NAD, AAOx3  HEENT: PERRL. EOMI.  Neck: FROM, nontender  Lungs: Clear b/l  Heart: S1, S2. NSR.  Abd: Soft, NT/ND. +BS  Exts: Pulses 2+ throughout. Right groin C/D/I.  Neuro: CNs II-XII intact. 5/5 str x4 Exts. Sensation to LT intact. Following commands. Speech clear.        TUBES/LINES:  [] CVC  [] A-line  [] Lumbar Drain  [] Ventriculostomy  [] Other    DIET:  [] NPO  [x] Mechanical  [] Tube feeds    LABS:                        13.4   11.1  )-----------( 314      ( 10 Feb 2018 05:36 )             39.2     02-10    135  |  97  |  17  ----------------------------<  177<H>  3.8   |  20<L>  |  0.63    Ca    9.0      10 Feb 2018 05:37  Phos  4.0     02-10  Mg     2.0     02-10        Urinalysis Basic - ( 2018 19:54 )    Color: Yellow / Appearance: Clear / S.010 / pH: x  Gluc: x / Ketone: 40 mg/dL  / Bili: Negative / Urobili: 0.2 E.U./dL   Blood: x / Protein: NEGATIVE mg/dL / Nitrite: NEGATIVE   Leuk Esterase: NEGATIVE / RBC: x / WBC x   Sq Epi: x / Non Sq Epi: x / Bacteria: x          CAPILLARY BLOOD GLUCOSE      POCT Blood Glucose.: 161 mg/dL (10 Feb 2018 05:53)  POCT Blood Glucose.: 166 mg/dL (10 Feb 2018 00:47)  POCT Blood Glucose.: 131 mg/dL (2018 17:03)  POCT Blood Glucose.: 96 mg/dL (2018 11:34)  POCT Blood Glucose.: 122 mg/dL (2018 09:40)      Drug Levels: [] N/A    CSF Analysis: [] N/A      Allergies    No Known Allergies    Intolerances      MEDICATIONS:  Antibiotics:    Neuro:  acetaminophen 300 mG/butalbital 50 mG/ caffeine 40 mG 1 Capsule(s) Oral every 6 hours PRN  cyclobenzaprine 5 milliGRAM(s) Oral three times a day  metoclopramide Injectable 5 milliGRAM(s) IV Push every 6 hours PRN  ondansetron Injectable 4 milliGRAM(s) IV Push every 6 hours PRN  oxyCODONE    IR 10 milliGRAM(s) Oral every 4 hours PRN    Anticoagulation:  heparin  Injectable 5000 Unit(s) SubCutaneous every 8 hours    OTHER:  dexamethasone     Tablet 4 milliGRAM(s) Oral every 6 hours  docusate sodium 100 milliGRAM(s) Oral three times a day  hydrALAZINE Injectable 10 milliGRAM(s) IV Push every 6 hours PRN  influenza   Vaccine 0.5 milliLiter(s) IntraMuscular once  insulin lispro (HumaLOG) corrective regimen sliding scale   SubCutaneous every 6 hours  labetalol Injectable 10 milliGRAM(s) IV Push every 6 hours PRN  magnesium hydroxide Suspension 30 milliLiter(s) Oral daily PRN  niMODipine 60 milliGRAM(s) Oral every 4 hours  pantoprazole    Tablet 40 milliGRAM(s) Oral before breakfast  senna 2 Tablet(s) Oral at bedtime    IVF:  sodium chloride 2 Gram(s) Oral every 8 hours  sodium chloride 0.9%. 1000 milliLiter(s) IV Continuous <Continuous>    CULTURES:    RADIOLOGY & ADDITIONAL TESTS:      ASSESSMENT:  68y Male with spontaneous SAH s/p cerebral angiogram POD#4 without findings of vascular abnormalities, PBD#4        PLAN:  NEURO: Continue Nimodipine until ,  Plan for repeat cerebral angiogram, possibly Monday  Neuro checks q1h,  Cont pain meds PRN,  Continue Decadron  TCDs every other day      CARDIOVASCULAR: -160, will start Lisinopril for better control  Daily labs, electrolyte repletion  Atorvastatin 40mg daily    PULMONARY: IS, satting well on RA    RENAL: Voiding, no aguirre    GI: PO diet, encourage PO intake. IVF. Stool softeners    HEME: SCDs, SQH    ID: Afebrile    ENDO: ISS    DISPOSITION: Continue SICU care for hydrocephalus watch,  D/w Dr. Slaughter, Dr. Moreno

## 2018-02-11 LAB
ANION GAP SERPL CALC-SCNC: 11 MMOL/L — SIGNIFICANT CHANGE UP (ref 5–17)
ANION GAP SERPL CALC-SCNC: 13 MMOL/L — SIGNIFICANT CHANGE UP (ref 5–17)
BUN SERPL-MCNC: 19 MG/DL — SIGNIFICANT CHANGE UP (ref 7–23)
BUN SERPL-MCNC: 21 MG/DL — SIGNIFICANT CHANGE UP (ref 7–23)
CALCIUM SERPL-MCNC: 8.5 MG/DL — SIGNIFICANT CHANGE UP (ref 8.4–10.5)
CALCIUM SERPL-MCNC: 8.8 MG/DL — SIGNIFICANT CHANGE UP (ref 8.4–10.5)
CHLORIDE SERPL-SCNC: 98 MMOL/L — SIGNIFICANT CHANGE UP (ref 96–108)
CHLORIDE SERPL-SCNC: 98 MMOL/L — SIGNIFICANT CHANGE UP (ref 96–108)
CO2 SERPL-SCNC: 24 MMOL/L — SIGNIFICANT CHANGE UP (ref 22–31)
CO2 SERPL-SCNC: 24 MMOL/L — SIGNIFICANT CHANGE UP (ref 22–31)
CREAT SERPL-MCNC: 0.59 MG/DL — SIGNIFICANT CHANGE UP (ref 0.5–1.3)
CREAT SERPL-MCNC: 0.6 MG/DL — SIGNIFICANT CHANGE UP (ref 0.5–1.3)
GLUCOSE SERPL-MCNC: 227 MG/DL — HIGH (ref 70–99)
GLUCOSE SERPL-MCNC: 284 MG/DL — HIGH (ref 70–99)
HCT VFR BLD CALC: 36.2 % — LOW (ref 39–50)
HGB BLD-MCNC: 12.3 G/DL — LOW (ref 13–17)
MAGNESIUM SERPL-MCNC: 2.2 MG/DL — SIGNIFICANT CHANGE UP (ref 1.6–2.6)
MCHC RBC-ENTMCNC: 31.4 PG — SIGNIFICANT CHANGE UP (ref 27–34)
MCHC RBC-ENTMCNC: 34 G/DL — SIGNIFICANT CHANGE UP (ref 32–36)
MCV RBC AUTO: 92.3 FL — SIGNIFICANT CHANGE UP (ref 80–100)
PHOSPHATE SERPL-MCNC: 3.2 MG/DL — SIGNIFICANT CHANGE UP (ref 2.5–4.5)
PLATELET # BLD AUTO: 282 K/UL — SIGNIFICANT CHANGE UP (ref 150–400)
POTASSIUM SERPL-MCNC: 4.8 MMOL/L — SIGNIFICANT CHANGE UP (ref 3.5–5.3)
POTASSIUM SERPL-MCNC: 5.2 MMOL/L — SIGNIFICANT CHANGE UP (ref 3.5–5.3)
POTASSIUM SERPL-SCNC: 4.8 MMOL/L — SIGNIFICANT CHANGE UP (ref 3.5–5.3)
POTASSIUM SERPL-SCNC: 5.2 MMOL/L — SIGNIFICANT CHANGE UP (ref 3.5–5.3)
RBC # BLD: 3.92 M/UL — LOW (ref 4.2–5.8)
RBC # FLD: 11.9 % — SIGNIFICANT CHANGE UP (ref 10.3–16.9)
SODIUM SERPL-SCNC: 133 MMOL/L — LOW (ref 135–145)
SODIUM SERPL-SCNC: 135 MMOL/L — SIGNIFICANT CHANGE UP (ref 135–145)
WBC # BLD: 11.7 K/UL — HIGH (ref 3.8–10.5)
WBC # FLD AUTO: 11.7 K/UL — HIGH (ref 3.8–10.5)

## 2018-02-11 PROCEDURE — 99291 CRITICAL CARE FIRST HOUR: CPT | Mod: 24

## 2018-02-11 RX ORDER — INSULIN GLARGINE 100 [IU]/ML
12 INJECTION, SOLUTION SUBCUTANEOUS ONCE
Qty: 0 | Refills: 0 | Status: COMPLETED | OUTPATIENT
Start: 2018-02-11 | End: 2018-02-11

## 2018-02-11 RX ORDER — SODIUM CHLORIDE 9 MG/ML
1000 INJECTION INTRAMUSCULAR; INTRAVENOUS; SUBCUTANEOUS
Qty: 0 | Refills: 0 | Status: DISCONTINUED | OUTPATIENT
Start: 2018-02-11 | End: 2018-02-11

## 2018-02-11 RX ORDER — INSULIN LISPRO 100/ML
4 VIAL (ML) SUBCUTANEOUS
Qty: 0 | Refills: 0 | Status: DISCONTINUED | OUTPATIENT
Start: 2018-02-11 | End: 2018-02-14

## 2018-02-11 RX ORDER — POLYETHYLENE GLYCOL 3350 17 G/17G
17 POWDER, FOR SOLUTION ORAL DAILY
Qty: 0 | Refills: 0 | Status: DISCONTINUED | OUTPATIENT
Start: 2018-02-11 | End: 2018-02-15

## 2018-02-11 RX ORDER — DEXAMETHASONE 0.5 MG/5ML
4 ELIXIR ORAL EVERY 8 HOURS
Qty: 0 | Refills: 0 | Status: DISCONTINUED | OUTPATIENT
Start: 2018-02-11 | End: 2018-02-12

## 2018-02-11 RX ORDER — SODIUM CHLORIDE 9 MG/ML
1000 INJECTION INTRAMUSCULAR; INTRAVENOUS; SUBCUTANEOUS
Qty: 0 | Refills: 0 | Status: DISCONTINUED | OUTPATIENT
Start: 2018-02-11 | End: 2018-02-12

## 2018-02-11 RX ADMIN — ATORVASTATIN CALCIUM 40 MILLIGRAM(S): 80 TABLET, FILM COATED ORAL at 21:36

## 2018-02-11 RX ADMIN — HEPARIN SODIUM 5000 UNIT(S): 5000 INJECTION INTRAVENOUS; SUBCUTANEOUS at 14:11

## 2018-02-11 RX ADMIN — NIMODIPINE 60 MILLIGRAM(S): 60 SOLUTION ORAL at 14:11

## 2018-02-11 RX ADMIN — INSULIN GLARGINE 12 UNIT(S): 100 INJECTION, SOLUTION SUBCUTANEOUS at 10:18

## 2018-02-11 RX ADMIN — Medication 100 MILLIGRAM(S): at 21:35

## 2018-02-11 RX ADMIN — Medication 100 MILLIGRAM(S): at 05:17

## 2018-02-11 RX ADMIN — NIMODIPINE 60 MILLIGRAM(S): 60 SOLUTION ORAL at 17:18

## 2018-02-11 RX ADMIN — OXYCODONE HYDROCHLORIDE 10 MILLIGRAM(S): 5 TABLET ORAL at 17:12

## 2018-02-11 RX ADMIN — Medication 4 MILLIGRAM(S): at 05:17

## 2018-02-11 RX ADMIN — CYCLOBENZAPRINE HYDROCHLORIDE 5 MILLIGRAM(S): 10 TABLET, FILM COATED ORAL at 14:11

## 2018-02-11 RX ADMIN — Medication 4 UNIT(S): at 17:17

## 2018-02-11 RX ADMIN — Medication 6: at 12:05

## 2018-02-11 RX ADMIN — NIMODIPINE 60 MILLIGRAM(S): 60 SOLUTION ORAL at 05:17

## 2018-02-11 RX ADMIN — Medication 4 UNIT(S): at 12:05

## 2018-02-11 RX ADMIN — OXYCODONE HYDROCHLORIDE 5 MILLIGRAM(S): 5 TABLET ORAL at 01:00

## 2018-02-11 RX ADMIN — Medication 4 MILLIGRAM(S): at 00:23

## 2018-02-11 RX ADMIN — NIMODIPINE 60 MILLIGRAM(S): 60 SOLUTION ORAL at 21:35

## 2018-02-11 RX ADMIN — POLYETHYLENE GLYCOL 3350 17 GRAM(S): 17 POWDER, FOR SOLUTION ORAL at 12:06

## 2018-02-11 RX ADMIN — OXYCODONE HYDROCHLORIDE 5 MILLIGRAM(S): 5 TABLET ORAL at 00:23

## 2018-02-11 RX ADMIN — PANTOPRAZOLE SODIUM 40 MILLIGRAM(S): 20 TABLET, DELAYED RELEASE ORAL at 07:44

## 2018-02-11 RX ADMIN — SODIUM CHLORIDE 2 GRAM(S): 9 INJECTION INTRAMUSCULAR; INTRAVENOUS; SUBCUTANEOUS at 00:24

## 2018-02-11 RX ADMIN — CYCLOBENZAPRINE HYDROCHLORIDE 5 MILLIGRAM(S): 10 TABLET, FILM COATED ORAL at 05:17

## 2018-02-11 RX ADMIN — Medication 6: at 00:32

## 2018-02-11 RX ADMIN — Medication 2: at 17:17

## 2018-02-11 RX ADMIN — NIMODIPINE 60 MILLIGRAM(S): 60 SOLUTION ORAL at 02:07

## 2018-02-11 RX ADMIN — SODIUM CHLORIDE 2 GRAM(S): 9 INJECTION INTRAMUSCULAR; INTRAVENOUS; SUBCUTANEOUS at 05:17

## 2018-02-11 RX ADMIN — SODIUM CHLORIDE 75 MILLILITER(S): 9 INJECTION INTRAMUSCULAR; INTRAVENOUS; SUBCUTANEOUS at 07:10

## 2018-02-11 RX ADMIN — Medication 1 CAPSULE(S): at 18:45

## 2018-02-11 RX ADMIN — SODIUM CHLORIDE 2 GRAM(S): 9 INJECTION INTRAMUSCULAR; INTRAVENOUS; SUBCUTANEOUS at 23:35

## 2018-02-11 RX ADMIN — Medication 100 MILLIGRAM(S): at 14:11

## 2018-02-11 RX ADMIN — NIMODIPINE 60 MILLIGRAM(S): 60 SOLUTION ORAL at 10:18

## 2018-02-11 RX ADMIN — Medication 1 CAPSULE(S): at 05:17

## 2018-02-11 RX ADMIN — SODIUM CHLORIDE 2 GRAM(S): 9 INJECTION INTRAMUSCULAR; INTRAVENOUS; SUBCUTANEOUS at 12:06

## 2018-02-11 RX ADMIN — LISINOPRIL 10 MILLIGRAM(S): 2.5 TABLET ORAL at 05:22

## 2018-02-11 RX ADMIN — HEPARIN SODIUM 5000 UNIT(S): 5000 INJECTION INTRAVENOUS; SUBCUTANEOUS at 05:17

## 2018-02-11 RX ADMIN — Medication 1 CAPSULE(S): at 06:00

## 2018-02-11 RX ADMIN — Medication 4: at 05:30

## 2018-02-11 RX ADMIN — Medication 1 CAPSULE(S): at 21:41

## 2018-02-11 RX ADMIN — Medication 4: at 23:35

## 2018-02-11 RX ADMIN — HEPARIN SODIUM 5000 UNIT(S): 5000 INJECTION INTRAVENOUS; SUBCUTANEOUS at 21:36

## 2018-02-11 RX ADMIN — OXYCODONE HYDROCHLORIDE 10 MILLIGRAM(S): 5 TABLET ORAL at 16:08

## 2018-02-11 RX ADMIN — Medication 4 MILLIGRAM(S): at 14:26

## 2018-02-11 RX ADMIN — SODIUM CHLORIDE 2 GRAM(S): 9 INJECTION INTRAMUSCULAR; INTRAVENOUS; SUBCUTANEOUS at 17:18

## 2018-02-11 RX ADMIN — Medication 4 MILLIGRAM(S): at 21:36

## 2018-02-11 NOTE — PROGRESS NOTE ADULT - ASSESSMENT
68M with   1.  subarachnoid hemorrhage, s/p angio (02/06/18, Dr. Rodriguez); R Pcomm infundibular dilatation  2.  newly diagnosed; diabetes Mellitus    PLAN: Day 1 02/06  Day 3 02/08  Day 7 02/12  Day 14 02/19   Day 21  02/26  NEURO: neurochecks q1h, PRN pain meds with Tylenol / fioricet / oxycodone / flexeril, decadron  SAH:  TCD tomorrow; conventional angio on Mon/Tue, cont nimodipine 60 mg po q4h to be given for 21 days (last day 02/26)  Hydrocephalus watch, no EVD currently  Seizure prophylaxis not indicated  REHAB:  physical therapy evaluation and management    EARLY MOB:  HOB elevated, OOB to chair, ambulate    PULM:  Room air, incentive spirometry  CARDIO:  SBP goal 100-160mm Hg, start lisinopril 10mg daily   ENDO:  Blood sugar goals 140-180 mg/dL, cont insulin sliding scale, off lantus due to hypoglycemic episode; A1C 8.5 lipid profile HDL 32  Total 172 ; start atorvastatin 40mg daily   GI:  docusate senna standing  DIET: regular diet; add ensure  RENAL: keep IVF at 75cc/hr; maintain euvolemia, accurate Is and Os, cont IVF; increase salt tabs 2g q6h  HEM/ONC: no coagulopathy (INR=1.11)  VTE Prophylaxis: SCDs, SQH  ID: afebrile, no leukocytosis  Social: will update family    Patient at high risk for neurological deterioration or death due to:    Critical care time, excluding procedures: 45 minutes. 68M with   1.  subarachnoid hemorrhage, s/p angio (02/06/18, Dr. Rodriguez); R Pcomm infundibular dilatation  2.  newly diagnosed; diabetes Mellitus    PLAN: Day 1 02/06  Day 3 02/08  Day 7 02/12  Day 14 02/19   Day 21  02/26  NEURO: neurochecks q2h, PRN pain meds with Tylenol / fioricet / oxycodone / flexeril, decrease decadron to 4 q8h  SAH:  TCD today; conventional angio on Mon, cont nimodipine 60 mg po q4h to be given for 21 days (last day 02/26)  Hydrocephalus watch, no EVD currently  Seizure prophylaxis not indicated  REHAB:  physical therapy evaluation and management    EARLY MOB:  HOB elevated, OOB to chair, ambulate    PULM:  Room air, incentive spirometry  CARDIO:  SBP goal 100-160mm Hg, lisinopril 10mg daily   ENDO:  Blood sugar goals 140-180 mg/dL, cont insulin sliding scale, A1C 8.5 lipid profile HDL 32  Total 172 ; atorvastatin 40mg daily ; lantus 12 this morning; HR 4 units premeals  GI:  docusate senna, miralax  DIET: regular diet; ensure  RENAL: keep IVF at 50cc/hr; maintain euvolemia, accurate Is and Os, cont IVF; salt tabs 2g q6h; BMP this PM  HEM/ONC: no coagulopathy (INR=1.11)  VTE Prophylaxis: SCDs, SQH  ID: afebrile, no leukocytosis  Social: will update family    Patient at high risk for neurological deterioration or death due to:    Critical care time, excluding procedures: 45 minutes.

## 2018-02-11 NOTE — PROGRESS NOTE ADULT - SUBJECTIVE AND OBJECTIVE BOX
Surgery: Diagnostic Cerebral Angiogram  Consent: Signed by patient    No Known Allergies      OVERNIGHT EVENTS: none    T(C): 37.3 (02-11-18 @ 14:13), Max: 37.3 (02-11-18 @ 14:13)  HR: 60 (02-11-18 @ 17:21) (54 - 78)  BP: 164/79 (02-11-18 @ 17:21) (105/65 - 189/79)  RR: 12 (02-11-18 @ 17:21) (10 - 21)  SpO2: 96% (02-11-18 @ 17:21) (92% - 98%)  Wt(kg): --    EXAM: NAD, AAOx3  PERRL. EOMI. VF intact  Neck FROM  CNs II-XII intact. 5/5 str x4 exts. Following commands. Speech clear.      02-11    135  |  98  |  21  ----------------------------<  284<H>  4.8   |  24  |  0.60    Ca    8.8      11 Feb 2018 12:21  Phos  3.2     02-11  Mg     2.2     02-11      CBC Full  -  ( 11 Feb 2018 05:46 )  WBC Count : 11.7 K/uL  Hemoglobin : 12.3 g/dL  Hematocrit : 36.2 %  Platelet Count - Automated : 282 K/uL  Mean Cell Volume : 92.3 fL  Mean Cell Hemoglobin : 31.4 pg  Mean Cell Hemoglobin Concentration : 34.0 g/dL          Type & Screen (in past 72hrs): in AM    CXR: WNL  EKG: NSR  ECHO: EF 60-65%  Medical Clearances: n/a  Other Clearances: n/a    Last dose of antiplatelet/anticoagulation drug: n/a      Assessment: 68 male s/ SAH    Plan: Diagnostic Cerebral Angiogram in AM,  Consent in chart,  NPO/IVF @ midnight,  AM labs w/ T&C and coag studies  D/w Dr. Rodriguez

## 2018-02-11 NOTE — PROGRESS NOTE ADULT - SUBJECTIVE AND OBJECTIVE BOX
S/Overnight events:  No significant events. Glucose remains high.      Hospital Course:    2/6 day: CT showing SAH w no mass effect. Cerebral angio showed infundibular dilatation, no aneurysm, AVM or other pathology.   2/7: Reimaging showing decreased blood. Pt continuing to complain of headache, predominantly occipital. Nicardipine d/c'ed, BP meds PRN for parameter: systolic >160. Denies fever, chills, chest pain, SOB, nausea, vomiting, LE swelling or pain.   2/8: Headache improved from yesterday; pt still states is severe. No BM since before admission as well as significantly diminished appetite. Pt more lethargic than yesterday but continues to be arousable and oriented.   2/9: Pt continues to endorse severe headache despite pain control measures. Very low appetite continues, and fingersticks have been low as a result (lantus was not given last night). Still no BM since admission. Plan for TCD today.   2/10 - Continues to complain of headaches and poor appetite. Persistent hypertension. Clinically stable      Vital Signs Last 24 Hrs  T(C): 36.7 (11 Feb 2018 05:05), Max: 37.4 (10 Feb 2018 14:10)  T(F): 98.1 (11 Feb 2018 05:05), Max: 99.3 (10 Feb 2018 14:10)  HR: 62 (11 Feb 2018 09:00) (54 - 78)  BP: 142/67 (11 Feb 2018 09:00) (105/65 - 154/67)  BP(mean): 92 (11 Feb 2018 09:00) (58 - 115)  RR: 16 (11 Feb 2018 09:00) (10 - 21)  SpO2: 97% (11 Feb 2018 09:00) (92% - 98%)    I&O's Detail    10 Feb 2018 07:01  -  11 Feb 2018 07:00  --------------------------------------------------------  IN:    Oral Fluid: 1100 mL    sodium chloride 0.9%: 1800 mL  Total IN: 2900 mL    OUT:    Voided: 2050 mL  Total OUT: 2050 mL    Total NET: 850 mL      11 Feb 2018 07:01  -  11 Feb 2018 09:28  --------------------------------------------------------  IN:    Oral Fluid: 300 mL    sodium chloride 0.9%: 150 mL  Total IN: 450 mL    OUT:  Total OUT: 0 mL    Total NET: 450 mL        I&O's Summary    10 Feb 2018 07:01 - 11 Feb 2018 07:00  --------------------------------------------------------  IN: 2900 mL / OUT: 2050 mL / NET: 850 mL    11 Feb 2018 07:01  -  11 Feb 2018 09:28  --------------------------------------------------------  IN: 450 mL / OUT: 0 mL / NET: 450 mL        PHYSICAL EXAM:  Gen: NAD, AAOx3  HEENT: PERRL. EOMI.  Neck: FROM, nontender  Lungs: Clear b/l  Heart: S1, S2. NSR.  Abd: Soft, NT/ND. +BS  Exts: Pulses 2+ throughout. Right groin C/D/I.  Neuro: CNs II-XII intact. 5/5 str x4 Exts. Sensation to LT intact. Following commands. Speech clear.    TUBES/LINES:  [] CVC  [] A-line  [] Lumbar Drain  [] Ventriculostomy  [] Other    DIET:  [] NPO  [x] Mechanical  [] Tube feeds    LABS:                        12.3   11.7  )-----------( 282      ( 11 Feb 2018 05:46 )             36.2     02-11    133<L>  |  98  |  19  ----------------------------<  227<H>  5.2   |  24  |  0.59    Ca    8.5      11 Feb 2018 05:46  Phos  3.2     02-11  Mg     2.2     02-11              CAPILLARY BLOOD GLUCOSE      POCT Blood Glucose.: 222 mg/dL (11 Feb 2018 05:24)  POCT Blood Glucose.: 261 mg/dL (11 Feb 2018 00:28)  POCT Blood Glucose.: 310 mg/dL (11 Feb 2018 00:27)  POCT Blood Glucose.: 210 mg/dL (10 Feb 2018 16:48)  POCT Blood Glucose.: 304 mg/dL (10 Feb 2018 11:05)      Drug Levels: [] N/A    CSF Analysis: [] N/A      Allergies    No Known Allergies    Intolerances      MEDICATIONS:  Antibiotics:    Neuro:  acetaminophen 300 mG/butalbital 50 mG/ caffeine 40 mG 1 Capsule(s) Oral every 6 hours PRN  cyclobenzaprine 5 milliGRAM(s) Oral three times a day  metoclopramide Injectable 5 milliGRAM(s) IV Push every 6 hours PRN  ondansetron Injectable 4 milliGRAM(s) IV Push every 6 hours PRN  oxyCODONE    IR 10 milliGRAM(s) Oral every 4 hours PRN  oxyCODONE    IR 5 milliGRAM(s) Oral every 4 hours PRN    Anticoagulation:  heparin  Injectable 5000 Unit(s) SubCutaneous every 8 hours    OTHER:  atorvastatin 40 milliGRAM(s) Oral at bedtime  dexamethasone     Tablet 4 milliGRAM(s) Oral every 6 hours  docusate sodium 100 milliGRAM(s) Oral three times a day  hydrALAZINE Injectable 10 milliGRAM(s) IV Push every 6 hours PRN  influenza   Vaccine 0.5 milliLiter(s) IntraMuscular once  insulin lispro (HumaLOG) corrective regimen sliding scale   SubCutaneous every 6 hours  labetalol Injectable 10 milliGRAM(s) IV Push every 6 hours PRN  lisinopril 10 milliGRAM(s) Oral daily  magnesium hydroxide Suspension 30 milliLiter(s) Oral daily PRN  niMODipine 60 milliGRAM(s) Oral every 4 hours  pantoprazole    Tablet 40 milliGRAM(s) Oral before breakfast  senna 2 Tablet(s) Oral at bedtime    IVF:  sodium chloride 2 Gram(s) Oral every 6 hours    CULTURES:    RADIOLOGY & ADDITIONAL TESTS:      ASSESSMENT:  68y Male with spontaneous SAH s/p cerebral angiogram POD#4 without findings of vascular abnormalities, PBD#5      PLAN:  NEURO: Continue Nimodipine until 2/26,  Plan for repeat cerebral angiogram Monday, pre-op today.  Neuro checks q2h,  Cont pain meds PRN,  Continue Decadron  TCDs every other day - to be done today      CARDIOVASCULAR: -160, controlled  Daily labs, electrolyte repletion  Atorvastatin 40mg daily, Lisinopril    PULMONARY: IS, satting well on RA    RENAL: Voiding, no aguirre    GI: PO diet, encourage PO intake. IVF @50cc/hr. Stool softeners    HEME: SCDs, SQH    ID: Afebrile    ENDO: ISS, added Lantus 12u, and premeal 4u    DISPOSITION: Continue SICU care for hydrocephalus watch,  D/w Dr. Slaughter, Dr. Moreno

## 2018-02-11 NOTE — PROGRESS NOTE ADULT - SUBJECTIVE AND OBJECTIVE BOX
=================================  NEUROCRITICAL CARE ATTENDING NOTE  =================================    BARBARA LOMBARDO   MRN-[id number]  Summary:  68y/M with no PMH, presented with sudden onset of severe HA.  He took ASA and subsequently passed out (?).  Brought to [facility], where CT showed SAH MF4, HH2.  He was transferred to [facility] for further care. (2018 06:14)    Overnight Events: No significant events overnight; TCDs yesterday WNL; given labetalol / hydralazine for high BPs    PAST MEDICAL & SURGICAL HISTORY: NEG  NKDA  Home meds: none    PHYSICAL EXAMINATION  T(C): 36.7 ( @ 05:05), Max: 37.4 (02-10 @ 14:10) HR: 56 ( @ 07:00) (56 - 78) BP: 137/66 ( @ 07:00) (105/65 - 154/67) RR: 10 ( @ 07:00) (10 - 21) SpO2: 95% ( @ 07:00) (92% - 98%)  NEUROLOGIC EXAMINATION:  Patient is awake, alert, oriented x3, pupils 3mm equal and briskly reactive to light, EOMs intact, muscle strength 5/5 on all 4 extremities  GENERAL:  not intubated, not in cardiorespiratory distress  EENT: anicteric  CARDIOVASC:  (+) S1 S2, bradycardic and regular rhythm  PULMONARY:  clear to auscultation bilaterally  ABDOMEN:  soft, nontender, with normoactive bowel sounds  EXTREMITIES:  no edema  SKIN:  no rash    LABS:  CAPILLARY BLOOD GLUCOSE 222 261 310 210 304               12.3   11.7  )-----------( 282      ( 2018 05:46 )             36.2     133<L>  |  98  |  19  ----------------------------<  227<H>  5.2   |  24  |  0.59    Ca    8.5      2018 05:46  Phos  3.2     02-11  Mg     2.2     02-11    02-10 @ 07: @ 07:00  IN: 2900 mL / OUT: 2050 mL / NET: 850 mL    Bacteriology:  CSF studies:  EEG:  Neuroimagin/07 MRI cervical spine: NEG  Other imagin/06 Echo EF 60-65%    MEDICATIONS: fioricet cyclobenzaprine 5mg TID dexamethasone 4mg PO q6h docusate SQH q8h mod ISS nimodipine 60mg q4h oxycodone 10mg q4h PRN pantoprazole 40 daily senna 2mg HS salt 2g q8h   MEDICATIONS: atorvastatin 40mg HS cyclobenzaprine dexamethasone 4mg PO q6h docusate SQH q8h mod ISS lisinopril 10mg daily nimodipine 60 mg q4h oxycodone 10/5mg q4h PRN pantoprazole 40 daily senna 2mg HS salt 2g q6h    IV FLUIDS: NS@75cc/hr  DRIPS:  DIET: regular  Lines:  Drains:    Wounds:    CODE STATUS:  full code                       GOALS OF CARE:  aggressive                      DISPOSITION:  ICU =================================  NEUROCRITICAL CARE ATTENDING NOTE  =================================    BARBARA LOMBARDO   MRN-[id number]  Summary:  68y/M with no PMH, presented with sudden onset of severe HA.  He took ASA and subsequently passed out (?).  Brought to [facility], where CT showed SAH MF4, HH2.  He was transferred to [facility] for further care. (2018 06:14)    Overnight Events: No significant events overnight; TCDs yesterday WNL    PAST MEDICAL & SURGICAL HISTORY: NEG  NKDA  Home meds: none    PHYSICAL EXAMINATION  T(C): 36.7 ( @ 05:05), Max: 37.4 (02-10 @ 14:10) HR: 56 ( @ 07:00) (56 - 78) BP: 137/66 ( @ 07:00) (105/65 - 154/67) RR: 10 ( @ 07:00) (10 - 21) SpO2: 95% ( @ 07:00) (92% - 98%)  NEUROLOGIC EXAMINATION:  Patient is awake, alert, oriented x3, pupils 3mm equal and briskly reactive to light, EOMs intact, muscle strength 5/5 on all 4 extremities  GENERAL:  not intubated, not in cardiorespiratory distress  EENT: anicteric  CARDIOVASC:  (+) S1 S2, bradycardic and regular rhythm  PULMONARY:  clear to auscultation bilaterally  ABDOMEN:  soft, nontender, with normoactive bowel sounds  EXTREMITIES:  no edema  SKIN:  no rash    LABS:  CAPILLARY BLOOD GLUCOSE 222 261 310 210 304    (11.1)     12.3   11.7  )-----------( 282      ( 2018 05:46 )             36.2     133<L>  |  98  |  19  ----------------------------<  227<H>  5.2   |  24  |  0.59    Ca    8.5      2018 05:46  Phos  3.2       Mg     2.2     02-11    02-10 @ 07: @ 07:00  IN: 2900 mL / OUT: 2050 mL / NET: 850 mL    Bacteriology:  CSF studies:  EEG:  Neuroimagin/07 MRI cervical spine: NEG  Other imagin/06 Echo EF 60-65%    MEDICATIONS: atorvastatin 40mg HS cyclobenzaprine dexamethasone 4mg PO q6h docusate SQH q8h mod ISS lisinopril 10mg daily nimodipine 60 mg q4h oxycodone 10/5mg q4h PRN pantoprazole 40 daily senna 2mg HS salt 2g q6h    IV FLUIDS: NS@75cc/hr  DRIPS:  DIET: regular with ensure  Lines:  Drains:    Wounds:    CODE STATUS:  full code                       GOALS OF CARE:  aggressive                      DISPOSITION:  ICU

## 2018-02-12 LAB
ANION GAP SERPL CALC-SCNC: 10 MMOL/L — SIGNIFICANT CHANGE UP (ref 5–17)
ANION GAP SERPL CALC-SCNC: 13 MMOL/L — SIGNIFICANT CHANGE UP (ref 5–17)
ANION GAP SERPL CALC-SCNC: 9 MMOL/L — SIGNIFICANT CHANGE UP (ref 5–17)
APTT BLD: 30.7 SEC — SIGNIFICANT CHANGE UP (ref 27.5–37.4)
BLD GP AB SCN SERPL QL: NEGATIVE — SIGNIFICANT CHANGE UP
BUN SERPL-MCNC: 17 MG/DL — SIGNIFICANT CHANGE UP (ref 7–23)
CALCIUM SERPL-MCNC: 8.3 MG/DL — LOW (ref 8.4–10.5)
CALCIUM SERPL-MCNC: 8.3 MG/DL — LOW (ref 8.4–10.5)
CALCIUM SERPL-MCNC: 8.5 MG/DL — SIGNIFICANT CHANGE UP (ref 8.4–10.5)
CHLORIDE SERPL-SCNC: 94 MMOL/L — LOW (ref 96–108)
CHLORIDE SERPL-SCNC: 98 MMOL/L — SIGNIFICANT CHANGE UP (ref 96–108)
CHLORIDE SERPL-SCNC: 98 MMOL/L — SIGNIFICANT CHANGE UP (ref 96–108)
CO2 SERPL-SCNC: 24 MMOL/L — SIGNIFICANT CHANGE UP (ref 22–31)
CO2 SERPL-SCNC: 27 MMOL/L — SIGNIFICANT CHANGE UP (ref 22–31)
CO2 SERPL-SCNC: 27 MMOL/L — SIGNIFICANT CHANGE UP (ref 22–31)
CREAT SERPL-MCNC: 0.58 MG/DL — SIGNIFICANT CHANGE UP (ref 0.5–1.3)
CREAT SERPL-MCNC: 0.6 MG/DL — SIGNIFICANT CHANGE UP (ref 0.5–1.3)
CREAT SERPL-MCNC: 0.65 MG/DL — SIGNIFICANT CHANGE UP (ref 0.5–1.3)
GLUCOSE SERPL-MCNC: 185 MG/DL — HIGH (ref 70–99)
GLUCOSE SERPL-MCNC: 211 MG/DL — HIGH (ref 70–99)
GLUCOSE SERPL-MCNC: 221 MG/DL — HIGH (ref 70–99)
HCT VFR BLD CALC: 34.5 % — LOW (ref 39–50)
HGB BLD-MCNC: 12 G/DL — LOW (ref 13–17)
INR BLD: 1.04 — SIGNIFICANT CHANGE UP (ref 0.88–1.16)
MAGNESIUM SERPL-MCNC: 2 MG/DL — SIGNIFICANT CHANGE UP (ref 1.6–2.6)
MCHC RBC-ENTMCNC: 31.7 PG — SIGNIFICANT CHANGE UP (ref 27–34)
MCHC RBC-ENTMCNC: 34.8 G/DL — SIGNIFICANT CHANGE UP (ref 32–36)
MCV RBC AUTO: 91 FL — SIGNIFICANT CHANGE UP (ref 80–100)
PHOSPHATE SERPL-MCNC: 4.1 MG/DL — SIGNIFICANT CHANGE UP (ref 2.5–4.5)
PLATELET # BLD AUTO: 266 K/UL — SIGNIFICANT CHANGE UP (ref 150–400)
POTASSIUM SERPL-MCNC: 4.1 MMOL/L — SIGNIFICANT CHANGE UP (ref 3.5–5.3)
POTASSIUM SERPL-MCNC: 4.3 MMOL/L — SIGNIFICANT CHANGE UP (ref 3.5–5.3)
POTASSIUM SERPL-MCNC: 4.8 MMOL/L — SIGNIFICANT CHANGE UP (ref 3.5–5.3)
POTASSIUM SERPL-SCNC: 4.1 MMOL/L — SIGNIFICANT CHANGE UP (ref 3.5–5.3)
POTASSIUM SERPL-SCNC: 4.3 MMOL/L — SIGNIFICANT CHANGE UP (ref 3.5–5.3)
POTASSIUM SERPL-SCNC: 4.8 MMOL/L — SIGNIFICANT CHANGE UP (ref 3.5–5.3)
PROTHROM AB SERPL-ACNC: 11.6 SEC — SIGNIFICANT CHANGE UP (ref 9.8–12.7)
RBC # BLD: 3.79 M/UL — LOW (ref 4.2–5.8)
RBC # FLD: 12.1 % — SIGNIFICANT CHANGE UP (ref 10.3–16.9)
RH IG SCN BLD-IMP: POSITIVE — SIGNIFICANT CHANGE UP
SODIUM SERPL-SCNC: 131 MMOL/L — LOW (ref 135–145)
SODIUM SERPL-SCNC: 134 MMOL/L — LOW (ref 135–145)
SODIUM SERPL-SCNC: 135 MMOL/L — SIGNIFICANT CHANGE UP (ref 135–145)
WBC # BLD: 10.3 K/UL — SIGNIFICANT CHANGE UP (ref 3.8–10.5)
WBC # FLD AUTO: 10.3 K/UL — SIGNIFICANT CHANGE UP (ref 3.8–10.5)

## 2018-02-12 PROCEDURE — 99291 CRITICAL CARE FIRST HOUR: CPT | Mod: 24

## 2018-02-12 PROCEDURE — 76377 3D RENDER W/INTRP POSTPROCES: CPT | Mod: 26,59

## 2018-02-12 PROCEDURE — 36224 PLACE CATH CAROTD ART: CPT | Mod: 50

## 2018-02-12 PROCEDURE — 36226 PLACE CATH VERTEBRAL ART: CPT | Mod: 50

## 2018-02-12 PROCEDURE — 36227 PLACE CATH XTRNL CAROTID: CPT | Mod: 50

## 2018-02-12 RX ORDER — DEXAMETHASONE 0.5 MG/5ML
4 ELIXIR ORAL EVERY 8 HOURS
Qty: 0 | Refills: 0 | Status: DISCONTINUED | OUTPATIENT
Start: 2018-02-12 | End: 2018-02-12

## 2018-02-12 RX ORDER — DEXAMETHASONE 0.5 MG/5ML
2 ELIXIR ORAL EVERY 12 HOURS
Qty: 0 | Refills: 0 | Status: COMPLETED | OUTPATIENT
Start: 2018-02-13 | End: 2018-02-14

## 2018-02-12 RX ORDER — SODIUM CHLORIDE 9 MG/ML
1000 INJECTION INTRAMUSCULAR; INTRAVENOUS; SUBCUTANEOUS
Qty: 0 | Refills: 0 | Status: DISCONTINUED | OUTPATIENT
Start: 2018-02-12 | End: 2018-02-12

## 2018-02-12 RX ORDER — DEXAMETHASONE 0.5 MG/5ML
2 ELIXIR ORAL EVERY 8 HOURS
Qty: 0 | Refills: 0 | Status: COMPLETED | OUTPATIENT
Start: 2018-02-12 | End: 2018-02-13

## 2018-02-12 RX ORDER — DEXAMETHASONE 0.5 MG/5ML
ELIXIR ORAL
Qty: 0 | Refills: 0 | Status: COMPLETED | OUTPATIENT
Start: 2018-02-12 | End: 2018-02-14

## 2018-02-12 RX ORDER — SODIUM CHLORIDE 5 G/100ML
1000 INJECTION, SOLUTION INTRAVENOUS
Qty: 0 | Refills: 0 | Status: DISCONTINUED | OUTPATIENT
Start: 2018-02-12 | End: 2018-02-13

## 2018-02-12 RX ADMIN — CYCLOBENZAPRINE HYDROCHLORIDE 5 MILLIGRAM(S): 10 TABLET, FILM COATED ORAL at 14:36

## 2018-02-12 RX ADMIN — Medication 4 MILLIGRAM(S): at 05:44

## 2018-02-12 RX ADMIN — Medication 2 MILLIGRAM(S): at 21:22

## 2018-02-12 RX ADMIN — LISINOPRIL 10 MILLIGRAM(S): 2.5 TABLET ORAL at 05:38

## 2018-02-12 RX ADMIN — SODIUM CHLORIDE 2 GRAM(S): 9 INJECTION INTRAMUSCULAR; INTRAVENOUS; SUBCUTANEOUS at 17:44

## 2018-02-12 RX ADMIN — Medication 2 MILLIGRAM(S): at 16:31

## 2018-02-12 RX ADMIN — ATORVASTATIN CALCIUM 40 MILLIGRAM(S): 80 TABLET, FILM COATED ORAL at 21:21

## 2018-02-12 RX ADMIN — CYCLOBENZAPRINE HYDROCHLORIDE 5 MILLIGRAM(S): 10 TABLET, FILM COATED ORAL at 21:21

## 2018-02-12 RX ADMIN — SODIUM CHLORIDE 2 GRAM(S): 9 INJECTION INTRAMUSCULAR; INTRAVENOUS; SUBCUTANEOUS at 05:44

## 2018-02-12 RX ADMIN — Medication 100 MILLIGRAM(S): at 21:22

## 2018-02-12 RX ADMIN — Medication 4: at 22:39

## 2018-02-12 RX ADMIN — NIMODIPINE 60 MILLIGRAM(S): 60 SOLUTION ORAL at 21:21

## 2018-02-12 RX ADMIN — Medication 100 MILLIGRAM(S): at 14:36

## 2018-02-12 RX ADMIN — NIMODIPINE 60 MILLIGRAM(S): 60 SOLUTION ORAL at 10:04

## 2018-02-12 RX ADMIN — PANTOPRAZOLE SODIUM 40 MILLIGRAM(S): 20 TABLET, DELAYED RELEASE ORAL at 05:39

## 2018-02-12 RX ADMIN — HEPARIN SODIUM 5000 UNIT(S): 5000 INJECTION INTRAVENOUS; SUBCUTANEOUS at 14:36

## 2018-02-12 RX ADMIN — OXYCODONE HYDROCHLORIDE 5 MILLIGRAM(S): 5 TABLET ORAL at 05:44

## 2018-02-12 RX ADMIN — NIMODIPINE 60 MILLIGRAM(S): 60 SOLUTION ORAL at 14:36

## 2018-02-12 RX ADMIN — Medication 100 MILLIGRAM(S): at 05:39

## 2018-02-12 RX ADMIN — SODIUM CHLORIDE 50 MILLILITER(S): 5 INJECTION, SOLUTION INTRAVENOUS at 10:03

## 2018-02-12 RX ADMIN — CYCLOBENZAPRINE HYDROCHLORIDE 5 MILLIGRAM(S): 10 TABLET, FILM COATED ORAL at 05:50

## 2018-02-12 RX ADMIN — Medication 4 UNIT(S): at 17:44

## 2018-02-12 RX ADMIN — NIMODIPINE 60 MILLIGRAM(S): 60 SOLUTION ORAL at 01:56

## 2018-02-12 RX ADMIN — SODIUM CHLORIDE 2 GRAM(S): 9 INJECTION INTRAMUSCULAR; INTRAVENOUS; SUBCUTANEOUS at 14:36

## 2018-02-12 RX ADMIN — SODIUM CHLORIDE 2 GRAM(S): 9 INJECTION INTRAMUSCULAR; INTRAVENOUS; SUBCUTANEOUS at 23:36

## 2018-02-12 RX ADMIN — HEPARIN SODIUM 5000 UNIT(S): 5000 INJECTION INTRAVENOUS; SUBCUTANEOUS at 05:38

## 2018-02-12 RX ADMIN — NIMODIPINE 60 MILLIGRAM(S): 60 SOLUTION ORAL at 05:38

## 2018-02-12 RX ADMIN — NIMODIPINE 60 MILLIGRAM(S): 60 SOLUTION ORAL at 17:44

## 2018-02-12 RX ADMIN — SENNA PLUS 2 TABLET(S): 8.6 TABLET ORAL at 21:21

## 2018-02-12 RX ADMIN — HEPARIN SODIUM 5000 UNIT(S): 5000 INJECTION INTRAVENOUS; SUBCUTANEOUS at 21:21

## 2018-02-12 NOTE — PROGRESS NOTE ADULT - ASSESSMENT
68M with   1.  subarachnoid hemorrhage, s/p angio (02/06/18, Dr. Rodriguez); R Pcomm infundibular dilatation  2.  newly diagnosed; diabetes Mellitus    PLAN: Day 1 02/06  Day 3 02/08  Day 7 02/12  Day 14 02/19   Day 21  02/26  NEURO: neurochecks q2h, PRN pain meds with Tylenol / fioricet / oxycodone / flexeril, decrease decadron to 4 q8h  SAH:  TCD today; conventional angio on Mon, cont nimodipine 60 mg po q4h to be given for 21 days (last day 02/26)  Hydrocephalus watch, no EVD currently  Seizure prophylaxis not indicated  REHAB:  physical therapy evaluation and management    EARLY MOB:  HOB elevated, OOB to chair, ambulate    PULM:  Room air, incentive spirometry  CARDIO:  SBP goal 100-160mm Hg, lisinopril 10mg daily   ENDO:  Blood sugar goals 140-180 mg/dL, cont insulin sliding scale, A1C 8.5 lipid profile HDL 32  Total 172 ; atorvastatin 40mg daily ; lantus 12 this morning; HR 4 units premeals  GI:  docusate senna, miralax  DIET: regular diet; ensure  RENAL: keep IVF at 50cc/hr; maintain euvolemia, accurate Is and Os, cont IVF; salt tabs 2g q6h; BMP this PM  HEM/ONC: no coagulopathy (INR=1.11)  VTE Prophylaxis: SCDs, SQH  ID: afebrile, no leukocytosis  Social: will update family    Patient at high risk for neurological deterioration or death due to:    Critical care time, excluding procedures: 45 minutes. 68M with   1.  subarachnoid hemorrhage, s/p angio (02/06/18, Dr. Rodriguez); R Pcomm infundibular dilatation  2.  newly diagnosed; diabetes Mellitus    PLAN: Day 1 02/06  Day 3 02/08  Day 7 02/12  Day 14 02/19   Day 21  02/26  NEURO: neurochecks q1h, PRN pain meds with Tylenol / fioricet / oxycodone / flexeril, taper decadron to 3 days  SAH:  conventional angio on Mon, cont nimodipine 60 mg po q4h to be given for 21 days (last day 02/26)  Hydrocephalus watch, no EVD currently  Seizure prophylaxis not indicated  REHAB:  physical therapy evaluation and management    EARLY MOB:  HOB elevated, OOB to chair, ambulate    PULM:  Room air, incentive spirometry  CARDIO:  SBP goal 100-160mm Hg, lisinopril 10mg daily   ENDO:  Blood sugar goals 140-180 mg/dL, cont insulin sliding scale, A1C 8.5 lipid profile HDL 32  Total 172 ; atorvastatin 40mg daily ; hold lantus (NPO) hold premeals  GI:  docusate senna, miralax  DIET: NPO for procedure  RENAL: 2% at 50cc/hr, BMP in 4 hours; cont salt tabs 2g q6h  HEM/ONC: no coagulopathy (INR=1.11)  VTE Prophylaxis: SCDs, SQH  ID: afebrile, no leukocytosis  Social: will update family    Patient at high risk for neurological deterioration or death due to:    Critical care time, excluding procedures: 45 minutes.

## 2018-02-12 NOTE — PROGRESS NOTE ADULT - SUBJECTIVE AND OBJECTIVE BOX
=================================  NEUROCRITICAL CARE ATTENDING NOTE  =================================    BARBARA LOMBARDO   MRN-[id number]  Summary:  68y/M with no PMH, presented with sudden onset of severe HA.  He took ASA and subsequently passed out (?).  Brought to [facility], where CT showed SAH MF4, HH2.  He was transferred to [facility] for further care. (2018 06:14)    Overnight Events: No significant events overnight; TCDs yesterday WNL    PAST MEDICAL & SURGICAL HISTORY: NEG  NKDA  Home meds: none    PHYSICAL EXAMINATION  T(C): 37.1 ( @ 05:03), Max: 37.3 ( @ 14:13) HR: 52 ( @ 06:00) (52 - 68) BP: 144/66 ( @ 06:00) (134/66 - 189/79) RR: 13 ( @ 06:00) (10 - 21) SpO2: 100% ( @ 06:00) (93% - 100%)  NEUROLOGIC EXAMINATION:  Patient is awake, alert, oriented x3, pupils 3mm equal and briskly reactive to light, EOMs intact, muscle strength 5/5 on all 4 extremities  GENERAL:  not intubated, not in cardiorespiratory distress  EENT: anicteric  CARDIOVASC:  (+) S1 S2, bradycardic and regular rhythm  PULMONARY:  clear to auscultation bilaterally  ABDOMEN:  soft, nontender, with normoactive bowel sounds  EXTREMITIES:  no edema  SKIN:  no rash    LABS:  CAPILLARY BLOOD GLUCOSE 184 201 183 292                        12.0   10.3  )-----------( 266      ( 2018 05:33 )             34.5     131<L>  |  94<L>  |  17  ----------------------------<  211<H>  4.8   |  24  |  0.60    Ca    8.5      2018 05:33  Phos  4.1     02-12  Mg     2.0      @ 07: @ 07:00  IN: 2400 mL / OUT: 2900 mL / NET: -500 mL    Bacteriology:  CSF studies:  EEG:  Neuroimagin/07 MRI cervical spine: NEG  Other imagin/06 Echo EF 60-65%    MEDICATIONS: atorvastatin 40mg HS cyclobenzaprine dexamethasone 4mg q8h PO docusate TID SQH q8h mod ISS HR 4 TID lisinopril 10mg daily nimodipine 60mg q4h oxycodone 5/10mg q4h PRN pantoprazole 40 daily miralax senna HS salt 2g q6h    IV FLUIDS: NS@75cc/hr  DRIPS:  DIET: regular with ensure  Lines:  Drains:    Wounds:    CODE STATUS:  full code                       GOALS OF CARE:  aggressive                      DISPOSITION:  ICU =================================  NEUROCRITICAL CARE ATTENDING NOTE  =================================    BARBARA LOMBARDO   MRN-[id number]  Summary:  68y/M with no PMH, presented with sudden onset of severe HA.  He took ASA and subsequently passed out (?).  Brought to [facility], where CT showed SAH MF4, HH2.  He was transferred to [facility] for further care. (2018 06:14)    Overnight Events: No significant events overnight, headaches much improved, still no BM    PAST MEDICAL & SURGICAL HISTORY: NEG  NKDA  Home meds: none    PHYSICAL EXAMINATION  T(C): 37.1 ( @ 05:03), Max: 37.3 ( @ 14:13) HR: 52 ( @ 06:00) (52 - 68) BP: 144/66 ( @ 06:00) (134/66 - 189/79) RR: 13 ( @ 06:00) (10 - 21) SpO2: 100% ( @ 06:00) (93% - 100%)  NEUROLOGIC EXAMINATION:  Patient is awake, alert, oriented x3, pupils 3mm equal and briskly reactive to light, EOMs intact, muscle strength 5/5 on all 4 extremities  GENERAL:  not intubated, not in cardiorespiratory distress  EENT: anicteric  CARDIOVASC:  (+) S1 S2, bradycardic and regular rhythm  PULMONARY:  clear to auscultation bilaterally  ABDOMEN:  soft, nontender, with normoactive bowel sounds  EXTREMITIES:  no edema  SKIN:  no rash    LABS:  CAPILLARY BLOOD GLUCOSE 184 201 183 292             (11)       12.0   10.3  )-----------( 266      ( 2018 05:33 )             34.5     131<L>  |  94<L>  |  17  ----------------------------<  211<H>  4.8   |  24  |  0.60    Ca    8.5      2018 05:33  Phos  4.1     -12  Mg     2.0      @ 07: @ 07:00  IN: 2400 mL / OUT: 2900 mL / NET: -500 mL    Bacteriology:  CSF studies:  EEG:  Neuroimagin/07 MRI cervical spine: NEG  Other imagin/06 Echo EF 60-65%    MEDICATIONS: atorvastatin 40mg HS cyclobenzaprine dexamethasone 4mg q8h PO docusate TID SQH q8h mod ISS HR 4 TID lisinopril 10mg daily nimodipine 60mg q4h oxycodone 5/10mg q4h PRN pantoprazole 40 daily miralax senna HS salt 2g q6h    IV FLUIDS: 2%@50cc/hr  DRIPS:  DIET: NPO, was on regular with ensure  Lines:  Drains:    Wounds:    CODE STATUS:  full code                       GOALS OF CARE:  aggressive                      DISPOSITION:  ICU

## 2018-02-12 NOTE — PROGRESS NOTE ADULT - SUBJECTIVE AND OBJECTIVE BOX
NEUROSURGERY POST OP NOTE:    POD# 0 S/P Cerebral angiogram    S:   67 y/o M s/p follow-up diagnostic cerebral angiogram; first done 2/6/18. Pt with non-traumatic SAH. Pt seen and evaluated at bedside; pt denies increased HA, dizziness, weakness of extremities, pain at R groin catheter insertion site, visual changes.       T(C): 36.6 (02-12-18 @ 09:04), Max: 37.1 (02-12-18 @ 05:03)  HR: 64 (02-12-18 @ 16:00) (52 - 73)  BP: 153/80 (02-12-18 @ 16:00) (109/46 - 189/79)  RR: 18 (02-12-18 @ 16:00) (10 - 18)  SpO2: 81% (02-12-18 @ 16:00) (81% - 100%)      02-11-18 @ 07:01  -  02-12-18 @ 07:00  --------------------------------------------------------  IN: 2400 mL / OUT: 2900 mL / NET: -500 mL    02-12-18 @ 07:01  -  02-12-18 @ 16:24  --------------------------------------------------------  IN: 375 mL / OUT: 900 mL / NET: -525 mL        acetaminophen 300 mG/butalbital 50 mG/ caffeine 40 mG 1 Capsule(s) Oral every 6 hours PRN  atorvastatin 40 milliGRAM(s) Oral at bedtime  cyclobenzaprine 5 milliGRAM(s) Oral three times a day  dexamethasone     Tablet 2 milliGRAM(s) Oral every 8 hours  dexamethasone     Tablet   Oral   docusate sodium 100 milliGRAM(s) Oral three times a day  heparin  Injectable 5000 Unit(s) SubCutaneous every 8 hours  hydrALAZINE Injectable 10 milliGRAM(s) IV Push every 6 hours PRN  influenza   Vaccine 0.5 milliLiter(s) IntraMuscular once  insulin lispro (HumaLOG) corrective regimen sliding scale   SubCutaneous every 6 hours  insulin lispro Injectable (HumaLOG) 4 Unit(s) SubCutaneous three times a day before meals  labetalol Injectable 10 milliGRAM(s) IV Push every 6 hours PRN  lisinopril 10 milliGRAM(s) Oral daily  magnesium hydroxide Suspension 30 milliLiter(s) Oral daily PRN  metoclopramide Injectable 5 milliGRAM(s) IV Push every 6 hours PRN  niMODipine 60 milliGRAM(s) Oral every 4 hours  ondansetron Injectable 4 milliGRAM(s) IV Push every 6 hours PRN  oxyCODONE    IR 10 milliGRAM(s) Oral every 4 hours PRN  oxyCODONE    IR 5 milliGRAM(s) Oral every 4 hours PRN  pantoprazole    Tablet 40 milliGRAM(s) Oral before breakfast  polyethylene glycol 3350 17 Gram(s) Oral daily  senna 2 Tablet(s) Oral at bedtime  sodium chloride 2 Gram(s) Oral every 6 hours  sodium chloride 2% . 1000 milliLiter(s) IV Continuous <Continuous>      RADIOLOGY:     Exam:  General: NAD. AAOx3.  HEENT: NC/AT. PERRL, EOM's intact. Poor dentition. VF grossly intact.  Cardio: S1 and S2 clear, RRR, no murmurs, gallops or rubs   Pulm: CTAB, no wheezes, rales or rhonchi  Abdomen: Soft, nontender, nondistended.  No masses or organomegaly. +BS   Skin: Warm and dry, no rashes or lesions. Site of right femoral catheter insertion clean, no hematoma, erythema or swelling.   Neuro: CNII-XII  intact. Strength 5/5 UE & LE. No pronator drift. Sensation intact bilat.  Extremities: Distal pulses intact, DP, PT 2+ symmetric      WOUND/DRAINS: N/A       Assessment: 68yMale      Plan:  -Neuro checks q1h  -R groin catheter site checks   -Vascular checks   -Continue vasospasm watch, TCD's NEUROSURGERY POST OP NOTE:    POD# 0 S/P Cerebral angiogram    S:   67 y/o M s/p follow-up diagnostic cerebral angiogram; first done 2/6/18. Pt with non-traumatic SAH. Pt seen and evaluated at bedside; pt denies increased HA, dizziness, weakness of extremities, pain at R groin catheter insertion site, visual changes.       T(C): 36.6 (02-12-18 @ 09:04), Max: 37.1 (02-12-18 @ 05:03)  HR: 64 (02-12-18 @ 16:00) (52 - 73)  BP: 153/80 (02-12-18 @ 16:00) (109/46 - 189/79)  RR: 18 (02-12-18 @ 16:00) (10 - 18)  SpO2: 81% (02-12-18 @ 16:00) (81% - 100%)      02-11-18 @ 07:01  -  02-12-18 @ 07:00  --------------------------------------------------------  IN: 2400 mL / OUT: 2900 mL / NET: -500 mL    02-12-18 @ 07:01  -  02-12-18 @ 16:24  --------------------------------------------------------  IN: 375 mL / OUT: 900 mL / NET: -525 mL        acetaminophen 300 mG/butalbital 50 mG/ caffeine 40 mG 1 Capsule(s) Oral every 6 hours PRN  atorvastatin 40 milliGRAM(s) Oral at bedtime  cyclobenzaprine 5 milliGRAM(s) Oral three times a day  dexamethasone     Tablet 2 milliGRAM(s) Oral every 8 hours  dexamethasone     Tablet   Oral   docusate sodium 100 milliGRAM(s) Oral three times a day  heparin  Injectable 5000 Unit(s) SubCutaneous every 8 hours  hydrALAZINE Injectable 10 milliGRAM(s) IV Push every 6 hours PRN  influenza   Vaccine 0.5 milliLiter(s) IntraMuscular once  insulin lispro (HumaLOG) corrective regimen sliding scale   SubCutaneous every 6 hours  insulin lispro Injectable (HumaLOG) 4 Unit(s) SubCutaneous three times a day before meals  labetalol Injectable 10 milliGRAM(s) IV Push every 6 hours PRN  lisinopril 10 milliGRAM(s) Oral daily  magnesium hydroxide Suspension 30 milliLiter(s) Oral daily PRN  metoclopramide Injectable 5 milliGRAM(s) IV Push every 6 hours PRN  niMODipine 60 milliGRAM(s) Oral every 4 hours  ondansetron Injectable 4 milliGRAM(s) IV Push every 6 hours PRN  oxyCODONE    IR 10 milliGRAM(s) Oral every 4 hours PRN  oxyCODONE    IR 5 milliGRAM(s) Oral every 4 hours PRN  pantoprazole    Tablet 40 milliGRAM(s) Oral before breakfast  polyethylene glycol 3350 17 Gram(s) Oral daily  senna 2 Tablet(s) Oral at bedtime  sodium chloride 2 Gram(s) Oral every 6 hours  sodium chloride 2% . 1000 milliLiter(s) IV Continuous <Continuous>      RADIOLOGY:     Exam:  General: NAD. AAOx3.  HEENT: NC/AT. PERRL, EOM's intact. Poor dentition. VF grossly intact.  Cardio: S1 and S2 clear, RRR, no murmurs, gallops or rubs   Pulm: CTAB, no wheezes, rales or rhonchi  Abdomen: Soft, nontender, nondistended.  No masses or organomegaly. +BS   Skin: Warm and dry, no rashes or lesions. Site of right femoral catheter insertion clean w/ dressing, C/D/I. No hematoma.  Neuro: CN II-XII  intact. Strength 5/5 UE & LE. No pronator drift. Sensation intact bilat.  Extremities: Distal pulses intact, DP, PT 2+ symmetric      WOUND/DRAINS: N/A       Assessment: 68y Male s/p repeat diagnostic cerebral angiogram POD#0      Plan:  -Neuro and groin/vascular checks q1h,  -Bedrest x4 hours  -Continue vasospasm watch, TCD's PRN  -Pain meds PRN,  -D/w Dr. Slaughter

## 2018-02-12 NOTE — BRIEF OPERATIVE NOTE - PROCEDURE
<<-----Click on this checkbox to enter Procedure Cerebral angiography  02/12/2018    Active  GRESTREP

## 2018-02-12 NOTE — PROGRESS NOTE ADULT - SUBJECTIVE AND OBJECTIVE BOX
HPI: 66yo male presented to [facility] after sudden onset of severe HA at 10pm followed by collapsing. PT drank beer and took aspirin for the HA and then passed out- pt states his knees gave out but he did not lose conciousness. +photophobia +nuchal rigidity. No N/V, no fever/chills. No head trauma. CTH done in [facility] showed SAH HH2 [place] 4. Pt was transferred to [facility] for further care. (06 Feb 2018 06:14)     S/Overnight events:    No overnight events. Pt reports headache improved from previous. Glucose remains high, sodium remains somewhat low despite salt tabs. Pt reports still no BM since admission.     Hospital Course:   POD#   2/7: Reimaging showing decreased blood. Pt continuing to complain of headache, predominantly occipital. Nicardipine d/c'ed, BP meds PRN for parameter: systolic >160. Denies fever, chills, chest pain, SOB, nausea, vomiting, LE swelling or pain.   2/8: Headache improved from yesterday; pt still states is severe. No BM since before admission as well as significantly diminished appetite. Pt more lethargic than yesterday but continues to be arousable and oriented.   2/9: Pt continues to endorse severe headache despite pain control measures. Very low appetite continues, and fingersticks have been low as a result (lantus was not given last night). Still no BM since admission. Plan for TCD today.   2/10 - Continues to complain of headaches and poor appetite. Persistent hypertension. Clinically stable  2/11 - No significant events; glucose remains high.  2/12 -  Pt reports headache improved from previous. Glucose remains high. Pt reports still no BM since admission.         Vital Signs Last 24 Hrs  T(C): 36.6 (12 Feb 2018 09:04), Max: 37.3 (11 Feb 2018 14:13)  T(F): 97.8 (12 Feb 2018 09:04), Max: 99.1 (11 Feb 2018 14:13)  HR: 54 (12 Feb 2018 10:00) (52 - 66)  BP: 154/71 (12 Feb 2018 10:00) (109/46 - 189/79)  BP(mean): 109 (12 Feb 2018 10:00) (66 - 139)  RR: 12 (12 Feb 2018 10:00) (10 - 21)  SpO2: 97% (12 Feb 2018 10:00) (93% - 100%)    I&O's Detail    11 Feb 2018 07:01  -  12 Feb 2018 07:00  --------------------------------------------------------  IN:    Oral Fluid: 1100 mL    sodium chloride 0.9%: 750 mL    sodium chloride 0.9%: 150 mL    sodium chloride 0.9%: 400 mL  Total IN: 2400 mL    OUT:    Voided: 2900 mL  Total OUT: 2900 mL    Total NET: -500 mL      12 Feb 2018 07:01  -  12 Feb 2018 11:24  --------------------------------------------------------  IN:    sodium chloride 0.9%: 75 mL    sodium chloride 2% .: 50 mL  Total IN: 125 mL    OUT:    Voided: 900 mL  Total OUT: 900 mL    Total NET: -775 mL        I&O's Summary    11 Feb 2018 07:01  -  12 Feb 2018 07:00  --------------------------------------------------------  IN: 2400 mL / OUT: 2900 mL / NET: -500 mL    12 Feb 2018 07:01  -  12 Feb 2018 11:24  --------------------------------------------------------  IN: 125 mL / OUT: 900 mL / NET: -775 mL        PHYSICAL EXAM:  General: Male pt in NAD. Alert, conversant, calm, cooperative.   HEENT: NC/AT. PERRL, EOM's intact. Poor dentition. MMM.   Cardio: S1 and S2 clear, RRR, no murmurs, gallops or rubs   Pulm: CTAB, no wheezes, rales or rhonchi  Abdomen: Soft, nontender, nondistended.  No masses or organomegaly. +BS   Skin: Warm and dry, no rashes or lesions. Site of femoral catheter insertion clean, no hematoma, erythema or swelling.   Neuro: AAOx3. CNII-XII grossly intact. Face symmetrical. Strength 5/5 UE & LE. No UE or LE drift. Sensation intact bilat.  Extremities: Distal pulses intact, DP, PT 3+ symmetric    Incision/Wound: Femoral IV catheter site clean and without hematoma, erythema, warmth, draining or swelling.      DEVICE/DRAIN DRESSING: N/A     TUBES/LINES:  [] CVC  [] A-line  [] Lumbar Drain  [] Ventriculostomy  [] Other    DIET:  [] NPO  [x] Mechanical - regular   [] Tube feeds    LABS:                        12.0   10.3  )-----------( 266      ( 12 Feb 2018 05:33 )             34.5     02-12    131<L>  |  94<L>  |  17  ----------------------------<  211<H>  4.8   |  24  |  0.60    Ca    8.5      12 Feb 2018 05:33  Phos  4.1     02-12  Mg     2.0     02-12      PT/INR - ( 12 Feb 2018 05:33 )   PT: 11.6 sec;   INR: 1.04          PTT - ( 12 Feb 2018 05:33 )  PTT:30.7 sec        CAPILLARY BLOOD GLUCOSE      POCT Blood Glucose.: 203 mg/dL (12 Feb 2018 11:05)  POCT Blood Glucose.: 184 mg/dL (12 Feb 2018 06:01)  POCT Blood Glucose.: 201 mg/dL (11 Feb 2018 21:36)  POCT Blood Glucose.: 183 mg/dL (11 Feb 2018 16:13)      Drug Levels: [] N/A    CSF Analysis: [] N/A      Allergies  No Known Allergies    Intolerances      MEDICATIONS:  Antibiotics:    Neuro:  acetaminophen 300 mG/butalbital 50 mG/ caffeine 40 mG 1 Capsule(s) Oral every 6 hours PRN  cyclobenzaprine 5 milliGRAM(s) Oral three times a day  metoclopramide Injectable 5 milliGRAM(s) IV Push every 6 hours PRN  ondansetron Injectable 4 milliGRAM(s) IV Push every 6 hours PRN  oxyCODONE    IR 10 milliGRAM(s) Oral every 4 hours PRN  oxyCODONE    IR 5 milliGRAM(s) Oral every 4 hours PRN    Anticoagulation:  heparin  Injectable 5000 Unit(s) SubCutaneous every 8 hours    OTHER:  atorvastatin 40 milliGRAM(s) Oral at bedtime  dexamethasone     Tablet 2 milliGRAM(s) Oral every 8 hours  dexamethasone     Tablet   Oral   docusate sodium 100 milliGRAM(s) Oral three times a day  hydrALAZINE Injectable 10 milliGRAM(s) IV Push every 6 hours PRN  influenza   Vaccine 0.5 milliLiter(s) IntraMuscular once  insulin lispro (HumaLOG) corrective regimen sliding scale   SubCutaneous every 6 hours  insulin lispro Injectable (HumaLOG) 4 Unit(s) SubCutaneous three times a day before meals  labetalol Injectable 10 milliGRAM(s) IV Push every 6 hours PRN  lisinopril 10 milliGRAM(s) Oral daily  magnesium hydroxide Suspension 30 milliLiter(s) Oral daily PRN  niMODipine 60 milliGRAM(s) Oral every 4 hours  pantoprazole    Tablet 40 milliGRAM(s) Oral before breakfast  polyethylene glycol 3350 17 Gram(s) Oral daily  senna 2 Tablet(s) Oral at bedtime    IVF:  sodium chloride 2 Gram(s) Oral every 6 hours  sodium chloride 2% . 1000 milliLiter(s) IV Continuous <Continuous>    CULTURES:    RADIOLOGY & ADDITIONAL TESTS:      ASSESSMENT:  68y Male s/p cerebral angio POD#6 with SAH PBD#7, neuro intact.       PLAN:  NEURO:  -Angio planned for today   -Vasospasm watch, TCD's   -Pain management: Receiving fiorocet, oxycodone  -Neuro checks q1h   -Continue Keppra  -Decadron taper    CARDIOVASCULAR:  -Continue nimodipine  -Continue lisinopril    PULMONARY:  -O2 sats good on RA     RENAL:  -2% NS     GI:  -No BM since admission. Continue bowel regimen (miralax, senna, docusate)     HEME:  -CBC stable     ID:  -Afebrile, no issues     ENDO:  -Continue salt tabs   -Glucose remains high, continue lantus (except this am as pt NPO for procedure).     DVT PROPHYLAXIS:  -SQH and SCD's       DISPOSITION:  Full code  PT/OT: recommended 3-5 sessions weekly   -D/w Dr. Slaughter, Dr. Moreno and SICU team HPI: 66yo male presented to [facility] after sudden onset of severe HA at 10pm followed by collapsing. PT drank beer and took aspirin for the HA and then passed out- pt states his knees gave out but he did not lose conciousness. +photophobia +nuchal rigidity. No N/V, no fever/chills. No head trauma. CTH done in [facility] showed SAH HH2 [place] 4. Pt was transferred to [facility] for further care. (06 Feb 2018 06:14)     S/Overnight events:    No overnight events. Pt reports headache improved from previous day. Glucose remains high, sodium remains somewhat low despite salt tabs. Pt reports still no BM since admission.     Hospital Course:   POD#   2/7: Reimaging showing decreased blood. Pt continuing to complain of headache, predominantly occipital. Nicardipine d/c'ed, BP meds PRN for parameter: systolic >160. Denies fever, chills, chest pain, SOB, nausea, vomiting, LE swelling or pain.   2/8: Headache improved from yesterday; pt still states is severe. No BM since before admission as well as significantly diminished appetite. Pt more lethargic than yesterday but continues to be arousable and oriented.   2/9: Pt continues to endorse severe headache despite pain control measures. Very low appetite continues, and fingersticks have been low as a result (lantus was not given last night). Still no BM since admission. Plan for TCD today.   2/10 - Continues to complain of headaches and poor appetite. Persistent hypertension. Clinically stable  2/11 - No significant events; glucose remains high.  2/12 -  Pt reports headache improved from previous. Glucose remains high. Pt reports still no BM since admission. Cerebral angiogram today, no vascular abnormalities.        Vital Signs Last 24 Hrs  T(C): 36.6 (12 Feb 2018 09:04), Max: 37.3 (11 Feb 2018 14:13)  T(F): 97.8 (12 Feb 2018 09:04), Max: 99.1 (11 Feb 2018 14:13)  HR: 54 (12 Feb 2018 10:00) (52 - 66)  BP: 154/71 (12 Feb 2018 10:00) (109/46 - 189/79)  BP(mean): 109 (12 Feb 2018 10:00) (66 - 139)  RR: 12 (12 Feb 2018 10:00) (10 - 21)  SpO2: 97% (12 Feb 2018 10:00) (93% - 100%)    I&O's Detail    11 Feb 2018 07:01  -  12 Feb 2018 07:00  --------------------------------------------------------  IN:    Oral Fluid: 1100 mL    sodium chloride 0.9%: 750 mL    sodium chloride 0.9%: 150 mL    sodium chloride 0.9%: 400 mL  Total IN: 2400 mL    OUT:    Voided: 2900 mL  Total OUT: 2900 mL    Total NET: -500 mL      12 Feb 2018 07:01  -  12 Feb 2018 11:24  --------------------------------------------------------  IN:    sodium chloride 0.9%: 75 mL    sodium chloride 2% .: 50 mL  Total IN: 125 mL    OUT:    Voided: 900 mL  Total OUT: 900 mL    Total NET: -775 mL        I&O's Summary    11 Feb 2018 07:01  -  12 Feb 2018 07:00  --------------------------------------------------------  IN: 2400 mL / OUT: 2900 mL / NET: -500 mL    12 Feb 2018 07:01  -  12 Feb 2018 11:24  --------------------------------------------------------  IN: 125 mL / OUT: 900 mL / NET: -775 mL        PHYSICAL EXAM:  General: NAD, conversant, calm, cooperative. AAOx3.  HEENT: NC/AT. PERRL, EOM's intact. Poor dentition. VF grossly intact.  Cardio: S1 and S2 clear, RRR, no murmurs, gallops or rubs   Pulm: CTAB, no wheezes, rales or rhonchi  Abdomen: Soft, nontender, nondistended.  No masses or organomegaly. +BS   Skin: Warm and dry, no rashes or lesions. Site of right femoral catheter insertion clean, no hematoma, erythema or swelling.   Neuro: CNII-XII  intact. Strength 5/5 UE & LE. No pronator drift. Sensation intact bilat.  Extremities: Distal pulses intact, DP, PT 2+ symmetric      TUBES/LINES:  [] CVC  [] A-line  [] Lumbar Drain  [] Ventriculostomy  [] Other    DIET:  [x] NPO  [] Mechanical - regular   [] Tube feeds    LABS:                        12.0   10.3  )-----------( 266      ( 12 Feb 2018 05:33 )             34.5     02-12    131<L>  |  94<L>  |  17  ----------------------------<  211<H>  4.8   |  24  |  0.60    Ca    8.5      12 Feb 2018 05:33  Phos  4.1     02-12  Mg     2.0     02-12      PT/INR - ( 12 Feb 2018 05:33 )   PT: 11.6 sec;   INR: 1.04          PTT - ( 12 Feb 2018 05:33 )  PTT:30.7 sec        CAPILLARY BLOOD GLUCOSE      POCT Blood Glucose.: 203 mg/dL (12 Feb 2018 11:05)  POCT Blood Glucose.: 184 mg/dL (12 Feb 2018 06:01)  POCT Blood Glucose.: 201 mg/dL (11 Feb 2018 21:36)  POCT Blood Glucose.: 183 mg/dL (11 Feb 2018 16:13)      Drug Levels: [] N/A    CSF Analysis: [] N/A      Allergies  No Known Allergies    Intolerances      MEDICATIONS:  Antibiotics:    Neuro:  acetaminophen 300 mG/butalbital 50 mG/ caffeine 40 mG 1 Capsule(s) Oral every 6 hours PRN  cyclobenzaprine 5 milliGRAM(s) Oral three times a day  metoclopramide Injectable 5 milliGRAM(s) IV Push every 6 hours PRN  ondansetron Injectable 4 milliGRAM(s) IV Push every 6 hours PRN  oxyCODONE    IR 10 milliGRAM(s) Oral every 4 hours PRN  oxyCODONE    IR 5 milliGRAM(s) Oral every 4 hours PRN    Anticoagulation:  heparin  Injectable 5000 Unit(s) SubCutaneous every 8 hours    OTHER:  atorvastatin 40 milliGRAM(s) Oral at bedtime  dexamethasone     Tablet 2 milliGRAM(s) Oral every 8 hours  dexamethasone     Tablet   Oral   docusate sodium 100 milliGRAM(s) Oral three times a day  hydrALAZINE Injectable 10 milliGRAM(s) IV Push every 6 hours PRN  influenza   Vaccine 0.5 milliLiter(s) IntraMuscular once  insulin lispro (HumaLOG) corrective regimen sliding scale   SubCutaneous every 6 hours  insulin lispro Injectable (HumaLOG) 4 Unit(s) SubCutaneous three times a day before meals  labetalol Injectable 10 milliGRAM(s) IV Push every 6 hours PRN  lisinopril 10 milliGRAM(s) Oral daily  magnesium hydroxide Suspension 30 milliLiter(s) Oral daily PRN  niMODipine 60 milliGRAM(s) Oral every 4 hours  pantoprazole    Tablet 40 milliGRAM(s) Oral before breakfast  polyethylene glycol 3350 17 Gram(s) Oral daily  senna 2 Tablet(s) Oral at bedtime    IVF:  sodium chloride 2 Gram(s) Oral every 6 hours  sodium chloride 2% . 1000 milliLiter(s) IV Continuous <Continuous>    CULTURES:    RADIOLOGY & ADDITIONAL TESTS:      ASSESSMENT:  68y Male s/p cerebral angio POD#6 with SAH PBD#7, neuro intact.       PLAN:  NEURO:  -Cerebral Angio planned for today   -Vasospasm watch, TCD's PRN  -Pain management: Receiving fiorocet, oxycodone  -Neuro checks q1h   -Continue Keppra  -Decadron taper x3 days    CARDIOVASCULAR:  -Continue nimodipine  -Continue lisinopril  -Normotensive BP goals    PULMONARY:  -O2 sats good on RA     RENAL:  -2% NS @50cc    GI:  -No BM since admission. Continue bowel regimen (miralax, senna, docusate)     HEME:  -CBC stable     ID:  -Afebrile, no issues     ENDO:  -Continue salt tabs   -Glucose remains high, continue lantus (except this am as pt NPO for procedure).     DVT PROPHYLAXIS:  -SQH and SCD's       DISPOSITION:  Full code  PT/OT: recommended 3-5 sessions weekly   -D/w Dr. Slaughter, Dr. Moreno and SICU team

## 2018-02-12 NOTE — BRIEF OPERATIVE NOTE - OPERATION/FINDINGS
no aneurysm or AVM detected. 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
Under MAC sedation, using a 5 fr sheath right CFA, cerebral angiogram was performed.  Findings: No aneurysm, AVM, or early venous shunting. No vasospasm. 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  Full report to follow.  Above d/w DR. Rodriguez  Patient tolerated procedure well, no new neurological deficit, hemodynamically stable.  Right groin/vasc access site: Direct manual compression applied, hemostasis achieved, no hematoma.  Patient was transferred to NSICU

## 2018-02-13 LAB
ANION GAP SERPL CALC-SCNC: 7 MMOL/L — SIGNIFICANT CHANGE UP (ref 5–17)
BUN SERPL-MCNC: 17 MG/DL — SIGNIFICANT CHANGE UP (ref 7–23)
CALCIUM SERPL-MCNC: 8.3 MG/DL — LOW (ref 8.4–10.5)
CHLORIDE SERPL-SCNC: 98 MMOL/L — SIGNIFICANT CHANGE UP (ref 96–108)
CO2 SERPL-SCNC: 28 MMOL/L — SIGNIFICANT CHANGE UP (ref 22–31)
CREAT SERPL-MCNC: 0.63 MG/DL — SIGNIFICANT CHANGE UP (ref 0.5–1.3)
GLUCOSE SERPL-MCNC: 152 MG/DL — HIGH (ref 70–99)
HCT VFR BLD CALC: 33.5 % — LOW (ref 39–50)
HGB BLD-MCNC: 11.5 G/DL — LOW (ref 13–17)
MAGNESIUM SERPL-MCNC: 2 MG/DL — SIGNIFICANT CHANGE UP (ref 1.6–2.6)
MCHC RBC-ENTMCNC: 30.9 PG — SIGNIFICANT CHANGE UP (ref 27–34)
MCHC RBC-ENTMCNC: 34.3 G/DL — SIGNIFICANT CHANGE UP (ref 32–36)
MCV RBC AUTO: 90.1 FL — SIGNIFICANT CHANGE UP (ref 80–100)
PHOSPHATE SERPL-MCNC: 3.7 MG/DL — SIGNIFICANT CHANGE UP (ref 2.5–4.5)
PLATELET # BLD AUTO: 250 K/UL — SIGNIFICANT CHANGE UP (ref 150–400)
POTASSIUM SERPL-MCNC: 4.1 MMOL/L — SIGNIFICANT CHANGE UP (ref 3.5–5.3)
POTASSIUM SERPL-SCNC: 4.1 MMOL/L — SIGNIFICANT CHANGE UP (ref 3.5–5.3)
RBC # BLD: 3.72 M/UL — LOW (ref 4.2–5.8)
RBC # FLD: 12 % — SIGNIFICANT CHANGE UP (ref 10.3–16.9)
SODIUM SERPL-SCNC: 133 MMOL/L — LOW (ref 135–145)
WBC # BLD: 8.9 K/UL — SIGNIFICANT CHANGE UP (ref 3.8–10.5)
WBC # FLD AUTO: 8.9 K/UL — SIGNIFICANT CHANGE UP (ref 3.8–10.5)

## 2018-02-13 RX ADMIN — NIMODIPINE 60 MILLIGRAM(S): 60 SOLUTION ORAL at 09:49

## 2018-02-13 RX ADMIN — Medication 2 MILLIGRAM(S): at 17:53

## 2018-02-13 RX ADMIN — Medication 2 MILLIGRAM(S): at 07:41

## 2018-02-13 RX ADMIN — Medication 4: at 17:48

## 2018-02-13 RX ADMIN — HEPARIN SODIUM 5000 UNIT(S): 5000 INJECTION INTRAVENOUS; SUBCUTANEOUS at 22:33

## 2018-02-13 RX ADMIN — ATORVASTATIN CALCIUM 40 MILLIGRAM(S): 80 TABLET, FILM COATED ORAL at 22:33

## 2018-02-13 RX ADMIN — HEPARIN SODIUM 5000 UNIT(S): 5000 INJECTION INTRAVENOUS; SUBCUTANEOUS at 06:09

## 2018-02-13 RX ADMIN — Medication 100 MILLIGRAM(S): at 15:14

## 2018-02-13 RX ADMIN — OXYCODONE HYDROCHLORIDE 5 MILLIGRAM(S): 5 TABLET ORAL at 08:20

## 2018-02-13 RX ADMIN — OXYCODONE HYDROCHLORIDE 5 MILLIGRAM(S): 5 TABLET ORAL at 07:45

## 2018-02-13 RX ADMIN — SODIUM CHLORIDE 2 GRAM(S): 9 INJECTION INTRAMUSCULAR; INTRAVENOUS; SUBCUTANEOUS at 17:47

## 2018-02-13 RX ADMIN — Medication 100 MILLIGRAM(S): at 06:09

## 2018-02-13 RX ADMIN — CYCLOBENZAPRINE HYDROCHLORIDE 5 MILLIGRAM(S): 10 TABLET, FILM COATED ORAL at 06:10

## 2018-02-13 RX ADMIN — SODIUM CHLORIDE 50 MILLILITER(S): 5 INJECTION, SOLUTION INTRAVENOUS at 02:44

## 2018-02-13 RX ADMIN — OXYCODONE HYDROCHLORIDE 5 MILLIGRAM(S): 5 TABLET ORAL at 20:28

## 2018-02-13 RX ADMIN — SODIUM CHLORIDE 2 GRAM(S): 9 INJECTION INTRAMUSCULAR; INTRAVENOUS; SUBCUTANEOUS at 11:20

## 2018-02-13 RX ADMIN — Medication 4: at 11:14

## 2018-02-13 RX ADMIN — Medication 4 UNIT(S): at 07:25

## 2018-02-13 RX ADMIN — Medication 4 UNIT(S): at 11:13

## 2018-02-13 RX ADMIN — SODIUM CHLORIDE 2 GRAM(S): 9 INJECTION INTRAMUSCULAR; INTRAVENOUS; SUBCUTANEOUS at 06:10

## 2018-02-13 RX ADMIN — OXYCODONE HYDROCHLORIDE 5 MILLIGRAM(S): 5 TABLET ORAL at 19:50

## 2018-02-13 RX ADMIN — Medication 1 CAPSULE(S): at 05:00

## 2018-02-13 RX ADMIN — HEPARIN SODIUM 5000 UNIT(S): 5000 INJECTION INTRAVENOUS; SUBCUTANEOUS at 15:00

## 2018-02-13 RX ADMIN — NIMODIPINE 60 MILLIGRAM(S): 60 SOLUTION ORAL at 06:10

## 2018-02-13 RX ADMIN — Medication 1 CAPSULE(S): at 04:33

## 2018-02-13 RX ADMIN — LISINOPRIL 10 MILLIGRAM(S): 2.5 TABLET ORAL at 06:09

## 2018-02-13 RX ADMIN — Medication 4 UNIT(S): at 17:48

## 2018-02-13 RX ADMIN — Medication 100 MILLIGRAM(S): at 22:33

## 2018-02-13 RX ADMIN — NIMODIPINE 60 MILLIGRAM(S): 60 SOLUTION ORAL at 01:29

## 2018-02-13 RX ADMIN — PANTOPRAZOLE SODIUM 40 MILLIGRAM(S): 20 TABLET, DELAYED RELEASE ORAL at 06:09

## 2018-02-13 NOTE — CHART NOTE - NSCHARTNOTEFT_GEN_A_CORE
Admitting Diagnosis:   67yoM with no PMH transferred from [place] for management of spontaneous SAH    PAST MEDICAL & SURGICAL HISTORY:  No pertinent past medical history  S/P appendectomy    Current Nutrition Order: Consistent CHO with snack + glucerna shake TID (660 kcal & 30 g protein).     PO Intake: Good (%) [   ]  Fair (50-75%) [ X  ] Poor (<25%) [   ]    GI Issues: Last BM 2/5 - pt reports lack of BM    Pain: Headache - RN aware    Skin Integrity: Surgical incision    Labs:   02-13    133<L>  |  98  |  17  ----------------------------<  152<H>  4.1   |  28  |  0.63    Ca    8.3<L>      13 Feb 2018 05:58  Phos  3.7     02-13  Mg     2.0     02-13    POCT Blood Glucose.: 139 mg/dL (13 Feb 2018 06:11)  POCT Blood Glucose.: 208 mg/dL (12 Feb 2018 22:25)  POCT Blood Glucose.: 150 mg/dL (12 Feb 2018 17:16)  POCT Blood Glucose.: 203 mg/dL (12 Feb 2018 11:05)    Medications:  MEDICATIONS  (STANDING):  atorvastatin 40 milliGRAM(s) Oral at bedtime  cyclobenzaprine 5 milliGRAM(s) Oral three times a day  dexamethasone     Tablet 2 milliGRAM(s) Oral every 12 hours  dexamethasone     Tablet   Oral   docusate sodium 100 milliGRAM(s) Oral three times a day  heparin  Injectable 5000 Unit(s) SubCutaneous every 8 hours  influenza   Vaccine 0.5 milliLiter(s) IntraMuscular once  insulin lispro (HumaLOG) corrective regimen sliding scale   SubCutaneous every 6 hours  insulin lispro Injectable (HumaLOG) 4 Unit(s) SubCutaneous three times a day before meals  lisinopril 10 milliGRAM(s) Oral daily  niMODipine 60 milliGRAM(s) Oral every 4 hours  pantoprazole    Tablet 40 milliGRAM(s) Oral before breakfast  polyethylene glycol 3350 17 Gram(s) Oral daily  senna 2 Tablet(s) Oral at bedtime  sodium chloride 2 Gram(s) Oral every 6 hours    MEDICATIONS  (PRN):  acetaminophen 300 mG/butalbital 50 mG/ caffeine 40 mG 1 Capsule(s) Oral every 6 hours PRN headache  hydrALAZINE Injectable 10 milliGRAM(s) IV Push every 6 hours PRN PRN SBP > 160  labetalol Injectable 10 milliGRAM(s) IV Push every 6 hours PRN Systolic blood pressure > 160  magnesium hydroxide Suspension 30 milliLiter(s) Oral daily PRN Constipation  metoclopramide Injectable 5 milliGRAM(s) IV Push every 6 hours PRN nausea, vomiting, headaches  ondansetron Injectable 4 milliGRAM(s) IV Push every 6 hours PRN Nausea and/or Vomiting  oxyCODONE    IR 10 milliGRAM(s) Oral every 4 hours PRN Severe Pain (7 - 10)  oxyCODONE    IR 5 milliGRAM(s) Oral every 4 hours PRN Moderate Pain (4 - 6)    Weight:  No new weights to assess    Estimated energy needs using 70 kg ABW:  ABW used 2/2 pt between % of IBW.   needs 2/2 advanced age + post op.  20-25 kcal/kg (7245-2382 kcal).   1.2-1.4 g/kg (84-98 g protein).   30-35 mL/kg (3815-4926 mL fluid).     Subjective: Pt reports not receiving breakfast this AM, discussed with host & RN. Reports feeling hungry. Drinking glucerna shakes. States appetite is fair with ~50-75% PO intake.     Previous Nutrition Diagnosis: Inadequate oral intake RT lethargy AEB no PO intake for breakfast    Active [ X  ]  Resolved [   ]    Goal: Meet % of nutrition needs via PO    Recommendations:  1. Continue current diet & encourage PO intake  2. Trend weekly weights  3. Honor pt preferences  4. Bowel regimen    Education: Adequate oral intake    Risk Level: High [   ] Moderate [X   ] Low [   ]

## 2018-02-13 NOTE — PROGRESS NOTE ADULT - SUBJECTIVE AND OBJECTIVE BOX
HPI:  68yo male presented to [facility] after sudden onset of severe HA at 10pm followed by collapsing. PT drank beer and took aspirin for the HA and then passed out- pt states his knees gave out but he did not lose conciousness. +photophobia +nuchal rigidity. No N/V, no fever/chills. No head trauma. CTH done in [facility] showed SAH HH2 [place] 4. Pt was transferred to [facility] for further care. (06 Feb 2018 06:14)    OVERNIGHT EVENTS:  Cerebral angiogram yesterday- negative. Remains on 2%, Sm=232 this am. Pain well controlled this am    Vital Signs Last 24 Hrs  T(C): 36.2 (13 Feb 2018 09:22), Max: 37.1 (13 Feb 2018 01:00)  T(F): 97.2 (13 Feb 2018 09:22), Max: 98.8 (13 Feb 2018 01:00)  HR: 60 (13 Feb 2018 11:00) (52 - 86)  BP: 119/54 (13 Feb 2018 11:00) (115/45 - 162/76)  BP(mean): 80 (13 Feb 2018 11:00) (65 - 144)  RR: 13 (13 Feb 2018 11:00) (9 - 34)  SpO2: 95% (13 Feb 2018 11:00) (93% - 99%)    I&O's Detail    12 Feb 2018 07:01  -  13 Feb 2018 07:00  --------------------------------------------------------  IN:    Oral Fluid: 400 mL    sodium chloride 0.9%: 75 mL    sodium chloride 2%: 1050 mL  Total IN: 1525 mL    OUT:    Voided: 4100 mL  Total OUT: 4100 mL    Total NET: -2575 mL      13 Feb 2018 07:01  -  13 Feb 2018 12:39  --------------------------------------------------------  IN:    sodium chloride 2%: 50 mL  Total IN: 50 mL    OUT:  Total OUT: 0 mL    Total NET: 50 mL        I&O's Summary    12 Feb 2018 07:01  -  13 Feb 2018 07:00  --------------------------------------------------------  IN: 1525 mL / OUT: 4100 mL / NET: -2575 mL    13 Feb 2018 07:01  -  13 Feb 2018 12:39  --------------------------------------------------------  IN: 50 mL / OUT: 0 mL / NET: 50 mL        PHYSICAL EXAM:  Neurological:  AAOx3, NAD, coherent speech, Fc  CNII-XII grossly intact, PERRL, EOMI, face symmetric  MAEx4 strength 5/5 UE and LE b/l, no drift  SILT throughout b/l  Incision/wound: R groin incision clean, dry and intact- no hemorrhage or hematoma present  Extremities: extremities warm and well perfused, distal pulses 2+ b/l    TUBES/LINES:  [] CVC  [] A-line  [] Lumbar Drain  [] Ventriculostomy  [] Other    DIET:  [] NPO  [x] Mechanical  [] Tube feeds    LABS:                        11.5   8.9   )-----------( 250      ( 13 Feb 2018 05:58 )             33.5     02-13    133<L>  |  98  |  17  ----------------------------<  152<H>  4.1   |  28  |  0.63    Ca    8.3<L>      13 Feb 2018 05:58  Phos  3.7     02-13  Mg     2.0     02-13      PT/INR - ( 12 Feb 2018 05:33 )   PT: 11.6 sec;   INR: 1.04          PTT - ( 12 Feb 2018 05:33 )  PTT:30.7 sec        CAPILLARY BLOOD GLUCOSE      POCT Blood Glucose.: 228 mg/dL (13 Feb 2018 10:45)  POCT Blood Glucose.: 139 mg/dL (13 Feb 2018 06:11)  POCT Blood Glucose.: 208 mg/dL (12 Feb 2018 22:25)  POCT Blood Glucose.: 150 mg/dL (12 Feb 2018 17:16)      Drug Levels: [] N/A    CSF Analysis: [] N/A      Allergies    No Known Allergies    Intolerances      MEDICATIONS:  Antibiotics:    Neuro:  acetaminophen 300 mG/butalbital 50 mG/ caffeine 40 mG 1 Capsule(s) Oral every 6 hours PRN  cyclobenzaprine 5 milliGRAM(s) Oral three times a day  metoclopramide Injectable 5 milliGRAM(s) IV Push every 6 hours PRN  ondansetron Injectable 4 milliGRAM(s) IV Push every 6 hours PRN  oxyCODONE    IR 10 milliGRAM(s) Oral every 4 hours PRN  oxyCODONE    IR 5 milliGRAM(s) Oral every 4 hours PRN    Anticoagulation:  heparin  Injectable 5000 Unit(s) SubCutaneous every 8 hours    OTHER:  atorvastatin 40 milliGRAM(s) Oral at bedtime  dexamethasone     Tablet 2 milliGRAM(s) Oral every 12 hours  dexamethasone     Tablet   Oral   docusate sodium 100 milliGRAM(s) Oral three times a day  hydrALAZINE Injectable 10 milliGRAM(s) IV Push every 6 hours PRN  influenza   Vaccine 0.5 milliLiter(s) IntraMuscular once  insulin lispro (HumaLOG) corrective regimen sliding scale   SubCutaneous every 6 hours  insulin lispro Injectable (HumaLOG) 4 Unit(s) SubCutaneous three times a day before meals  labetalol Injectable 10 milliGRAM(s) IV Push every 6 hours PRN  lisinopril 10 milliGRAM(s) Oral daily  magnesium hydroxide Suspension 30 milliLiter(s) Oral daily PRN  pantoprazole    Tablet 40 milliGRAM(s) Oral before breakfast  polyethylene glycol 3350 17 Gram(s) Oral daily  senna 2 Tablet(s) Oral at bedtime    IVF:  sodium chloride 2 Gram(s) Oral every 6 hours    CULTURES:    RADIOLOGY & ADDITIONAL TESTS:      ASSESSMENT:  68y Male s/p repeat diagnostic cerebral angiogram POD#0      PLAN:  NEURO:  -neuro checks  -pain control  -stop 2%, f/u repeat BMP at 2pm  -continue salt tabs 2q6  -continue decadron taper (2q12)  -continue lipitor 80  -stop nimodipine today  -stepdown unit today    CARDIOVASCULAR:  --160    PULMONARY:  -room air  -IS    RENAL:  -IVL  -stop 2%, f/u repeat BMP at 2pm    GI:  -regular diet  -bowel regimen  -GI ppx    HEME:  -stable    ID:  -afebrile    ENDO:  -ISS  -continue lispro    DVT PROPHYLAXIS:  [x] Venodynes                                [] Heparin/Lovenox    -d/w Dr. Slaughter, Dr. Amaral and SICU team

## 2018-02-13 NOTE — PROGRESS NOTE ADULT - SUBJECTIVE AND OBJECTIVE BOX
NEUROSURGERY PAIN MANAGEMENT PROGRESS NOTE    OVERNIGHT EVENTS:  - S/p repeat diagnostic angiogram  Findings:   No aneurysm, AVM, or early venous shunting. No vasospasm. 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  - Remains in ICU for vasospasm watch    PLAN/RECOMMENDATIONS:  - Dcd Flexeril, no requirement  - Dc Oxy-IR 10mg, no requirement over past 24hrs  - C/w Fioricet as primary HA abortifacient   - Recommend dc of opiates as soon as clinically appropriate, would not recommend dc of pt with opiates given risk factors  - Will sign off, reconsult for any acute changes in pain day, significant difficulties with activities, or side effects/adverse effects with pain medications    REVIEW OF SYSTEMS:  CONSTITUTIONAL: Afebrile, no fatigue O/N.   NECK/ENT: Denies neck pain & stiffness as described Thursday.   RESPIRATORY: No cough, wheezing; No shortness of breath  CARDIOVASCULAR: No chest pain, palpitations.   NEUROLOGICAL: Denies HAs, currently reports diminished intensity of headaches over the weekend. No loss of strength, no numbness, or tremors. No dizziness or lightheadedness with pain medications.     FUNCTIONAL ASSESSMENT:  PAIN SCORE AT REST:     Denies    SCALE USED: (1-10 VNRS)  PAIN SCORE WITH ACTIVITY:   Denies      SCALE USED: (1-10 VNRS)    PAIN ASSESSMENT:  Denies HAs this AM, reporting some soreness to touch in neck, but denies persistence of pain, self-resolving.    PHYSICAL EXAM  GENERAL: NAD, sleeping in bed, easily arousable to verbal stimuli  NERVOUS SYSTEM:    Alert & Oriented X3, Good concentration;   Cranial nerves grossly intact  Motor exam:  MAEx4 5/5 in 4 extr.          [X] warm well perfused; capillary refill <3 seconds   Sensation intact to LT in UE/LE in 3 dermatomes  CHEST/LUNG: Clear to auscultation bilaterally; No rales, rhonchi, wheezing, or rubs  HEART: Regular rate and rhythm; No murmurs, rubs, or gallops  ABDOMEN: Soft, Nontender, Nondistended; Bowel sounds present  EXTREMITIES:  2+ Peripheral Pulses, No clubbing, cyanosis, or edema      ASSESSMENT:   68y Male s/p cerebral angio POD#2, with SAH PBD#8, neuro intact    PLAN:   1. Opioids  Since yesterday 6am, pt has required: 2x 5mg Oxy-IR.  - Would recommend dc of IR dosing ASAP. Would not recommend opiates for HA/SAH related HAs, but given pt has sig. improved clinically can continue. Would recommend taper prior to dc.     2. Neuropathics: Pt currently denies neuropathic pain. No numbness/tingling/electric shock like sensation in LE/UE b.l.   - No indication for neuropathic agents.     3. Adjuvants: Pt not complaining of muscle spasms/stiffness in neck, c/o on Thursday last week. Fioricet for HAs.   -C/w Fioricet as first line for HAs, dc Flexeril.     4. Prophylactic:   Bowel regimen: Docusate Senna  Nausea PRN: Zofran PRN for Nausea, pt does not c/o current    5. Functional Goals: Pt will get OOB with PT today. Pt will resume previous level of activity without impairment from surgery.     6. Additional Consults: None recommended.     7. Additional Labs/Imaging: None recommended.     8. Follow up, Discharge Planning:   Patient is set for discharge to: Vasospasm watch  Discharge is pending: Vasospasms watch  Pain Management follow up plan: F/u inpatient

## 2018-02-14 LAB
ANION GAP SERPL CALC-SCNC: 10 MMOL/L — SIGNIFICANT CHANGE UP (ref 5–17)
APPEARANCE UR: (no result)
BILIRUB UR-MCNC: NEGATIVE — SIGNIFICANT CHANGE UP
BUN SERPL-MCNC: 17 MG/DL — SIGNIFICANT CHANGE UP (ref 7–23)
CALCIUM SERPL-MCNC: 8.6 MG/DL — SIGNIFICANT CHANGE UP (ref 8.4–10.5)
CHLORIDE SERPL-SCNC: 94 MMOL/L — LOW (ref 96–108)
CO2 SERPL-SCNC: 29 MMOL/L — SIGNIFICANT CHANGE UP (ref 22–31)
COLOR SPEC: YELLOW — SIGNIFICANT CHANGE UP
CREAT SERPL-MCNC: 0.63 MG/DL — SIGNIFICANT CHANGE UP (ref 0.5–1.3)
DIFF PNL FLD: NEGATIVE — SIGNIFICANT CHANGE UP
GLUCOSE SERPL-MCNC: 136 MG/DL — HIGH (ref 70–99)
GLUCOSE UR QL: 100
HCT VFR BLD CALC: 35.2 % — LOW (ref 39–50)
HGB BLD-MCNC: 12.1 G/DL — LOW (ref 13–17)
KETONES UR-MCNC: NEGATIVE — SIGNIFICANT CHANGE UP
LEUKOCYTE ESTERASE UR-ACNC: NEGATIVE — SIGNIFICANT CHANGE UP
MAGNESIUM SERPL-MCNC: 2.2 MG/DL — SIGNIFICANT CHANGE UP (ref 1.6–2.6)
MCHC RBC-ENTMCNC: 31.2 PG — SIGNIFICANT CHANGE UP (ref 27–34)
MCHC RBC-ENTMCNC: 34.4 G/DL — SIGNIFICANT CHANGE UP (ref 32–36)
MCV RBC AUTO: 90.7 FL — SIGNIFICANT CHANGE UP (ref 80–100)
NITRITE UR-MCNC: NEGATIVE — SIGNIFICANT CHANGE UP
OSMOLALITY UR: 692 MOSMOL/KG — HIGH (ref 100–650)
PH UR: 7.5 — SIGNIFICANT CHANGE UP (ref 5–8)
PHOSPHATE SERPL-MCNC: 3.9 MG/DL — SIGNIFICANT CHANGE UP (ref 2.5–4.5)
PLATELET # BLD AUTO: 277 K/UL — SIGNIFICANT CHANGE UP (ref 150–400)
POTASSIUM SERPL-MCNC: 3.7 MMOL/L — SIGNIFICANT CHANGE UP (ref 3.5–5.3)
POTASSIUM SERPL-SCNC: 3.7 MMOL/L — SIGNIFICANT CHANGE UP (ref 3.5–5.3)
PROT UR-MCNC: NEGATIVE MG/DL — SIGNIFICANT CHANGE UP
RBC # BLD: 3.88 M/UL — LOW (ref 4.2–5.8)
RBC # FLD: 11.4 % — SIGNIFICANT CHANGE UP (ref 10.3–16.9)
SODIUM SERPL-SCNC: 133 MMOL/L — LOW (ref 135–145)
SODIUM UR-SCNC: 193 MMOL/L — SIGNIFICANT CHANGE UP
SP GR SPEC: 1.01 — SIGNIFICANT CHANGE UP (ref 1–1.03)
UROBILINOGEN FLD QL: 2 E.U./DL
WBC # BLD: 9 K/UL — SIGNIFICANT CHANGE UP (ref 3.8–10.5)
WBC # FLD AUTO: 9 K/UL — SIGNIFICANT CHANGE UP (ref 3.8–10.5)

## 2018-02-14 PROCEDURE — 70450 CT HEAD/BRAIN W/O DYE: CPT | Mod: 26

## 2018-02-14 RX ORDER — INSULIN GLARGINE 100 [IU]/ML
5 INJECTION, SOLUTION SUBCUTANEOUS AT BEDTIME
Qty: 0 | Refills: 0 | Status: DISCONTINUED | OUTPATIENT
Start: 2018-02-14 | End: 2018-02-15

## 2018-02-14 RX ORDER — INSULIN LISPRO 100/ML
6 VIAL (ML) SUBCUTANEOUS
Qty: 0 | Refills: 0 | Status: DISCONTINUED | OUTPATIENT
Start: 2018-02-14 | End: 2018-02-14

## 2018-02-14 RX ORDER — INSULIN LISPRO 100/ML
VIAL (ML) SUBCUTANEOUS AT BEDTIME
Qty: 0 | Refills: 0 | Status: DISCONTINUED | OUTPATIENT
Start: 2018-02-14 | End: 2018-02-15

## 2018-02-14 RX ORDER — INSULIN LISPRO 100/ML
6 VIAL (ML) SUBCUTANEOUS
Qty: 0 | Refills: 0 | Status: DISCONTINUED | OUTPATIENT
Start: 2018-02-14 | End: 2018-02-15

## 2018-02-14 RX ORDER — INSULIN LISPRO 100/ML
VIAL (ML) SUBCUTANEOUS
Qty: 0 | Refills: 0 | Status: DISCONTINUED | OUTPATIENT
Start: 2018-02-14 | End: 2018-02-15

## 2018-02-14 RX ADMIN — LISINOPRIL 10 MILLIGRAM(S): 2.5 TABLET ORAL at 06:47

## 2018-02-14 RX ADMIN — Medication 4 UNIT(S): at 14:50

## 2018-02-14 RX ADMIN — SODIUM CHLORIDE 2 GRAM(S): 9 INJECTION INTRAMUSCULAR; INTRAVENOUS; SUBCUTANEOUS at 06:47

## 2018-02-14 RX ADMIN — Medication 4 UNIT(S): at 08:38

## 2018-02-14 RX ADMIN — SODIUM CHLORIDE 2 GRAM(S): 9 INJECTION INTRAMUSCULAR; INTRAVENOUS; SUBCUTANEOUS at 00:57

## 2018-02-14 RX ADMIN — ATORVASTATIN CALCIUM 40 MILLIGRAM(S): 80 TABLET, FILM COATED ORAL at 22:54

## 2018-02-14 RX ADMIN — INSULIN GLARGINE 5 UNIT(S): 100 INJECTION, SOLUTION SUBCUTANEOUS at 22:55

## 2018-02-14 RX ADMIN — Medication 1 CAPSULE(S): at 03:18

## 2018-02-14 RX ADMIN — Medication 100 MILLIGRAM(S): at 06:47

## 2018-02-14 RX ADMIN — PANTOPRAZOLE SODIUM 40 MILLIGRAM(S): 20 TABLET, DELAYED RELEASE ORAL at 06:47

## 2018-02-14 RX ADMIN — Medication 1 CAPSULE(S): at 18:35

## 2018-02-14 RX ADMIN — Medication 100 MILLIGRAM(S): at 22:55

## 2018-02-14 RX ADMIN — HEPARIN SODIUM 5000 UNIT(S): 5000 INJECTION INTRAVENOUS; SUBCUTANEOUS at 06:47

## 2018-02-14 RX ADMIN — Medication 2 MILLIGRAM(S): at 08:39

## 2018-02-14 RX ADMIN — Medication 100 MILLIGRAM(S): at 14:51

## 2018-02-14 RX ADMIN — Medication 4: at 00:57

## 2018-02-14 RX ADMIN — HEPARIN SODIUM 5000 UNIT(S): 5000 INJECTION INTRAVENOUS; SUBCUTANEOUS at 22:54

## 2018-02-14 RX ADMIN — Medication 1 CAPSULE(S): at 19:25

## 2018-02-14 RX ADMIN — HEPARIN SODIUM 5000 UNIT(S): 5000 INJECTION INTRAVENOUS; SUBCUTANEOUS at 14:51

## 2018-02-14 RX ADMIN — Medication 2: at 14:50

## 2018-02-14 RX ADMIN — SODIUM CHLORIDE 2 GRAM(S): 9 INJECTION INTRAMUSCULAR; INTRAVENOUS; SUBCUTANEOUS at 18:33

## 2018-02-14 RX ADMIN — POLYETHYLENE GLYCOL 3350 17 GRAM(S): 17 POWDER, FOR SOLUTION ORAL at 14:51

## 2018-02-14 RX ADMIN — SODIUM CHLORIDE 2 GRAM(S): 9 INJECTION INTRAMUSCULAR; INTRAVENOUS; SUBCUTANEOUS at 14:51

## 2018-02-14 RX ADMIN — Medication 1 CAPSULE(S): at 02:18

## 2018-02-14 RX ADMIN — SENNA PLUS 2 TABLET(S): 8.6 TABLET ORAL at 23:04

## 2018-02-14 RX ADMIN — Medication 2 MILLIGRAM(S): at 18:33

## 2018-02-14 NOTE — CONSULT NOTE ADULT - SUBJECTIVE AND OBJECTIVE BOX
HPI: 69 yo M w/ no PMH is admitted s/p fall found to have SAH now s/p angio x2. He has not seen doctors for years and does not know of any medical problems. He did not know he was diabetic. He does not think he had any symptoms before he had this recent event. He has been urinating fine and although feels that his mouth is always dry, he does not feel constantly thirsty.     PMH & Surgical Hx:  No pertinent past medical history    FH:  DM: none    SH:  Smokin ppd for years  Etoh: occasional      Home Meds:  none    Current Meds:  acetaminophen 300 mG/butalbital 50 mG/ caffeine 40 mG 1 Capsule(s) Oral every 6 hours PRN  atorvastatin 40 milliGRAM(s) Oral at bedtime  dexamethasone     Tablet 2 milliGRAM(s) Oral every 12 hours  dexamethasone     Tablet   Oral   docusate sodium 100 milliGRAM(s) Oral three times a day  heparin  Injectable 5000 Unit(s) SubCutaneous every 8 hours  hydrALAZINE Injectable 10 milliGRAM(s) IV Push every 6 hours PRN  influenza   Vaccine 0.5 milliLiter(s) IntraMuscular once  insulin lispro (HumaLOG) corrective regimen sliding scale   SubCutaneous every 6 hours  insulin lispro Injectable (HumaLOG) 4 Unit(s) SubCutaneous three times a day before meals  lisinopril 10 milliGRAM(s) Oral daily  magnesium hydroxide Suspension 30 milliLiter(s) Oral daily PRN  metoclopramide Injectable 5 milliGRAM(s) IV Push every 6 hours PRN  ondansetron Injectable 4 milliGRAM(s) IV Push every 6 hours PRN  oxyCODONE    IR 10 milliGRAM(s) Oral every 4 hours PRN  oxyCODONE    IR 5 milliGRAM(s) Oral every 4 hours PRN  pantoprazole    Tablet 40 milliGRAM(s) Oral before breakfast  polyethylene glycol 3350 17 Gram(s) Oral daily  senna 2 Tablet(s) Oral at bedtime  sodium chloride 2 Gram(s) Oral every 6 hours      Allergies:  No Known Allergies      ROS:  Denies the following except as indicated.    General: weight loss/weight gain, decreased appetite, fatigue  Eyes: Blurry vision, double vision, visual changes  ENT: Throat pain, changes in voice,   CV: palpitations, SOB, CP, cough  GI: NVD, difficulty swallowing, abdominal pain  : polyuria, dysuria  Endo: abnormal menses, temperature intolerance, decreased libido  MSK: weakness, joint pain  Skin: rash, dryness, diaphoresis  Heme: Easy bruising,bleeding  Neuro: HA, dizziness, lightheadedness, numbness tingling  Psych: Anxiety, Depression    Vital Signs Last 24 Hrs  T(C): 36.3 (2018 14:19), Max: 37.2 (2018 18:15)  T(F): 97.4 (2018 14:19), Max: 98.9 (2018 18:15)  HR: 62 (2018 12:22) (54 - 74)  BP: 156/74 (2018 12:22) (148/70 - 164/75)  BP(mean): 104 (2018 12:22) (98 - 108)  RR: 16 (2018 12:22) (16 - 18)  SpO2: 96% (2018 12:22) (95% - 98%)        Constitutional: wn/wd in NAD.   HEENT: NCAT, MMM, OP clear, EOMI, , no proptosis or lid retraction  Neck: no thyromegaly or palpable thyroid nodules   Respiratory: lungs CTAB.  Cardiovascular: regular rhythm, normal S1 and S2, no audible murmurs, no peripheral edema  GI: soft, NT/ND, no masses/HSM appreciated.  Neurology: no tremors, DTR 2+  Skin: no visible rashes/lesions  Psychiatric: AAO x 3, normal affect/mood.  Ext: radial pulses intact, DP pulses intact, extremities warm, no cyanosis, clubbing or edema.       LABS:                        12.1   9.0   )-----------( 277      ( 2018 05:54 )             35.2     -14    133<L>  |  94<L>  |  17  ----------------------------<  136<H>  3.7   |  29  |  0.63    Ca    8.6      2018 05:54  Phos  3.9     02-14  Mg     2.2     -14          Hemoglobin A1C, Whole Blood: 8.5 ( @ 06:11)  Hemoglobin A1C, Whole Blood: 8.4 ( @ 05:09)    Thyroid Stimulating Hormone, Serum: 1.359 ( @ 05:09)      RADIOLOGY & ADDITIONAL STUDIES:    CAPILLARY BLOOD GLUCOSE      A/P:68y Male    1.  DM type 2- newly diagnosed, uncontrolled, uncomplicated. Patient with A1C of 8.5. Patient has no insurance and will need diabetes supplies for POC testing and medication for glycemic control. Please have patient seen by diabetes educator.   Please start Lantus 5 units QHS. Please inc to  lispro 6  units before each meal.  Please continue lispro moderate dose sliding scale four times daily with meals and at bedtime    Pt's fingerstick glucose goal is 100-180    Will continue to monitor     2. Hyponatremia- Patient euvolemic on exam. Na still low on salt tabs. Will need complete workup with UA, Urine Osm, Urine Na and serum sodium. Possible SIADH. Cont salt tabs. Will follow up labs    Pt can follow up at discharge with [facility] by calling  to make an appointment.   Will discuss case with Dr. Mccoy  and update primary team HPI: 67 yo M w/ no PMH is admitted s/p fall found to have SAH now s/p angio x2. He has not seen doctors for years and does not know of any medical problems. He did not know he was diabetic. He does not think he had any symptoms before he had this recent event. He has been urinating fine and although feels that his mouth is always dry, he does not feel constantly thirsty.     Patient was found to be hyponatremic to low 130s. He was treated 2% hypertonic saline and   started on salt tabs. His hyponatremia improved but is still persistent. His fsg have been ranging high  100-200s. He was started on premeal insulin tid.   PMH & Surgical Hx:    No pertinent past medical history    FH:  DM: none    SH:  Smokin ppd for years  Etoh: occasional      Home Meds:  none    Current Meds:  acetaminophen 300 mG/butalbital 50 mG/ caffeine 40 mG 1 Capsule(s) Oral every 6 hours PRN  atorvastatin 40 milliGRAM(s) Oral at bedtime  dexamethasone     Tablet 2 milliGRAM(s) Oral every 12 hours  dexamethasone     Tablet   Oral   docusate sodium 100 milliGRAM(s) Oral three times a day  heparin  Injectable 5000 Unit(s) SubCutaneous every 8 hours  hydrALAZINE Injectable 10 milliGRAM(s) IV Push every 6 hours PRN  influenza   Vaccine 0.5 milliLiter(s) IntraMuscular once  insulin lispro (HumaLOG) corrective regimen sliding scale   SubCutaneous every 6 hours  insulin lispro Injectable (HumaLOG) 4 Unit(s) SubCutaneous three times a day before meals  lisinopril 10 milliGRAM(s) Oral daily  magnesium hydroxide Suspension 30 milliLiter(s) Oral daily PRN  metoclopramide Injectable 5 milliGRAM(s) IV Push every 6 hours PRN  ondansetron Injectable 4 milliGRAM(s) IV Push every 6 hours PRN  oxyCODONE    IR 10 milliGRAM(s) Oral every 4 hours PRN  oxyCODONE    IR 5 milliGRAM(s) Oral every 4 hours PRN  pantoprazole    Tablet 40 milliGRAM(s) Oral before breakfast  polyethylene glycol 3350 17 Gram(s) Oral daily  senna 2 Tablet(s) Oral at bedtime  sodium chloride 2 Gram(s) Oral every 6 hours      Allergies:  No Known Allergies      ROS:  Denies the following except as indicated.    General: weight loss/weight gain, decreased appetite, fatigue  Eyes: Blurry vision, double vision, visual changes  ENT: Throat pain, changes in voice,   CV: palpitations, SOB, CP, cough  GI: NVD, difficulty swallowing, abdominal pain  : polyuria, dysuria  Endo: abnormal menses, temperature intolerance, decreased libido  MSK: weakness, joint pain  Skin: rash, dryness, diaphoresis  Heme: Easy bruising,bleeding  Neuro: HA, dizziness, lightheadedness, numbness tingling  Psych: Anxiety, Depression    Vital Signs Last 24 Hrs  T(C): 36.3 (2018 14:19), Max: 37.2 (2018 18:15)  T(F): 97.4 (2018 14:19), Max: 98.9 (2018 18:15)  HR: 62 (2018 12:22) (54 - 74)  BP: 156/74 (2018 12:22) (148/70 - 164/75)  BP(mean): 104 (2018 12:22) (98 - 108)  RR: 16 (2018 12:22) (16 - 18)  SpO2: 96% (2018 12:22) (95% - 98%)        Constitutional: wn/wd in NAD.   HEENT: NCAT, MMM, OP clear, EOMI, , no proptosis or lid retraction  Neck: no thyromegaly or palpable thyroid nodules   Respiratory: lungs CTAB.  Cardiovascular: regular rhythm, normal S1 and S2, no audible murmurs, no peripheral edema  GI: soft, NT/ND, no masses/HSM appreciated.  Neurology: no tremors, DTR 2+  Skin: no visible rashes/lesions  Psychiatric: AAO x 3, normal affect/mood.  Ext: radial pulses intact, DP pulses intact, extremities warm, no cyanosis, clubbing or edema.       LABS:                        12.1   9.0   )-----------( 277      ( 2018 05:54 )             35.2     02-14    133<L>  |  94<L>  |  17  ----------------------------<  136<H>  3.7   |  29  |  0.63    Ca    8.6      2018 05:54  Phos  3.9     -  Mg     2.2               Hemoglobin A1C, Whole Blood: 8.5 ( @ 06:11)  Hemoglobin A1C, Whole Blood: 8.4 ( @ 05:09)    Thyroid Stimulating Hormone, Serum: 1.359 ( @ 05:09)      RADIOLOGY & ADDITIONAL STUDIES:    CAPILLARY BLOOD GLUCOSE      A/P:68y Male    1.  DM type 2- newly diagnosed, uncontrolled, uncomplicated. Patient with A1C of 8.5. Patient has no insurance and will need diabetes supplies for POC testing and medication for glycemic control. Please have patient seen by diabetes educator.   Please start Lantus 5 units QHS. Please inc to  lispro 6  units before each meal.  Please continue lispro moderate dose sliding scale four times daily with meals and at bedtime    Pt's fingerstick glucose goal is 100-180    Will continue to monitor     2. Hyponatremia- Patient euvolemic on exam. Na still low on salt tabs. Will need complete workup with UA, Urine Osm, Urine Na and serum sodium. Possible SIADH. Cont salt tabs. Will follow up labs    Pt can follow up at discharge with [facility] by calling  to make an appointment.   Will discuss case with Dr. Mccoy  and update primary team

## 2018-02-14 NOTE — CONSULT NOTE ADULT - ATTENDING COMMENTS
Endocrinology Attending Consult Note  Patient seen and examined.  Case discussed and agree with Endocrine Fellow's note.  69 yo. male with DM2 (HbA1c 8.5%) s/p fall diagnosed with mild hyponatremia.  REC: Continue salt tablets and check daily electrolytes  Start Lantus 5 units sc qHS and lispro 6 units sc tid pre meals  Consider outpatient oral antidiabetic medication   Diabetes educator and Nutrition Consult

## 2018-02-15 ENCOUNTER — TRANSCRIPTION ENCOUNTER (OUTPATIENT)
Age: 68
End: 2018-02-15

## 2018-02-15 VITALS — TEMPERATURE: 98 F

## 2018-02-15 LAB
ANION GAP SERPL CALC-SCNC: 12 MMOL/L — SIGNIFICANT CHANGE UP (ref 5–17)
BUN SERPL-MCNC: 17 MG/DL — SIGNIFICANT CHANGE UP (ref 7–23)
CALCIUM SERPL-MCNC: 9.1 MG/DL — SIGNIFICANT CHANGE UP (ref 8.4–10.5)
CHLORIDE SERPL-SCNC: 96 MMOL/L — SIGNIFICANT CHANGE UP (ref 96–108)
CO2 SERPL-SCNC: 27 MMOL/L — SIGNIFICANT CHANGE UP (ref 22–31)
CREAT SERPL-MCNC: 0.64 MG/DL — SIGNIFICANT CHANGE UP (ref 0.5–1.3)
GLUCOSE SERPL-MCNC: 154 MG/DL — HIGH (ref 70–99)
HCT VFR BLD CALC: 38.4 % — LOW (ref 39–50)
HGB BLD-MCNC: 13.2 G/DL — SIGNIFICANT CHANGE UP (ref 13–17)
MAGNESIUM SERPL-MCNC: 2.1 MG/DL — SIGNIFICANT CHANGE UP (ref 1.6–2.6)
MCHC RBC-ENTMCNC: 31.1 PG — SIGNIFICANT CHANGE UP (ref 27–34)
MCHC RBC-ENTMCNC: 34.4 G/DL — SIGNIFICANT CHANGE UP (ref 32–36)
MCV RBC AUTO: 90.4 FL — SIGNIFICANT CHANGE UP (ref 80–100)
PHOSPHATE SERPL-MCNC: 4.4 MG/DL — SIGNIFICANT CHANGE UP (ref 2.5–4.5)
PLATELET # BLD AUTO: 318 K/UL — SIGNIFICANT CHANGE UP (ref 150–400)
POTASSIUM SERPL-MCNC: 4.2 MMOL/L — SIGNIFICANT CHANGE UP (ref 3.5–5.3)
POTASSIUM SERPL-SCNC: 4.2 MMOL/L — SIGNIFICANT CHANGE UP (ref 3.5–5.3)
RBC # BLD: 4.25 M/UL — SIGNIFICANT CHANGE UP (ref 4.2–5.8)
RBC # FLD: 11.7 % — SIGNIFICANT CHANGE UP (ref 10.3–16.9)
SODIUM SERPL-SCNC: 135 MMOL/L — SIGNIFICANT CHANGE UP (ref 135–145)
WBC # BLD: 10 K/UL — SIGNIFICANT CHANGE UP (ref 3.8–10.5)
WBC # FLD AUTO: 10 K/UL — SIGNIFICANT CHANGE UP (ref 3.8–10.5)

## 2018-02-15 PROCEDURE — 93970 EXTREMITY STUDY: CPT

## 2018-02-15 PROCEDURE — A9585: CPT

## 2018-02-15 PROCEDURE — 70496 CT ANGIOGRAPHY HEAD: CPT

## 2018-02-15 PROCEDURE — 83735 ASSAY OF MAGNESIUM: CPT

## 2018-02-15 PROCEDURE — 86900 BLOOD TYPING SEROLOGIC ABO: CPT

## 2018-02-15 PROCEDURE — 72156 MRI NECK SPINE W/O & W/DYE: CPT

## 2018-02-15 PROCEDURE — 85610 PROTHROMBIN TIME: CPT

## 2018-02-15 PROCEDURE — 36415 COLL VENOUS BLD VENIPUNCTURE: CPT

## 2018-02-15 PROCEDURE — 86901 BLOOD TYPING SEROLOGIC RH(D): CPT

## 2018-02-15 PROCEDURE — 83930 ASSAY OF BLOOD OSMOLALITY: CPT

## 2018-02-15 PROCEDURE — C1894: CPT

## 2018-02-15 PROCEDURE — 85730 THROMBOPLASTIN TIME PARTIAL: CPT

## 2018-02-15 PROCEDURE — 36430 TRANSFUSION BLD/BLD COMPNT: CPT

## 2018-02-15 PROCEDURE — 93308 TTE F-UP OR LMTD: CPT

## 2018-02-15 PROCEDURE — 86850 RBC ANTIBODY SCREEN: CPT

## 2018-02-15 PROCEDURE — 97161 PT EVAL LOW COMPLEX 20 MIN: CPT

## 2018-02-15 PROCEDURE — 70450 CT HEAD/BRAIN W/O DYE: CPT

## 2018-02-15 PROCEDURE — 83935 ASSAY OF URINE OSMOLALITY: CPT

## 2018-02-15 PROCEDURE — P9035: CPT

## 2018-02-15 PROCEDURE — 97535 SELF CARE MNGMENT TRAINING: CPT

## 2018-02-15 PROCEDURE — C1769: CPT

## 2018-02-15 PROCEDURE — 71045 X-RAY EXAM CHEST 1 VIEW: CPT

## 2018-02-15 PROCEDURE — C1887: CPT

## 2018-02-15 PROCEDURE — 85027 COMPLETE CBC AUTOMATED: CPT

## 2018-02-15 PROCEDURE — 80048 BASIC METABOLIC PNL TOTAL CA: CPT

## 2018-02-15 PROCEDURE — 84300 ASSAY OF URINE SODIUM: CPT

## 2018-02-15 PROCEDURE — 97116 GAIT TRAINING THERAPY: CPT

## 2018-02-15 PROCEDURE — 84100 ASSAY OF PHOSPHORUS: CPT

## 2018-02-15 PROCEDURE — 70553 MRI BRAIN STEM W/O & W/DYE: CPT

## 2018-02-15 PROCEDURE — 83036 HEMOGLOBIN GLYCOSYLATED A1C: CPT

## 2018-02-15 PROCEDURE — 82962 GLUCOSE BLOOD TEST: CPT

## 2018-02-15 PROCEDURE — C1889: CPT

## 2018-02-15 RX ORDER — SODIUM CHLORIDE 9 MG/ML
2 INJECTION INTRAMUSCULAR; INTRAVENOUS; SUBCUTANEOUS
Qty: 56 | Refills: 0 | OUTPATIENT
Start: 2018-02-15 | End: 2018-02-21

## 2018-02-15 RX ORDER — SENNA PLUS 8.6 MG/1
2 TABLET ORAL
Qty: 0 | Refills: 0 | COMMUNITY
Start: 2018-02-15

## 2018-02-15 RX ORDER — ATORVASTATIN CALCIUM 80 MG/1
1 TABLET, FILM COATED ORAL
Qty: 7 | Refills: 0 | OUTPATIENT
Start: 2018-02-15 | End: 2018-02-21

## 2018-02-15 RX ORDER — LISINOPRIL 2.5 MG/1
1 TABLET ORAL
Qty: 7 | Refills: 0 | OUTPATIENT
Start: 2018-02-15 | End: 2018-02-21

## 2018-02-15 RX ORDER — DOCUSATE SODIUM 100 MG
1 CAPSULE ORAL
Qty: 0 | Refills: 0 | COMMUNITY
Start: 2018-02-15

## 2018-02-15 RX ADMIN — SODIUM CHLORIDE 2 GRAM(S): 9 INJECTION INTRAMUSCULAR; INTRAVENOUS; SUBCUTANEOUS at 00:51

## 2018-02-15 RX ADMIN — Medication 6 UNIT(S): at 12:23

## 2018-02-15 RX ADMIN — POLYETHYLENE GLYCOL 3350 17 GRAM(S): 17 POWDER, FOR SOLUTION ORAL at 12:20

## 2018-02-15 RX ADMIN — Medication 1 CAPSULE(S): at 00:51

## 2018-02-15 RX ADMIN — Medication 4: at 12:21

## 2018-02-15 RX ADMIN — LISINOPRIL 10 MILLIGRAM(S): 2.5 TABLET ORAL at 06:08

## 2018-02-15 RX ADMIN — Medication 100 MILLIGRAM(S): at 06:09

## 2018-02-15 RX ADMIN — PANTOPRAZOLE SODIUM 40 MILLIGRAM(S): 20 TABLET, DELAYED RELEASE ORAL at 06:09

## 2018-02-15 RX ADMIN — SODIUM CHLORIDE 2 GRAM(S): 9 INJECTION INTRAMUSCULAR; INTRAVENOUS; SUBCUTANEOUS at 06:09

## 2018-02-15 RX ADMIN — Medication 6 UNIT(S): at 08:43

## 2018-02-15 RX ADMIN — SODIUM CHLORIDE 2 GRAM(S): 9 INJECTION INTRAMUSCULAR; INTRAVENOUS; SUBCUTANEOUS at 13:51

## 2018-02-15 RX ADMIN — HEPARIN SODIUM 5000 UNIT(S): 5000 INJECTION INTRAVENOUS; SUBCUTANEOUS at 06:09

## 2018-02-15 NOTE — PROGRESS NOTE ADULT - NSHPATTENDINGPLANDISCUSS_GEN_ALL_CORE
nsg, ICU team
ICU team
RN, house staff.
primary team.

## 2018-02-15 NOTE — DISCHARGE NOTE ADULT - PATIENT PORTAL LINK FT
You can access the [facility] [facility] Portal, offered by [facility], by registering with the following website: [email or web address]

## 2018-02-15 NOTE — DISCHARGE NOTE ADULT - CARE PROVIDER_API CALL
Andi Slaughter), Neurosurgery  [address], [place] [facility]  Phone: [phone]  Fax: [phone]

## 2018-02-15 NOTE — PROGRESS NOTE ADULT - SUBJECTIVE AND OBJECTIVE BOX
INTERVAL HPI/OVERNIGHT EVENTS:    Patient is a 68y old  Male who presents with a chief complaint of Patient c/o headaches for 1 month (15 Feb 2018 12:13)      Pt reports the following symptoms:    CONSTITUTIONAL:  Negative fever or chills, feels well, good appetite  EYES:  Negative  blurry vision or double vision  CARDIOVASCULAR:  Negative for chest pain or palpitations  RESPIRATORY:  Negative for cough, wheezing, or SOB   GASTROINTESTINAL:  Negative for nausea, vomiting, diarrhea, constipation, or abdominal pain  GENITOURINARY:  Negative frequency, urgency or dysuria  NEUROLOGIC:  No headache, confusion, dizziness, lightheadedness    MEDICATIONS  (STANDING):  atorvastatin 40 milliGRAM(s) Oral at bedtime  docusate sodium 100 milliGRAM(s) Oral three times a day  heparin  Injectable 5000 Unit(s) SubCutaneous every 8 hours  influenza   Vaccine 0.5 milliLiter(s) IntraMuscular once  insulin glargine Injectable (LANTUS) 5 Unit(s) SubCutaneous at bedtime  insulin lispro (HumaLOG) corrective regimen sliding scale   SubCutaneous Before meals and at bedtime  insulin lispro Injectable (HumaLOG) 6 Unit(s) SubCutaneous three times a day before meals  lisinopril 10 milliGRAM(s) Oral daily  pantoprazole    Tablet 40 milliGRAM(s) Oral before breakfast  polyethylene glycol 3350 17 Gram(s) Oral daily  senna 2 Tablet(s) Oral at bedtime  sodium chloride 2 Gram(s) Oral every 6 hours    MEDICATIONS  (PRN):  hydrALAZINE Injectable 10 milliGRAM(s) IV Push every 6 hours PRN PRN SBP > 160  magnesium hydroxide Suspension 30 milliLiter(s) Oral daily PRN Constipation  metoclopramide Injectable 5 milliGRAM(s) IV Push every 6 hours PRN nausea, vomiting, headaches  ondansetron Injectable 4 milliGRAM(s) IV Push every 6 hours PRN Nausea and/or Vomiting      PHYSICAL EXAM  Vital Signs Last 24 Hrs  T(C): 37.1 (15 Feb 2018 09:00), Max: 37.1 (15 Feb 2018 04:57)  T(F): 98.8 (15 Feb 2018 09:00), Max: 98.8 (15 Feb 2018 09:00)  HR: 66 (15 Feb 2018 08:40) (52 - 66)  BP: 151/71 (15 Feb 2018 08:40) (150/71 - 159/72)  BP(mean): 101 (15 Feb 2018 08:40) (101 - 107)  RR: 18 (15 Feb 2018 08:40) (17 - 19)  SpO2: 96% (15 Feb 2018 08:40) (95% - 99%)    Constitutional: wn/wd in NAD.   HEENT: NCAT, MMM, OP clear, EOMI, no proptosis or lid retraction  Neck: no thyromegaly or palpable thyroid nodules   Respiratory: lungs CTAB.  Cardiovascular: regular rhythm, normal S1 and S2, no audible murmurs, no peripheral edema  GI: soft, NT/ND, no masses/HSM appreciated.  Neurology: no tremors, DTR 2+  Skin: no visible rashes/lesions  Psychiatric: AAO x 3, normal affect/mood.    LABS:                        13.2   10.0  )-----------( 318      ( 15 Feb 2018 06:53 )             38.4     02-15    135  |  96  |  17  ----------------------------<  154<H>  4.2   |  27  |  0.64    Ca    9.1      15 Feb 2018 06:53  Phos  4.4     02-15  Mg     2.1     02-15        Urinalysis Basic - ( 2018 18:01 )    Color: Yellow / Appearance: SL Cloudy / S.015 / pH: x  Gluc: x / Ketone: NEGATIVE  / Bili: Negative / Urobili: 2.0 E.U./dL   Blood: x / Protein: NEGATIVE mg/dL / Nitrite: NEGATIVE   Leuk Esterase: NEGATIVE / RBC: x / WBC x   Sq Epi: x / Non Sq Epi: x / Bacteria: x      Thyroid Stimulating Hormone, Serum: 1.359 uIU/mL ( @ 05:09)      HbA1C: 8.5 % ( @ 06:11)  8.4 % ( @ 05:09)    CAPILLARY BLOOD GLUCOSE    A/P: 67 yo M w/ no PMH is admitted s/p fall found to have SAH now s/p angio x2.    1.  DM type 2- newly diagnosed, uncontrolled, uncomplicated. Patient with A1C of 8.5. Patient has no insurance and will need diabetes supplies for POC testing and medication for glycemic control. Please have patient seen by diabetes educator.   Please start Lantus 5 units QHS. Please inc to  lispro 8  units before each meal.  Please continue lispro moderate dose sliding scale four times daily with meals and at bedtime    Pt's fingerstick glucose goal is 100-180    Will continue to monitor     Please discharge on Glipizide 10 units BID. Please also prescribe glucometer, lancets, test strips, alcohol pads.    2. Hyponatremia- Patient euvolemic on exam. Urine Na and osm high. Consistent with SIADH. Would keep salt tabs and restrict fluids to 1.5 L/day    Will discuss case with Dr. Mccoy  and update primary team INTERVAL HPI/OVERNIGHT EVENTS:    Patient is a 68y old  Male who presents with a chief complaint of headaches for 1 month (15 Feb 2018 12:13)      Pt reports the following symptoms:    CONSTITUTIONAL:  Negative fever or chills, feels well, good appetite  EYES:  Negative  blurry vision or double vision  CARDIOVASCULAR:  Negative for chest pain or palpitations  RESPIRATORY:  Negative for cough, wheezing, or SOB   GASTROINTESTINAL:  Negative for nausea, vomiting, diarrhea, constipation, or abdominal pain  GENITOURINARY:  Negative frequency, urgency or dysuria  NEUROLOGIC:  No headache, confusion, dizziness, lightheadedness    MEDICATIONS  (STANDING):  atorvastatin 40 milliGRAM(s) Oral at bedtime  docusate sodium 100 milliGRAM(s) Oral three times a day  heparin  Injectable 5000 Unit(s) SubCutaneous every 8 hours  influenza   Vaccine 0.5 milliLiter(s) IntraMuscular once  insulin glargine Injectable (LANTUS) 5 Unit(s) SubCutaneous at bedtime  insulin lispro (HumaLOG) corrective regimen sliding scale   SubCutaneous Before meals and at bedtime  insulin lispro Injectable (HumaLOG) 6 Unit(s) SubCutaneous three times a day before meals  lisinopril 10 milliGRAM(s) Oral daily  pantoprazole    Tablet 40 milliGRAM(s) Oral before breakfast  polyethylene glycol 3350 17 Gram(s) Oral daily  senna 2 Tablet(s) Oral at bedtime  sodium chloride 2 Gram(s) Oral every 6 hours    MEDICATIONS  (PRN):  hydrALAZINE Injectable 10 milliGRAM(s) IV Push every 6 hours PRN PRN SBP > 160  magnesium hydroxide Suspension 30 milliLiter(s) Oral daily PRN Constipation  metoclopramide Injectable 5 milliGRAM(s) IV Push every 6 hours PRN nausea, vomiting, headaches  ondansetron Injectable 4 milliGRAM(s) IV Push every 6 hours PRN Nausea and/or Vomiting      PHYSICAL EXAM  Vital Signs Last 24 Hrs  T(C): 37.1 (15 Feb 2018 09:00), Max: 37.1 (15 Feb 2018 04:57)  T(F): 98.8 (15 Feb 2018 09:00), Max: 98.8 (15 Feb 2018 09:00)  HR: 66 (15 Feb 2018 08:40) (52 - 66)  BP: 151/71 (15 Feb 2018 08:40) (150/71 - 159/72)  BP(mean): 101 (15 Feb 2018 08:40) (101 - 107)  RR: 18 (15 Feb 2018 08:40) (17 - 19)  SpO2: 96% (15 Feb 2018 08:40) (95% - 99%)    Constitutional: wn/wd in NAD.   HEENT: NCAT, MMM, OP clear, EOMI, no proptosis or lid retraction  Neck: no thyromegaly or palpable thyroid nodules   Respiratory: lungs CTAB.  Cardiovascular: regular rhythm, normal S1 and S2, no audible murmurs, no peripheral edema  GI: soft, NT/ND, no masses/HSM appreciated.  Neurology: no tremors, DTR 2+  Skin: no visible rashes/lesions  Psychiatric: AAO x 3, normal affect/mood.    LABS:                        13.2   10.0  )-----------( 318      ( 15 Feb 2018 06:53 )             38.4     02-15    135  |  96  |  17  ----------------------------<  154<H>  4.2   |  27  |  0.64    Ca    9.1      15 Feb 2018 06:53  Phos  4.4     02-15  Mg     2.1     02-15        Urinalysis Basic - ( 2018 18:01 )    Color: Yellow / Appearance: SL Cloudy / S.015 / pH: x  Gluc: x / Ketone: NEGATIVE  / Bili: Negative / Urobili: 2.0 E.U./dL   Blood: x / Protein: NEGATIVE mg/dL / Nitrite: NEGATIVE   Leuk Esterase: NEGATIVE / RBC: x / WBC x   Sq Epi: x / Non Sq Epi: x / Bacteria: x      Thyroid Stimulating Hormone, Serum: 1.359 uIU/mL ( @ 05:09)      HbA1C: 8.5 % ( @ 06:11)  8.4 % ( @ 05:09)    CAPILLARY BLOOD GLUCOSE    A/P: 67 yo M w/ no PMH is admitted s/p fall found to have SAH now s/p angio x2.    1.  DM type 2- newly diagnosed, uncontrolled, uncomplicated. Patient with A1C of 8.5. Patient has no insurance and will need diabetes supplies for POC testing and medication for glycemic control. Please have patient seen by diabetes educator.   Please start Lantus 5 units QHS. Please inc to  lispro 8  units before each meal.  Please continue lispro moderate dose sliding scale four times daily with meals and at bedtime    Pt's fingerstick glucose goal is 100-180    Will continue to monitor     Please discharge on Glipizide 10 units BID. Please also prescribe glucometer, lancets, test strips, alcohol pads.    2. Hyponatremia- Patient euvolemic on exam. Urine Na and osm high. Consistent with SIADH. Would keep salt tabs and restrict fluids to 1.5 L/day    Will discuss case with Dr. Mccoy  and update primary team

## 2018-02-15 NOTE — DISCHARGE NOTE ADULT - CARE PLAN
Principal Discharge DX:	SAH (subarachnoid hemorrhage)  Goal:	Return to previous level of activity and function  Assessment and plan of treatment:	No bending, lifting, twisting at the waist.   Follow up with Dr. Slaughter for further workup and repeat imaging outpatient.   Call the office if you have any of these symptoms:   - Changes in vision  - Worsening headache  - New weakness in your arms, legs or difficulty walking  - Continue your new medications as directed, you need to follow up with [facility] to continue these medications. It is VERY important to continue them. We will provide you with 1 week supply.

## 2018-02-15 NOTE — DISCHARGE NOTE ADULT - NS AS DC STROKE ED MATERIALS
Need for Followup After Discharge/Prescribed Medications/Call 911 for Stroke/Risk Factors for Stroke/Stroke Education Booklet/Stroke Warning Signs and Symptoms

## 2018-02-15 NOTE — PROGRESS NOTE ADULT - ATTENDING COMMENTS
I spent 45 minutes of critical care time examining patient, reviewing vitals, labs, medications, imaging and discussing with the team goals of care to prevent life-threatening in this patient who is at high risk for neurological deterioration or death due to:  re-hemorrhage, seizure, hydrocephalus.
Case discussed and agree with Endocrine Fellow's note.  Rec:  Glipizide 10 mg po bid before meals  Salt tabs and 1.5 liter fluid restriction

## 2018-02-15 NOTE — DISCHARGE NOTE ADULT - HOSPITAL COURSE
68yo male presented to [facility] after sudden onset of severe HA at 10pm of 06/2/18 followed by collapsing. Pt drank beer and took aspirin for the HA and then passed out- pt states his knees gave out but he did not lose conciousness. +photophobia +nuchal rigidity. No N/V, no fever/chills. No head trauma. CTH done in [facility] showed SAH HH2 [place] 4. Pt was transferred to [facility] for further care. Workup was negative. Patient's hospital course was complicated by hyponatremia, severe headaches, and hypo and hyperglycemia d/t undiagnosed DM.     Hospital course is as follows:  2/6 day: CT showed SAH w no mass effect. Cerebral angio showed infundibular dilatation, no aneurysm, AVM or other pathology.   2/7: Re-imaging CTH showed decreased blood. Pt continuing to complain of headache, predominantly occipital.   2/8: Headache improved from yesterday; pt still states is severe. No BM since before admission as well as significantly diminished appetite. Pt more lethargic than yesterday but continues to be arousable and oriented.   2/9: Pt continued to endorse severe headache despite pain control measures. Very low appetite continues, and fingersticks have been low as a result (lantus was not given last night). Still no BM since admission. TCDs negative today  2/10: Pt continued to complain of headaches and poor appetite. Persistent hypertension.   2/11: No acute overnight events.  2/12: Pt underwent diagnostic angiogram. Negative for vasospasms.   2/13: Pt with persistent hyponatremia. 2% dcd. Pain well controlled this am. Moved to SDU.   2/14: Pt stable for discharge today.     Pt was seen by PTOT and recommended for home discharge. Pt is discharged home today. Pt is sent home with 1 week worth of medications. Recommended to follow up with [facility] in 1 week.

## 2018-02-15 NOTE — PROGRESS NOTE ADULT - SUBJECTIVE AND OBJECTIVE BOX
HPI:  66yo male presented to [facility] after sudden onset of severe HA at 10pm followed by collapsing. PT drank beer and took aspirin for the HA and then passed out- pt states his knees gave out but he did not lose conciousness. +photophobia +nuchal rigidity. No N/V, no fever/chills. No head trauma. CTH done in [facility] showed SAH HH2 [place] 4. Pt was transferred to [facility] for further care. (2018 06:14)      S/Overnight events:    No overnight events. Pt reports that he had some mild frontal headache this morning but that it has since dissipated; he states he feels well.       Vital Signs Last 24 Hrs  T(C): 37.1 (15 Feb 2018 09:00), Max: 37.1 (15 Feb 2018 04:57)  T(F): 98.8 (15 Feb 2018 09:00), Max: 98.8 (15 Feb 2018 09:00)  HR: 54 (15 Feb 2018 04:33) (52 - 74)  BP: 150/71 (15 Feb 2018 04:33) (150/71 - 159/72)  BP(mean): 102 (15 Feb 2018 04:33) (102 - 107)  RR: 19 (15 Feb 2018 04:33) (16 - 19)  SpO2: 97% (15 Feb 2018 04:33) (95% - 99%)    I&O's Detail    2018 07:  -  15 Feb 2018 07:00  --------------------------------------------------------  IN:  Total IN: 0 mL    OUT:    Voided: 1500 mL  Total OUT: 1500 mL    Total NET: -1500 mL        I&O's Summary    2018 07:  -  15 Feb 2018 07:00  --------------------------------------------------------  IN: 0 mL / OUT: 1500 mL / NET: -1500 mL        PHYSICAL EXAM:  General: NAD. AAOx3.  HEENT: NC/AT. PERRL, EOM's intact. Poor dentition. VF grossly intact.  Cardio: S1 and S2 clear, RRR, no murmurs, gallops or rubs   Pulm: CTAB, no wheezes, rales or rhonchi  Abdomen: Soft, nontender, nondistended.  No masses or organomegaly. +BS   Skin: Warm and dry, no rashes or lesions. Site of right femoral catheter insertion C/D/I. No hematoma.  Neuro: CN II-XII intact. Strength 5/5 UE & LE. No pronator drift. Sensation intact bilat.  Extremities: Distal pulses intact, DP, PT 2+ symmetric      TUBES/LINES:  [] CVC  [] A-line  [] Lumbar Drain  [] Ventriculostomy  [] Other    DIET:  [] NPO  [x] Mechanical  [] Tube feeds    LABS:                        13.2   10.0  )-----------( 318      ( 15 Feb 2018 06:53 )             38.4     02-15    135  |  96  |  17  ----------------------------<  154<H>  4.2   |  27  |  0.64    Ca    9.1      15 Feb 2018 06:53  Phos  4.4     02-15  Mg     2.1     02-15        Urinalysis Basic - ( 2018 18:01 )    Color: Yellow / Appearance: SL Cloudy / S.015 / pH: x  Gluc: x / Ketone: NEGATIVE  / Bili: Negative / Urobili: 2.0 E.U./dL   Blood: x / Protein: NEGATIVE mg/dL / Nitrite: NEGATIVE   Leuk Esterase: NEGATIVE / RBC: x / WBC x   Sq Epi: x / Non Sq Epi: x / Bacteria: x          CAPILLARY BLOOD GLUCOSE      POCT Blood Glucose.: 132 mg/dL (15 Feb 2018 06:25)  POCT Blood Glucose.: 207 mg/dL (2018 21:16)  POCT Blood Glucose.: 183 mg/dL (2018 17:05)  POCT Blood Glucose.: 189 mg/dL (2018 14:44)  POCT Blood Glucose.: 220 mg/dL (2018 11:05)      Drug Levels: [] N/A    CSF Analysis: [] N/A      Allergies  No Known Allergies    Intolerances      MEDICATIONS:  Antibiotics:    Neuro:  acetaminophen 300 mG/butalbital 50 mG/ caffeine 40 mG 1 Capsule(s) Oral every 6 hours PRN  metoclopramide Injectable 5 milliGRAM(s) IV Push every 6 hours PRN  ondansetron Injectable 4 milliGRAM(s) IV Push every 6 hours PRN  oxyCODONE    IR 10 milliGRAM(s) Oral every 4 hours PRN  oxyCODONE    IR 5 milliGRAM(s) Oral every 4 hours PRN    Anticoagulation:  heparin  Injectable 5000 Unit(s) SubCutaneous every 8 hours    OTHER:  atorvastatin 40 milliGRAM(s) Oral at bedtime  docusate sodium 100 milliGRAM(s) Oral three times a day  hydrALAZINE Injectable 10 milliGRAM(s) IV Push every 6 hours PRN  influenza   Vaccine 0.5 milliLiter(s) IntraMuscular once  insulin glargine Injectable (LANTUS) 5 Unit(s) SubCutaneous at bedtime  insulin lispro (HumaLOG) corrective regimen sliding scale   SubCutaneous Before meals and at bedtime  insulin lispro Injectable (HumaLOG) 6 Unit(s) SubCutaneous three times a day before meals  lisinopril 10 milliGRAM(s) Oral daily  magnesium hydroxide Suspension 30 milliLiter(s) Oral daily PRN  pantoprazole    Tablet 40 milliGRAM(s) Oral before breakfast  polyethylene glycol 3350 17 Gram(s) Oral daily  senna 2 Tablet(s) Oral at bedtime    IVF:  sodium chloride 2 Gram(s) Oral every 6 hours      ASSESSMENT:  68y Male w/SAH non traumatic, S/p cerebral angio x 2 negative  except PCOMM infundibular dilatation      PLAN:  NEURO:  -Pain management PRN  -Hydrocephalus watch  -Neuro checks    CARDIOVASCULAR:  -Off nimodipine     PULMONARY:  -O2 sats good on RA    RENAL:  -Hyponatremia; NA stable at ____    GI:  -Continue bowel regimen    HEME:  -CBC stable     ID:  -Afebrile, no issues    ENDO:  -Decadron taper  -Diabetes teaching pending     DVT PROPHYLAXIS:  [x] Venodynes                                [x] Heparin/Lovenox      DISPOSITION:   -To be discharged home   -PT/OT cleared   -D/w Dr. Slaughter HPI:  66yo male presented to [facility] after sudden onset of severe HA at 10pm followed by collapsing. PT drank beer and took aspirin for the HA and then passed out- pt states his knees gave out but he did not lose conciousness. +photophobia +nuchal rigidity. No N/V, no fever/chills. No head trauma. CTH done in [facility] showed SAH HH2 [place] 4. Pt was transferred to [facility] for further care. (2018 06:14)      S/Overnight events:    No overnight events. Pt reports that he had some mild frontal headache this morning but that it has since dissipated; he states he feels well.       Vital Signs Last 24 Hrs  T(C): 37.1 (15 Feb 2018 09:00), Max: 37.1 (15 Feb 2018 04:57)  T(F): 98.8 (15 Feb 2018 09:00), Max: 98.8 (15 Feb 2018 09:00)  HR: 54 (15 Feb 2018 04:33) (52 - 74)  BP: 150/71 (15 Feb 2018 04:33) (150/71 - 159/72)  BP(mean): 102 (15 Feb 2018 04:33) (102 - 107)  RR: 19 (15 Feb 2018 04:33) (16 - 19)  SpO2: 97% (15 Feb 2018 04:33) (95% - 99%)    I&O's Detail    2018 07:  -  15 Feb 2018 07:00  --------------------------------------------------------  IN:  Total IN: 0 mL    OUT:    Voided: 1500 mL  Total OUT: 1500 mL    Total NET: -1500 mL        I&O's Summary    2018 07:  -  15 Feb 2018 07:00  --------------------------------------------------------  IN: 0 mL / OUT: 1500 mL / NET: -1500 mL        PHYSICAL EXAM:  General: NAD. AAOx3.  HEENT: NC/AT. PERRL, EOM's intact. Poor dentition. VF grossly intact.  Cardio: S1 and S2 clear, RRR, no murmurs, gallops or rubs   Pulm: CTAB, no wheezes, rales or rhonchi  Abdomen: Soft, nontender, nondistended.  No masses or organomegaly. +BS   Skin: Warm and dry, no rashes or lesions. Site of right femoral catheter insertion C/D/I. No hematoma.  Neuro: CN II-XII intact. Strength 5/5 UE & LE. No pronator drift. Sensation intact bilat.  Extremities: Distal pulses intact, DP, PT 2+ symmetric      TUBES/LINES:  [] CVC  [] A-line  [] Lumbar Drain  [] Ventriculostomy  [] Other    DIET:  [] NPO  [x] Mechanical  [] Tube feeds    LABS:                        13.2   10.0  )-----------( 318      ( 15 Feb 2018 06:53 )             38.4     02-15    135  |  96  |  17  ----------------------------<  154<H>  4.2   |  27  |  0.64    Ca    9.1      15 Feb 2018 06:53  Phos  4.4     02-15  Mg     2.1     02-15        Urinalysis Basic - ( 2018 18:01 )    Color: Yellow / Appearance: SL Cloudy / S.015 / pH: x  Gluc: x / Ketone: NEGATIVE  / Bili: Negative / Urobili: 2.0 E.U./dL   Blood: x / Protein: NEGATIVE mg/dL / Nitrite: NEGATIVE   Leuk Esterase: NEGATIVE / RBC: x / WBC x   Sq Epi: x / Non Sq Epi: x / Bacteria: x          CAPILLARY BLOOD GLUCOSE      POCT Blood Glucose.: 132 mg/dL (15 Feb 2018 06:25)  POCT Blood Glucose.: 207 mg/dL (2018 21:16)  POCT Blood Glucose.: 183 mg/dL (2018 17:05)  POCT Blood Glucose.: 189 mg/dL (2018 14:44)  POCT Blood Glucose.: 220 mg/dL (2018 11:05)      Drug Levels: [] N/A    CSF Analysis: [] N/A      Allergies  No Known Allergies    Intolerances      MEDICATIONS:  Antibiotics:    Neuro:  acetaminophen 300 mG/butalbital 50 mG/ caffeine 40 mG 1 Capsule(s) Oral every 6 hours PRN  metoclopramide Injectable 5 milliGRAM(s) IV Push every 6 hours PRN  ondansetron Injectable 4 milliGRAM(s) IV Push every 6 hours PRN  oxyCODONE    IR 10 milliGRAM(s) Oral every 4 hours PRN  oxyCODONE    IR 5 milliGRAM(s) Oral every 4 hours PRN    Anticoagulation:  heparin  Injectable 5000 Unit(s) SubCutaneous every 8 hours    OTHER:  atorvastatin 40 milliGRAM(s) Oral at bedtime  docusate sodium 100 milliGRAM(s) Oral three times a day  hydrALAZINE Injectable 10 milliGRAM(s) IV Push every 6 hours PRN  influenza   Vaccine 0.5 milliLiter(s) IntraMuscular once  insulin glargine Injectable (LANTUS) 5 Unit(s) SubCutaneous at bedtime  insulin lispro (HumaLOG) corrective regimen sliding scale   SubCutaneous Before meals and at bedtime  insulin lispro Injectable (HumaLOG) 6 Unit(s) SubCutaneous three times a day before meals  lisinopril 10 milliGRAM(s) Oral daily  magnesium hydroxide Suspension 30 milliLiter(s) Oral daily PRN  pantoprazole    Tablet 40 milliGRAM(s) Oral before breakfast  polyethylene glycol 3350 17 Gram(s) Oral daily  senna 2 Tablet(s) Oral at bedtime    IVF:  sodium chloride 2 Gram(s) Oral every 6 hours      ASSESSMENT:  68y Male w/SAH non traumatic, S/p cerebral angio x 2 negative  except PCOMM infundibular dilatation      PLAN:  NEURO:  -Pain management PRN  -Hydrocephalus watch  -Neuro checks    CARDIOVASCULAR:  -Off nimodipine     PULMONARY:  -O2 sats good on RA    RENAL:  -Hyponatremia; NA stable at 135 from 133 yesterday    GI:  -Continue bowel regimen    HEME:  -CBC stable     ID:  -Afebrile, no issues    ENDO:  -Diabetes teaching pending     DVT PROPHYLAXIS:  [x] Venodynes                                [x] Heparin/Lovenox      DISPOSITION:   -To be discharged home   -PT/OT pending stairs   -D/w Dr. Slaughter

## 2018-02-15 NOTE — PROGRESS NOTE ADULT - PROVIDER SPECIALTY LIST ADULT
Endocrinology
NSICU
Neurosurgery
Pain Medicine
Neurosurgery
NSICU
NSICU

## 2018-02-15 NOTE — DISCHARGE NOTE ADULT - NS AS ACTIVITY OBS
Walking-Outdoors allowed/Stairs allowed/Return to Work/School allowed/Do not make important decisions/Do not drive or operate machinery/No Heavy lifting/straining/Showering allowed/Walking-Indoors allowed/Bathing allowed

## 2018-02-15 NOTE — DISCHARGE NOTE ADULT - PLAN OF CARE
No bending, lifting, twisting at the waist.   Follow up with Dr. Slaughter for further workup and repeat imaging outpatient.   Call the office if you have any of these symptoms:   - Changes in vision  - Worsening headache  - New weakness in your arms, legs or difficulty walking  - Continue your new medications as directed, you need to follow up with [facility] to continue these medications. It is VERY important to continue them. We will provide you with 1 week supply. Return to previous level of activity and function

## 2018-02-15 NOTE — DISCHARGE NOTE ADULT - MEDICATION SUMMARY - MEDICATIONS TO TAKE
I will START or STAY ON the medications listed below when I get home from the hospital:    lisinopril 10 mg oral tablet  -- 1 tab(s) by mouth once a day  -- Indication: For Hypertension, high blood pressure    glipiZIDE 10 mg oral tablet  -- 1 tab(s) by mouth every 12 hours   -- Avoid prolonged or excessive exposure to direct and/or artificial sunlight while taking this medication.  Do not drink alcoholic beverages when taking this medication.  It is very important that you take or use this exactly as directed.  Do not skip doses or discontinue unless directed by your doctor.  Take this medicine 30 minutes before a meal. Read label carefully for how many times to take each day.    -- Indication: For Diabetes    atorvastatin 40 mg oral tablet  -- 1 tab(s) by mouth once a day (at bedtime)  -- Indication: For Hyperlipidemia    docusate sodium 100 mg oral capsule  -- 1 cap(s) by mouth 3 times a day  -- Indication: For Constipation    senna oral tablet  -- 2 tab(s) by mouth once a day (at bedtime)  -- Indication: For Constipation    sodium chloride 1 g oral tablet  -- 2 tab(s) by mouth every 6 hours  -- Indication: For Hyponatremia

## 2018-02-16 ENCOUNTER — EMERGENCY (EMERGENCY)
Facility: HOSPITAL | Age: 68
LOS: 1 days | Discharge: HOME | End: 2018-02-16
Attending: EMERGENCY MEDICINE | Admitting: EMERGENCY MEDICINE
Payer: MEDICAID

## 2018-02-16 VITALS
DIASTOLIC BLOOD PRESSURE: 84 MMHG | SYSTOLIC BLOOD PRESSURE: 162 MMHG | HEART RATE: 68 BPM | TEMPERATURE: 99 F | RESPIRATION RATE: 16 BRPM | OXYGEN SATURATION: 98 %

## 2018-02-16 VITALS
HEART RATE: 69 BPM | TEMPERATURE: 98 F | OXYGEN SATURATION: 97 % | DIASTOLIC BLOOD PRESSURE: 86 MMHG | WEIGHT: 154.98 LBS | SYSTOLIC BLOOD PRESSURE: 162 MMHG | RESPIRATION RATE: 16 BRPM

## 2018-02-16 DIAGNOSIS — Z79.899 OTHER LONG TERM (CURRENT) DRUG THERAPY: ICD-10-CM

## 2018-02-16 DIAGNOSIS — Z87.891 PERSONAL HISTORY OF NICOTINE DEPENDENCE: ICD-10-CM

## 2018-02-16 DIAGNOSIS — E11.9 TYPE 2 DIABETES MELLITUS WITHOUT COMPLICATIONS: ICD-10-CM

## 2018-02-16 DIAGNOSIS — Z90.49 ACQUIRED ABSENCE OF OTHER SPECIFIED PARTS OF DIGESTIVE TRACT: Chronic | ICD-10-CM

## 2018-02-16 DIAGNOSIS — R51 HEADACHE: ICD-10-CM

## 2018-02-16 DIAGNOSIS — Z79.84 LONG TERM (CURRENT) USE OF ORAL HYPOGLYCEMIC DRUGS: ICD-10-CM

## 2018-02-16 DIAGNOSIS — I10 ESSENTIAL (PRIMARY) HYPERTENSION: ICD-10-CM

## 2018-02-16 DIAGNOSIS — E78.5 HYPERLIPIDEMIA, UNSPECIFIED: ICD-10-CM

## 2018-02-16 LAB
APTT BLD: 27.9 SEC — SIGNIFICANT CHANGE UP (ref 27.5–37.4)
BASOPHILS NFR BLD AUTO: 0.2 % — SIGNIFICANT CHANGE UP (ref 0–2)
CK MB CFR SERPL CALC: 1.3 NG/ML — SIGNIFICANT CHANGE UP (ref 0–6.7)
EOSINOPHIL NFR BLD AUTO: 0.7 % — SIGNIFICANT CHANGE UP (ref 0–6)
HCT VFR BLD CALC: 37.7 % — LOW (ref 39–50)
HGB BLD-MCNC: 13.3 G/DL — SIGNIFICANT CHANGE UP (ref 13–17)
INR BLD: 1.1 — SIGNIFICANT CHANGE UP (ref 0.88–1.16)
LYMPHOCYTES # BLD AUTO: 13.4 % — SIGNIFICANT CHANGE UP (ref 13–44)
MCHC RBC-ENTMCNC: 31.4 PG — SIGNIFICANT CHANGE UP (ref 27–34)
MCHC RBC-ENTMCNC: 35.3 G/DL — SIGNIFICANT CHANGE UP (ref 32–36)
MCV RBC AUTO: 88.9 FL — SIGNIFICANT CHANGE UP (ref 80–100)
MONOCYTES NFR BLD AUTO: 11.2 % — SIGNIFICANT CHANGE UP (ref 2–14)
NEUTROPHILS NFR BLD AUTO: 74.5 % — SIGNIFICANT CHANGE UP (ref 43–77)
PLATELET # BLD AUTO: 321 K/UL — SIGNIFICANT CHANGE UP (ref 150–400)
PROTHROM AB SERPL-ACNC: 12.2 SEC — SIGNIFICANT CHANGE UP (ref 9.8–12.7)
RBC # BLD: 4.24 M/UL — SIGNIFICANT CHANGE UP (ref 4.2–5.8)
RBC # FLD: 11.5 % — SIGNIFICANT CHANGE UP (ref 10.3–16.9)
TROPONIN T SERPL-MCNC: <0.01 NG/ML — SIGNIFICANT CHANGE UP (ref 0–0.01)
WBC # BLD: 12.8 K/UL — HIGH (ref 3.8–10.5)
WBC # FLD AUTO: 12.8 K/UL — HIGH (ref 3.8–10.5)

## 2018-02-16 PROCEDURE — 70450 CT HEAD/BRAIN W/O DYE: CPT | Mod: 26,59

## 2018-02-16 PROCEDURE — 70498 CT ANGIOGRAPHY NECK: CPT | Mod: 26

## 2018-02-16 PROCEDURE — 80053 COMPREHEN METABOLIC PANEL: CPT

## 2018-02-16 PROCEDURE — 82550 ASSAY OF CK (CPK): CPT

## 2018-02-16 PROCEDURE — 70496 CT ANGIOGRAPHY HEAD: CPT

## 2018-02-16 PROCEDURE — 99053 MED SERV 10PM-8AM 24 HR FAC: CPT

## 2018-02-16 PROCEDURE — 36415 COLL VENOUS BLD VENIPUNCTURE: CPT

## 2018-02-16 PROCEDURE — 70450 CT HEAD/BRAIN W/O DYE: CPT

## 2018-02-16 PROCEDURE — 85025 COMPLETE CBC W/AUTO DIFF WBC: CPT

## 2018-02-16 PROCEDURE — 84484 ASSAY OF TROPONIN QUANT: CPT

## 2018-02-16 PROCEDURE — 99284 EMERGENCY DEPT VISIT MOD MDM: CPT | Mod: 25

## 2018-02-16 PROCEDURE — 96374 THER/PROPH/DIAG INJ IV PUSH: CPT | Mod: XU

## 2018-02-16 PROCEDURE — 99285 EMERGENCY DEPT VISIT HI MDM: CPT | Mod: 25

## 2018-02-16 PROCEDURE — 82553 CREATINE MB FRACTION: CPT

## 2018-02-16 PROCEDURE — 93005 ELECTROCARDIOGRAM TRACING: CPT

## 2018-02-16 PROCEDURE — 96375 TX/PRO/DX INJ NEW DRUG ADDON: CPT | Mod: XU

## 2018-02-16 PROCEDURE — 93010 ELECTROCARDIOGRAM REPORT: CPT

## 2018-02-16 PROCEDURE — 85730 THROMBOPLASTIN TIME PARTIAL: CPT

## 2018-02-16 PROCEDURE — 70496 CT ANGIOGRAPHY HEAD: CPT | Mod: 26

## 2018-02-16 PROCEDURE — 85610 PROTHROMBIN TIME: CPT

## 2018-02-16 PROCEDURE — 70498 CT ANGIOGRAPHY NECK: CPT

## 2018-02-16 RX ORDER — MORPHINE SULFATE 50 MG/1
4 CAPSULE, EXTENDED RELEASE ORAL ONCE
Qty: 0 | Refills: 0 | Status: DISCONTINUED | OUTPATIENT
Start: 2018-02-16 | End: 2018-02-16

## 2018-02-16 RX ORDER — DEXAMETHASONE 0.5 MG/5ML
4 ELIXIR ORAL ONCE
Qty: 0 | Refills: 0 | Status: COMPLETED | OUTPATIENT
Start: 2018-02-16 | End: 2018-02-16

## 2018-02-16 RX ORDER — KETOROLAC TROMETHAMINE 30 MG/ML
30 SYRINGE (ML) INJECTION ONCE
Qty: 0 | Refills: 0 | Status: DISCONTINUED | OUTPATIENT
Start: 2018-02-16 | End: 2018-02-16

## 2018-02-16 RX ORDER — LISINOPRIL 2.5 MG/1
10 TABLET ORAL DAILY
Qty: 0 | Refills: 0 | Status: DISCONTINUED | OUTPATIENT
Start: 2018-02-16 | End: 2018-02-20

## 2018-02-16 RX ORDER — LABETALOL HCL 100 MG
10 TABLET ORAL ONCE
Qty: 0 | Refills: 0 | Status: COMPLETED | OUTPATIENT
Start: 2018-02-16 | End: 2018-02-16

## 2018-02-16 RX ADMIN — Medication 10 MILLIGRAM(S): at 09:32

## 2018-02-16 RX ADMIN — MORPHINE SULFATE 4 MILLIGRAM(S): 50 CAPSULE, EXTENDED RELEASE ORAL at 08:12

## 2018-02-16 RX ADMIN — MORPHINE SULFATE 4 MILLIGRAM(S): 50 CAPSULE, EXTENDED RELEASE ORAL at 08:42

## 2018-02-16 RX ADMIN — Medication 4 MILLIGRAM(S): at 12:38

## 2018-02-16 RX ADMIN — LISINOPRIL 10 MILLIGRAM(S): 2.5 TABLET ORAL at 08:12

## 2018-02-16 NOTE — ED PROVIDER NOTE - OBJECTIVE STATEMENT
68y male h/o SAH (DCd from here yesterday for this), HTN, DM, c/o sudden-onset occipital headache radiating to forehead that began last night, similar to prior to his SAH diagnosis. No trauma. No change in vision, N/V, neck/back/chest/abd pain. States he did not take any meds today, no BP meds, took pain meds last night without relief.

## 2018-02-16 NOTE — ED ADULT NURSE REASSESSMENT NOTE - NS ED NURSE REASSESS COMMENT FT1
Symptoms and vital signs improved, no headache or other symptom complaint, VSS.  discharged to home in stable condition.  Decadron 4mg PO adminsitered prior to discharge.

## 2018-02-16 NOTE — ED ADULT NURSE REASSESSMENT NOTE - NS ED NURSE REASSESS COMMENT FT1
received pt from night shift RN Jayme. PT is complaining of headache and is hypertensive 196/91. MD notified. Pt pending CT scan.

## 2018-02-16 NOTE — ED ADULT NURSE NOTE - PMH
DM (diabetes mellitus)    HLD (hyperlipidemia)    HTN (hypertension)    SAH (subarachnoid hemorrhage)

## 2018-02-16 NOTE — CONSULT NOTE ADULT - SUBJECTIVE AND OBJECTIVE BOX
Neurosurgery Consult note:    HISTORY OF PRESENT ILLNESS:   66 y/o male PMH SAH, DM, HLD, HTN presents to ED s/p recent admission to [facility] (discharged yesterday) for SAH on 18, HH2, [place] 4. Work up was negative for aneurysm. Hospital course was complicated by hyponatremia, severe HA, hypo and hyperglycemia d/t undiagnosed DM. Patient was cleared for home discharge and was stable for discharge. Today, patient presents to ED c/o HA. Patient states that the headache is similar to his HA when we was in the hospital. Patient has not been taking any pain medications since he was discharged. Denies any trauma, fall, change in vision, CP, SOB, fever.   	    PAST MEDICAL & SURGICAL HISTORY:  SAH (subarachnoid hemorrhage)  DM (diabetes mellitus)  HLD (hyperlipidemia)  HTN (hypertension)  S/P appendectomy    FAMILY HISTORY:  No pertinent family history in first degree relatives      SOCIAL HISTORY:  Tobacco Use:  EtOH use:   Substance:    Allergies    No Known Allergies    Intolerances        REVIEW OF SYSTEMS  See HPI	    MEDICATIONS:  Antibiotics:    Neuro:    Anticoagulation:    OTHER:  lisinopril 10 milliGRAM(s) Oral daily    IVF:      Vital Signs Last 24 Hrs  T(C): 37.1 (2018 11:04), Max: 37.1 (2018 11:04)  T(F): 98.8 (2018 11:04), Max: 98.8 (2018 11:04)  HR: 64 (2018 11:04) (61 - 85)  BP: 186/86 (2018 11:04) (150/75 - 225/91)  BP(mean): --  RR: 18 (2018 11:04) (16 - 18)  SpO2: 98% (2018 11:04) (95% - 99%)    PHYSICAL EXAM:  Gen: Laying in hospital bed comfortably, NAD  Neuro: AA+Ox3, following commands, opens eyes spontaneously  CN II-XII grossly intact, EOMI, PERRL  MAEx4, strength 5/5 throughout. No drift.    LABS:                        13.3   12.8  )-----------( 321      ( 2018 06:37 )             37.7     02-16    133<L>  |  94<L>  |  23  ----------------------------<  231<H>  4.7   |  23  |  0.64    Ca    9.4      2018 06:37  Phos  4.4     02-15  Mg     2.1     02-15    TPro  7.7  /  Alb  4.1  /  TBili  0.6  /  DBili  x   /  AST  21  /  ALT  38  /  AlkPhos  65  02-16    PT/INR - ( 2018 06:37 )   PT: 12.2 sec;   INR: 1.10          PTT - ( 2018 06:37 )  PTT:27.9 sec  Urinalysis Basic - ( 2018 18:01 )    Color: Yellow / Appearance: SL Cloudy / S.015 / pH: x  Gluc: x / Ketone: NEGATIVE  / Bili: Negative / Urobili: 2.0 E.U./dL   Blood: x / Protein: NEGATIVE mg/dL / Nitrite: NEGATIVE   Leuk Esterase: NEGATIVE / RBC: x / WBC x   Sq Epi: x / Non Sq Epi: x / Bacteria: x      CULTURES:      RADIOLOGY & ADDITIONAL STUDIES:    CT head: No acute intracranial hemorrhage. Stable ventricular size. No change   compared to the study from two days ago.    CTA: Unchanged CTA examination of the brain compared to2018. Again, no   aneurysm identified.    Assessment:  66 y/o male with PMH SAH, DM, HLD, HTN presents to ED with headache, s/p SAH. CT head reviewed, stable from . CTA reviewed and stable from .      Plan:  - We do not recommend surgical intervention  - We recommend pain medications   - We recommend salt tabs for hyponatremia ()  - We recommend that patient f/u with Endocrinologist for DM and hyponatremia  - We recommend 1 week Decadron taper starting at 4mg Q6 hours  - Follow up with Dr. Slaughter in his office on 18. Call the office today to make an appointment: [phone].     D/w Dr. Slaughter

## 2018-02-16 NOTE — ED ADULT NURSE NOTE - OBJECTIVE STATEMENT
pt received into spot 3 A&Ox 3 ambulatory with steady gait BIBA from home complaining of head ache. Started around 630pm yesterday, s/p 9 day admission at [facility] hospital after nontraumatic SAH dx. Pt reports he has an 8/10 head ache with some nausea no vomiting. Denies blurry vision. No facial droop noted b/l equal hand grasps full ROM extremities x4. Denies CP SOB lightheadedness dizziness weakness fever chills abd pain or urinary symptoms. Reports body pain from laying in bed for 9 days. 20G placed to LAC labs drawn and sent pt placed on cardiac monitor EKG performed NSR noted. Respirations appear even and unlabored sating at 97% on room air. Awaiting MD horowitz will monitor and reassess, pt in NAD informed and agreeable to plan

## 2018-02-16 NOTE — ED ADULT NURSE NOTE - CHPI ED SYMPTOMS NEG
no change in level of consciousness/no loss of consciousness/no numbness/no weakness/no vomiting/no blurred vision/no dizziness/no fever

## 2018-02-16 NOTE — ED PROVIDER NOTE - MEDICAL DECISION MAKING DETAILS
68y male headache with recent h/o SAH. HTN-ve, did not take his anti-HTN meds - will dose here and give pain meds. Labs ok. CT head, CTA head/neck pending. 68y male headache with recent h/o SAH. HTN-ve, did not take his anti-HTN meds - will dose here and give pain meds. Labs ok. CT head WNL, CTA head/neck WNL. Seen by neurosurgery - recommend steroid taper, salt tabs with endocrine f/up, pain meds, and f/up with Dr Arthur next week, Return precautions given.

## 2018-02-19 DIAGNOSIS — I60.9 NONTRAUMATIC SUBARACHNOID HEMORRHAGE, UNSPECIFIED: ICD-10-CM

## 2018-02-19 DIAGNOSIS — E11.65 TYPE 2 DIABETES MELLITUS WITH HYPERGLYCEMIA: ICD-10-CM

## 2018-02-19 DIAGNOSIS — I10 ESSENTIAL (PRIMARY) HYPERTENSION: ICD-10-CM

## 2018-02-19 DIAGNOSIS — F17.210 NICOTINE DEPENDENCE, CIGARETTES, UNCOMPLICATED: ICD-10-CM

## 2018-02-19 DIAGNOSIS — E87.1 HYPO-OSMOLALITY AND HYPONATREMIA: ICD-10-CM

## 2018-02-19 DIAGNOSIS — Z28.21 IMMUNIZATION NOT CARRIED OUT BECAUSE OF PATIENT REFUSAL: ICD-10-CM

## 2018-02-19 DIAGNOSIS — E11.649 TYPE 2 DIABETES MELLITUS WITH HYPOGLYCEMIA WITHOUT COMA: ICD-10-CM

## 2018-02-22 PROBLEM — Z00.00 ENCOUNTER FOR PREVENTIVE HEALTH EXAMINATION: Status: ACTIVE | Noted: 2018-02-22

## 2018-02-26 ENCOUNTER — APPOINTMENT (OUTPATIENT)
Dept: NEUROSURGERY | Facility: CLINIC | Age: 68
End: 2018-02-26
Payer: MEDICAID

## 2018-02-26 VITALS
DIASTOLIC BLOOD PRESSURE: 81 MMHG | OXYGEN SATURATION: 97 % | WEIGHT: 143 LBS | HEART RATE: 71 BPM | HEIGHT: 70 IN | BODY MASS INDEX: 20.47 KG/M2 | SYSTOLIC BLOOD PRESSURE: 143 MMHG

## 2018-02-26 PROCEDURE — 99215 OFFICE O/P EST HI 40 MIN: CPT

## 2018-12-26 NOTE — DIETITIAN INITIAL EVALUATION ADULT. - NS AS NUTRI DX ORAL SUPORT IN2
"Chief Complaint   Patient presents with   • Follow-up   • Sleeping Problem         Subjective   Monica Ngo is a 49 y.o. female.     History of Present Illness   Patient comes back today for follow up of Sleep apnea. Patient doesn't report any issues with the machine or mask.     Patient says that the compliance with the use of the equipment is good.  She did however have some issues with \"hormones\".      She is now back to using her AutoPap regularly and says that her symptoms of sleep disturbance & daytime sleepiness have been helped greatly with the use of PAP, as prescribed.     The following portions of the patient's history were reviewed and updated as appropriate: allergies, current medications, past family history, past medical history, past social history and past surgical history.    Review of Systems   Constitutional: Negative for chills and fever.   HENT: Positive for rhinorrhea. Negative for sinus pressure, sneezing and sore throat.    Respiratory: Negative for cough and shortness of breath.    Psychiatric/Behavioral: Negative for sleep disturbance.       Objective   Visit Vitals  /70   Pulse 77   Resp 18   Ht 175.3 cm (69.02\")   Wt 87.5 kg (193 lb)   SpO2 98%   BMI 28.49 kg/m²       Physical Exam   Constitutional: She is oriented to person, place, and time. She appears well-developed and well-nourished.   HENT:   Head: Atraumatic.   Crowded oropharynx.    Musculoskeletal:   Gait was normal.   Neurological: She is alert and oriented to person, place, and time.   Psychiatric: She has a normal mood and affect.   Vitals reviewed.        Assessment/Plan   Monica was seen today for follow-up and sleeping problem.    Diagnoses and all orders for this visit:    Obstructive sleep apnea  -     BIPAP / CPAP Adjustment         No Follow-up on file.    DISCUSSION (if any):  Continue treatment with AutoPAP at a pressure of 6/14, with a nasal mask.    Patient's compliance data was reviewed and she " appears to be very compliant.    Humidification setup, hose and mask care discussed.    Use every night for atleast 4 hours stressed.        Dictated utilizing Dragon dictation.    This document was electronically signed by Kali Francisco MD December 26, 2018  2:13 PM    Inadequate oral intake

## 2019-11-29 NOTE — DISCHARGE NOTE ADULT - CLICK TO LAUNCH ORM
[Dear  ___] : Dear  [unfilled], [Consult Letter:] : I had the pleasure of evaluating your patient, [unfilled]. [Please see my note below.] : Please see my note below. [Consult Closing:] : Thank you very much for allowing me to participate in the care of this patient.  If you have any questions, please do not hesitate to contact me. [FreeTextEntry2] : Dr. Sai Robert [FreeTextEntry3] : Sincerely yours,\par \par Amee Carreon MD, FACS\par Assistant Professor of Surgery\par [facility] .

## 2020-10-07 ENCOUNTER — APPOINTMENT (INPATIENT)
Dept: MRI IMAGING | Facility: HOSPITAL | Age: 70
DRG: 644 | End: 2020-10-07

## 2020-10-07 ENCOUNTER — HOSPITAL ENCOUNTER (INPATIENT)
Facility: HOSPITAL | Age: 70
LOS: 4 days | Discharge: HOME | DRG: 644 | End: 2020-10-11
Attending: EMERGENCY MEDICINE | Admitting: INTERNAL MEDICINE

## 2020-10-07 ENCOUNTER — APPOINTMENT (EMERGENCY)
Dept: CT IMAGING | Facility: HOSPITAL | Age: 70
DRG: 644 | End: 2020-10-07

## 2020-10-07 DIAGNOSIS — R42 LIGHTHEADEDNESS: Primary | ICD-10-CM

## 2020-10-07 DIAGNOSIS — R79.89 LOW TSH LEVEL: ICD-10-CM

## 2020-10-07 DIAGNOSIS — E27.40 LOW SERUM CORTISOL LEVEL (HCC): ICD-10-CM

## 2020-10-07 DIAGNOSIS — R29.90 STROKE-LIKE SYMPTOMS: ICD-10-CM

## 2020-10-07 DIAGNOSIS — R51.9 HEADACHE: ICD-10-CM

## 2020-10-07 DIAGNOSIS — K59.00 CONSTIPATION: ICD-10-CM

## 2020-10-07 DIAGNOSIS — R51.9 NONINTRACTABLE HEADACHE, UNSPECIFIED CHRONICITY PATTERN, UNSPECIFIED HEADACHE TYPE: ICD-10-CM

## 2020-10-07 DIAGNOSIS — E87.1 HYPONATREMIA: ICD-10-CM

## 2020-10-07 PROBLEM — R00.1 BRADYCARDIA: Status: ACTIVE | Noted: 2020-10-07

## 2020-10-07 PROBLEM — I16.0 HYPERTENSIVE URGENCY: Status: ACTIVE | Noted: 2020-10-07

## 2020-10-07 PROBLEM — D72.829 LEUKOCYTOSIS: Status: ACTIVE | Noted: 2020-10-07

## 2020-10-07 LAB
ALBUMIN SERPL BCP-MCNC: 3.4 G/DL (ref 3.5–5)
ALP SERPL-CCNC: 52 U/L (ref 46–116)
ALT SERPL W P-5'-P-CCNC: 25 U/L (ref 12–78)
ANION GAP SERPL CALCULATED.3IONS-SCNC: 10 MMOL/L (ref 4–13)
AST SERPL W P-5'-P-CCNC: 30 U/L (ref 5–45)
ATRIAL RATE: 55 BPM
BASOPHILS # BLD AUTO: 0.13 THOUSANDS/ΜL (ref 0–0.1)
BASOPHILS NFR BLD AUTO: 1 % (ref 0–1)
BILIRUB SERPL-MCNC: 0.5 MG/DL (ref 0.2–1)
BUN SERPL-MCNC: 16 MG/DL (ref 5–25)
CALCIUM ALBUM COR SERPL-MCNC: 10.1 MG/DL (ref 8.3–10.1)
CALCIUM SERPL-MCNC: 9.6 MG/DL (ref 8.3–10.1)
CHLORIDE SERPL-SCNC: 92 MMOL/L (ref 100–108)
CO2 SERPL-SCNC: 27 MMOL/L (ref 21–32)
CREAT SERPL-MCNC: 1.02 MG/DL (ref 0.6–1.3)
EOSINOPHIL # BLD AUTO: 0.91 THOUSAND/ΜL (ref 0–0.61)
EOSINOPHIL NFR BLD AUTO: 9 % (ref 0–6)
ERYTHROCYTE [DISTWIDTH] IN BLOOD BY AUTOMATED COUNT: 11.4 % (ref 11.6–15.1)
GFR SERPL CREATININE-BSD FRML MDRD: 75 ML/MIN/1.73SQ M
GLUCOSE SERPL-MCNC: 111 MG/DL (ref 65–140)
HCT VFR BLD AUTO: 38.6 % (ref 36.5–49.3)
HGB BLD-MCNC: 13.6 G/DL (ref 12–17)
IMM GRANULOCYTES # BLD AUTO: 0.04 THOUSAND/UL (ref 0–0.2)
IMM GRANULOCYTES NFR BLD AUTO: 0 % (ref 0–2)
LYMPHOCYTES # BLD AUTO: 4.26 THOUSANDS/ΜL (ref 0.6–4.47)
LYMPHOCYTES NFR BLD AUTO: 40 % (ref 14–44)
MCH RBC QN AUTO: 31.8 PG (ref 26.8–34.3)
MCHC RBC AUTO-ENTMCNC: 35.2 G/DL (ref 31.4–37.4)
MCV RBC AUTO: 90 FL (ref 82–98)
MONOCYTES # BLD AUTO: 1.05 THOUSAND/ΜL (ref 0.17–1.22)
MONOCYTES NFR BLD AUTO: 10 % (ref 4–12)
NEUTROPHILS # BLD AUTO: 4.22 THOUSANDS/ΜL (ref 1.85–7.62)
NEUTS SEG NFR BLD AUTO: 40 % (ref 43–75)
NRBC BLD AUTO-RTO: 0 /100 WBCS
P AXIS: 6 DEGREES
PLATELET # BLD AUTO: 309 THOUSANDS/UL (ref 149–390)
PMV BLD AUTO: 9.7 FL (ref 8.9–12.7)
POTASSIUM SERPL-SCNC: 4.6 MMOL/L (ref 3.5–5.3)
PR INTERVAL: 168 MS
PROT SERPL-MCNC: 7.9 G/DL (ref 6.4–8.2)
QRS AXIS: 74 DEGREES
QRSD INTERVAL: 84 MS
QT INTERVAL: 494 MS
QTC INTERVAL: 472 MS
RBC # BLD AUTO: 4.28 MILLION/UL (ref 3.88–5.62)
SODIUM SERPL-SCNC: 129 MMOL/L (ref 136–145)
T WAVE AXIS: 77 DEGREES
VENTRICULAR RATE: 55 BPM
WBC # BLD AUTO: 10.61 THOUSAND/UL (ref 4.31–10.16)

## 2020-10-07 PROCEDURE — 70551 MRI BRAIN STEM W/O DYE: CPT

## 2020-10-07 PROCEDURE — 93005 ELECTROCARDIOGRAM TRACING: CPT

## 2020-10-07 PROCEDURE — G1004 CDSM NDSC: HCPCS

## 2020-10-07 PROCEDURE — 99285 EMERGENCY DEPT VISIT HI MDM: CPT | Performed by: EMERGENCY MEDICINE

## 2020-10-07 PROCEDURE — 85025 COMPLETE CBC W/AUTO DIFF WBC: CPT | Performed by: EMERGENCY MEDICINE

## 2020-10-07 PROCEDURE — 96374 THER/PROPH/DIAG INJ IV PUSH: CPT

## 2020-10-07 PROCEDURE — 96375 TX/PRO/DX INJ NEW DRUG ADDON: CPT

## 2020-10-07 PROCEDURE — 96361 HYDRATE IV INFUSION ADD-ON: CPT

## 2020-10-07 PROCEDURE — 93010 ELECTROCARDIOGRAM REPORT: CPT | Performed by: INTERNAL MEDICINE

## 2020-10-07 PROCEDURE — 70496 CT ANGIOGRAPHY HEAD: CPT

## 2020-10-07 PROCEDURE — 36415 COLL VENOUS BLD VENIPUNCTURE: CPT | Performed by: EMERGENCY MEDICINE

## 2020-10-07 PROCEDURE — 80053 COMPREHEN METABOLIC PANEL: CPT | Performed by: EMERGENCY MEDICINE

## 2020-10-07 PROCEDURE — 99223 1ST HOSP IP/OBS HIGH 75: CPT | Performed by: INTERNAL MEDICINE

## 2020-10-07 PROCEDURE — 70498 CT ANGIOGRAPHY NECK: CPT

## 2020-10-07 PROCEDURE — 99285 EMERGENCY DEPT VISIT HI MDM: CPT

## 2020-10-07 RX ORDER — DIPHENHYDRAMINE HYDROCHLORIDE 50 MG/ML
25 INJECTION INTRAMUSCULAR; INTRAVENOUS ONCE
Status: COMPLETED | OUTPATIENT
Start: 2020-10-07 | End: 2020-10-07

## 2020-10-07 RX ORDER — ASPIRIN 81 MG/1
81 TABLET, CHEWABLE ORAL DAILY
Status: DISCONTINUED | OUTPATIENT
Start: 2020-10-08 | End: 2020-10-11 | Stop reason: HOSPADM

## 2020-10-07 RX ORDER — ACETAMINOPHEN 325 MG/1
650 TABLET ORAL ONCE
Status: COMPLETED | OUTPATIENT
Start: 2020-10-07 | End: 2020-10-07

## 2020-10-07 RX ORDER — ATORVASTATIN CALCIUM 40 MG/1
40 TABLET, FILM COATED ORAL EVERY EVENING
Status: DISCONTINUED | OUTPATIENT
Start: 2020-10-07 | End: 2020-10-11 | Stop reason: HOSPADM

## 2020-10-07 RX ORDER — NICOTINE 21 MG/24HR
1 PATCH, TRANSDERMAL 24 HOURS TRANSDERMAL DAILY
Status: DISCONTINUED | OUTPATIENT
Start: 2020-10-08 | End: 2020-10-11 | Stop reason: HOSPADM

## 2020-10-07 RX ORDER — METOCLOPRAMIDE HYDROCHLORIDE 5 MG/ML
10 INJECTION INTRAMUSCULAR; INTRAVENOUS ONCE
Status: COMPLETED | OUTPATIENT
Start: 2020-10-07 | End: 2020-10-07

## 2020-10-07 RX ADMIN — IOHEXOL 85 ML: 350 INJECTION, SOLUTION INTRAVENOUS at 14:34

## 2020-10-07 RX ADMIN — ACETAMINOPHEN 650 MG: 325 TABLET, FILM COATED ORAL at 12:50

## 2020-10-07 RX ADMIN — METOCLOPRAMIDE HYDROCHLORIDE 10 MG: 5 INJECTION INTRAMUSCULAR; INTRAVENOUS at 12:50

## 2020-10-07 RX ADMIN — DIPHENHYDRAMINE HYDROCHLORIDE 25 MG: 50 INJECTION, SOLUTION INTRAMUSCULAR; INTRAVENOUS at 12:50

## 2020-10-07 RX ADMIN — SODIUM CHLORIDE 1000 ML: 0.9 INJECTION, SOLUTION INTRAVENOUS at 12:50

## 2020-10-07 RX ADMIN — ATORVASTATIN CALCIUM 40 MG: 40 TABLET, FILM COATED ORAL at 17:29

## 2020-10-08 ENCOUNTER — APPOINTMENT (INPATIENT)
Dept: NON INVASIVE DIAGNOSTICS | Facility: HOSPITAL | Age: 70
DRG: 644 | End: 2020-10-08

## 2020-10-08 PROBLEM — E11.9 TYPE 2 DIABETES MELLITUS, WITHOUT LONG-TERM CURRENT USE OF INSULIN (HCC): Status: ACTIVE | Noted: 2020-10-08

## 2020-10-08 PROBLEM — E78.5 HYPERLIPIDEMIA: Status: ACTIVE | Noted: 2020-10-08

## 2020-10-08 PROBLEM — R51.9 UNILATERAL HEADACHE: Status: ACTIVE | Noted: 2020-10-08

## 2020-10-08 PROBLEM — E10.9 NEW ONSET OF DIABETES MELLITUS IN PEDIATRIC PATIENT (HCC): Status: ACTIVE | Noted: 2020-10-08

## 2020-10-08 LAB
ANION GAP SERPL CALCULATED.3IONS-SCNC: 12 MMOL/L (ref 4–13)
BUN SERPL-MCNC: 17 MG/DL (ref 5–25)
CALCIUM SERPL-MCNC: 8.9 MG/DL (ref 8.3–10.1)
CHLORIDE SERPL-SCNC: 91 MMOL/L (ref 100–108)
CHOLEST SERPL-MCNC: 249 MG/DL (ref 50–200)
CO2 SERPL-SCNC: 24 MMOL/L (ref 21–32)
CREAT SERPL-MCNC: 0.87 MG/DL (ref 0.6–1.3)
CRP SERPL QL: 7.7 MG/L
ERYTHROCYTE [DISTWIDTH] IN BLOOD BY AUTOMATED COUNT: 11.3 % (ref 11.6–15.1)
ERYTHROCYTE [SEDIMENTATION RATE] IN BLOOD: 44 MM/HOUR (ref 0–19)
EST. AVERAGE GLUCOSE BLD GHB EST-MCNC: 151 MG/DL
GFR SERPL CREATININE-BSD FRML MDRD: 89 ML/MIN/1.73SQ M
GLUCOSE SERPL-MCNC: 100 MG/DL (ref 65–140)
GLUCOSE SERPL-MCNC: 109 MG/DL (ref 65–140)
GLUCOSE SERPL-MCNC: 76 MG/DL (ref 65–140)
HBA1C MFR BLD: 6.9 %
HCT VFR BLD AUTO: 38.7 % (ref 36.5–49.3)
HDLC SERPL-MCNC: 28 MG/DL
HGB BLD-MCNC: 13.6 G/DL (ref 12–17)
LDLC SERPL CALC-MCNC: 202 MG/DL (ref 0–100)
MCH RBC QN AUTO: 31.6 PG (ref 26.8–34.3)
MCHC RBC AUTO-ENTMCNC: 35.1 G/DL (ref 31.4–37.4)
MCV RBC AUTO: 90 FL (ref 82–98)
PLATELET # BLD AUTO: 303 THOUSANDS/UL (ref 149–390)
PMV BLD AUTO: 9.2 FL (ref 8.9–12.7)
POTASSIUM SERPL-SCNC: 4.2 MMOL/L (ref 3.5–5.3)
RBC # BLD AUTO: 4.3 MILLION/UL (ref 3.88–5.62)
SODIUM SERPL-SCNC: 127 MMOL/L (ref 136–145)
T4 FREE SERPL-MCNC: 0.54 NG/DL (ref 0.76–1.46)
TRIGL SERPL-MCNC: 96 MG/DL
TSH SERPL DL<=0.05 MIU/L-ACNC: 0.29 UIU/ML (ref 0.36–3.74)
WBC # BLD AUTO: 8.82 THOUSAND/UL (ref 4.31–10.16)

## 2020-10-08 PROCEDURE — 85652 RBC SED RATE AUTOMATED: CPT | Performed by: INTERNAL MEDICINE

## 2020-10-08 PROCEDURE — 82533 TOTAL CORTISOL: CPT | Performed by: INTERNAL MEDICINE

## 2020-10-08 PROCEDURE — 97161 PT EVAL LOW COMPLEX 20 MIN: CPT

## 2020-10-08 PROCEDURE — 80048 BASIC METABOLIC PNL TOTAL CA: CPT | Performed by: INTERNAL MEDICINE

## 2020-10-08 PROCEDURE — 93306 TTE W/DOPPLER COMPLETE: CPT | Performed by: INTERNAL MEDICINE

## 2020-10-08 PROCEDURE — 82948 REAGENT STRIP/BLOOD GLUCOSE: CPT

## 2020-10-08 PROCEDURE — 99233 SBSQ HOSP IP/OBS HIGH 50: CPT | Performed by: INTERNAL MEDICINE

## 2020-10-08 PROCEDURE — 80061 LIPID PANEL: CPT | Performed by: INTERNAL MEDICINE

## 2020-10-08 PROCEDURE — 85027 COMPLETE CBC AUTOMATED: CPT | Performed by: INTERNAL MEDICINE

## 2020-10-08 PROCEDURE — 93306 TTE W/DOPPLER COMPLETE: CPT

## 2020-10-08 PROCEDURE — 84439 ASSAY OF FREE THYROXINE: CPT | Performed by: INTERNAL MEDICINE

## 2020-10-08 PROCEDURE — 83036 HEMOGLOBIN GLYCOSYLATED A1C: CPT | Performed by: INTERNAL MEDICINE

## 2020-10-08 PROCEDURE — 84443 ASSAY THYROID STIM HORMONE: CPT | Performed by: INTERNAL MEDICINE

## 2020-10-08 PROCEDURE — 86140 C-REACTIVE PROTEIN: CPT | Performed by: INTERNAL MEDICINE

## 2020-10-08 RX ORDER — ACETAMINOPHEN 325 MG/1
650 TABLET ORAL EVERY 6 HOURS PRN
Status: DISCONTINUED | OUTPATIENT
Start: 2020-10-08 | End: 2020-10-11 | Stop reason: HOSPADM

## 2020-10-08 RX ADMIN — ASPIRIN 81 MG: 81 TABLET, CHEWABLE ORAL at 08:47

## 2020-10-08 RX ADMIN — ATORVASTATIN CALCIUM 40 MG: 40 TABLET, FILM COATED ORAL at 17:00

## 2020-10-08 RX ADMIN — ACETAMINOPHEN 650 MG: 325 TABLET, FILM COATED ORAL at 21:59

## 2020-10-08 RX ADMIN — ENOXAPARIN SODIUM 40 MG: 40 INJECTION SUBCUTANEOUS at 08:45

## 2020-10-08 RX ADMIN — ACETAMINOPHEN 650 MG: 325 TABLET, FILM COATED ORAL at 03:10

## 2020-10-09 PROBLEM — E27.49 HYPOCORTISOLISM (HCC): Status: ACTIVE | Noted: 2020-10-09

## 2020-10-09 PROBLEM — I16.0 HYPERTENSIVE URGENCY: Status: RESOLVED | Noted: 2020-10-07 | Resolved: 2020-10-09

## 2020-10-09 LAB
ANION GAP SERPL CALCULATED.3IONS-SCNC: 8 MMOL/L (ref 4–13)
BUN SERPL-MCNC: 18 MG/DL (ref 5–25)
CALCIUM SERPL-MCNC: 9.6 MG/DL (ref 8.3–10.1)
CHLORIDE SERPL-SCNC: 92 MMOL/L (ref 100–108)
CO2 SERPL-SCNC: 27 MMOL/L (ref 21–32)
CORTIS SERPL-MCNC: <0.5 UG/DL
CREAT SERPL-MCNC: 0.88 MG/DL (ref 0.6–1.3)
GFR SERPL CREATININE-BSD FRML MDRD: 88 ML/MIN/1.73SQ M
GLUCOSE SERPL-MCNC: 101 MG/DL (ref 65–140)
GLUCOSE SERPL-MCNC: 104 MG/DL (ref 65–140)
GLUCOSE SERPL-MCNC: 85 MG/DL (ref 65–140)
GLUCOSE SERPL-MCNC: 86 MG/DL (ref 65–140)
GLUCOSE SERPL-MCNC: 93 MG/DL (ref 65–140)
OSMOLALITY UR: 614 MMOL/KG
POTASSIUM SERPL-SCNC: 4.5 MMOL/L (ref 3.5–5.3)
SODIUM 24H UR-SCNC: 99 MOL/L
SODIUM SERPL-SCNC: 127 MMOL/L (ref 136–145)
T3 SERPL-MCNC: 0.4 NG/ML (ref 0.6–1.8)
T3FREE SERPL-MCNC: 1.31 PG/ML (ref 2.3–4.2)
TSH SERPL DL<=0.05 MIU/L-ACNC: 0.32 UIU/ML (ref 0.36–3.74)
URATE UR-MCNC: 44.5 MG/DL
VIT B12 SERPL-MCNC: 540 PG/ML (ref 100–900)

## 2020-10-09 PROCEDURE — 84480 ASSAY TRIIODOTHYRONINE (T3): CPT | Performed by: INTERNAL MEDICINE

## 2020-10-09 PROCEDURE — 83935 ASSAY OF URINE OSMOLALITY: CPT | Performed by: INTERNAL MEDICINE

## 2020-10-09 PROCEDURE — 84443 ASSAY THYROID STIM HORMONE: CPT | Performed by: INTERNAL MEDICINE

## 2020-10-09 PROCEDURE — 99233 SBSQ HOSP IP/OBS HIGH 50: CPT | Performed by: INTERNAL MEDICINE

## 2020-10-09 PROCEDURE — 99255 IP/OBS CONSLTJ NEW/EST HI 80: CPT | Performed by: PSYCHIATRY & NEUROLOGY

## 2020-10-09 PROCEDURE — 84300 ASSAY OF URINE SODIUM: CPT | Performed by: INTERNAL MEDICINE

## 2020-10-09 PROCEDURE — 84481 FREE ASSAY (FT-3): CPT | Performed by: INTERNAL MEDICINE

## 2020-10-09 PROCEDURE — 84432 ASSAY OF THYROGLOBULIN: CPT | Performed by: INTERNAL MEDICINE

## 2020-10-09 PROCEDURE — 84560 ASSAY OF URINE/URIC ACID: CPT | Performed by: INTERNAL MEDICINE

## 2020-10-09 PROCEDURE — 82533 TOTAL CORTISOL: CPT | Performed by: INTERNAL MEDICINE

## 2020-10-09 PROCEDURE — 82948 REAGENT STRIP/BLOOD GLUCOSE: CPT

## 2020-10-09 PROCEDURE — 99254 IP/OBS CNSLTJ NEW/EST MOD 60: CPT | Performed by: INTERNAL MEDICINE

## 2020-10-09 PROCEDURE — 80048 BASIC METABOLIC PNL TOTAL CA: CPT | Performed by: INTERNAL MEDICINE

## 2020-10-09 PROCEDURE — 82024 ASSAY OF ACTH: CPT | Performed by: INTERNAL MEDICINE

## 2020-10-09 PROCEDURE — 82607 VITAMIN B-12: CPT | Performed by: PHYSICIAN ASSISTANT

## 2020-10-09 PROCEDURE — 86800 THYROGLOBULIN ANTIBODY: CPT | Performed by: INTERNAL MEDICINE

## 2020-10-09 RX ORDER — LANOLIN ALCOHOL/MO/W.PET/CERES
3 CREAM (GRAM) TOPICAL
Status: DISCONTINUED | OUTPATIENT
Start: 2020-10-09 | End: 2020-10-11 | Stop reason: HOSPADM

## 2020-10-09 RX ORDER — COSYNTROPIN 0.25 MG/ML
0.25 INJECTION, POWDER, FOR SOLUTION INTRAMUSCULAR; INTRAVENOUS ONCE
Status: COMPLETED | OUTPATIENT
Start: 2020-10-09 | End: 2020-10-09

## 2020-10-09 RX ADMIN — MELATONIN 3 MG: 3 TAB ORAL at 22:13

## 2020-10-09 RX ADMIN — ATORVASTATIN CALCIUM 40 MG: 40 TABLET, FILM COATED ORAL at 18:52

## 2020-10-09 RX ADMIN — ENOXAPARIN SODIUM 40 MG: 40 INJECTION SUBCUTANEOUS at 08:40

## 2020-10-09 RX ADMIN — ASPIRIN 81 MG: 81 TABLET, CHEWABLE ORAL at 08:39

## 2020-10-09 RX ADMIN — COSYNTROPIN 0.25 MG: 0.25 INJECTION, POWDER, LYOPHILIZED, FOR SOLUTION INTRAMUSCULAR; INTRAVENOUS at 15:38

## 2020-10-10 PROBLEM — K59.00 CONSTIPATION: Status: ACTIVE | Noted: 2020-10-10

## 2020-10-10 PROBLEM — E27.1 ADRENAL INSUFFICIENCY (ADDISON'S DISEASE) (HCC): Status: ACTIVE | Noted: 2020-10-10

## 2020-10-10 PROBLEM — D72.829 LEUKOCYTOSIS: Status: RESOLVED | Noted: 2020-10-07 | Resolved: 2020-10-10

## 2020-10-10 PROBLEM — R29.90 STROKE-LIKE SYMPTOMS: Status: RESOLVED | Noted: 2020-10-07 | Resolved: 2020-10-10

## 2020-10-10 LAB
ACTH PLAS-MCNC: 10.6 PG/ML (ref 7.2–63.3)
ACTH PLAS-MCNC: 7.3 PG/ML (ref 7.2–63.3)
ALBUMIN SERPL BCP-MCNC: 3.3 G/DL (ref 3.5–5)
ALP SERPL-CCNC: 54 U/L (ref 46–116)
ALT SERPL W P-5'-P-CCNC: 20 U/L (ref 12–78)
ANION GAP SERPL CALCULATED.3IONS-SCNC: 7 MMOL/L (ref 4–13)
ANION GAP SERPL CALCULATED.3IONS-SCNC: 7 MMOL/L (ref 4–13)
AST SERPL W P-5'-P-CCNC: 22 U/L (ref 5–45)
BASOPHILS # BLD AUTO: 0.08 THOUSANDS/ΜL (ref 0–0.1)
BASOPHILS NFR BLD AUTO: 1 % (ref 0–1)
BILIRUB SERPL-MCNC: 0.5 MG/DL (ref 0.2–1)
BUN SERPL-MCNC: 16 MG/DL (ref 5–25)
BUN SERPL-MCNC: 17 MG/DL (ref 5–25)
CALCIUM ALBUM COR SERPL-MCNC: 9.6 MG/DL (ref 8.3–10.1)
CALCIUM SERPL-MCNC: 8.8 MG/DL (ref 8.3–10.1)
CALCIUM SERPL-MCNC: 9 MG/DL (ref 8.3–10.1)
CHLORIDE SERPL-SCNC: 89 MMOL/L (ref 100–108)
CHLORIDE SERPL-SCNC: 89 MMOL/L (ref 100–108)
CO2 SERPL-SCNC: 27 MMOL/L (ref 21–32)
CO2 SERPL-SCNC: 28 MMOL/L (ref 21–32)
CORTIS SERPL-MCNC: 0.7 UG/DL
CORTIS SERPL-MCNC: 5.9 UG/DL
CORTIS SERPL-MCNC: 7.5 UG/DL
CREAT SERPL-MCNC: 0.84 MG/DL (ref 0.6–1.3)
CREAT SERPL-MCNC: 0.88 MG/DL (ref 0.6–1.3)
EOSINOPHIL # BLD AUTO: 0.8 THOUSAND/ΜL (ref 0–0.61)
EOSINOPHIL NFR BLD AUTO: 9 % (ref 0–6)
ERYTHROCYTE [DISTWIDTH] IN BLOOD BY AUTOMATED COUNT: 11 % (ref 11.6–15.1)
GFR SERPL CREATININE-BSD FRML MDRD: 88 ML/MIN/1.73SQ M
GFR SERPL CREATININE-BSD FRML MDRD: 90 ML/MIN/1.73SQ M
GLUCOSE SERPL-MCNC: 106 MG/DL (ref 65–140)
GLUCOSE SERPL-MCNC: 122 MG/DL (ref 65–140)
GLUCOSE SERPL-MCNC: 79 MG/DL (ref 65–140)
GLUCOSE SERPL-MCNC: 81 MG/DL (ref 65–140)
GLUCOSE SERPL-MCNC: 92 MG/DL (ref 65–140)
GLUCOSE SERPL-MCNC: 96 MG/DL (ref 65–140)
HCT VFR BLD AUTO: 36 % (ref 36.5–49.3)
HGB BLD-MCNC: 12.8 G/DL (ref 12–17)
IMM GRANULOCYTES # BLD AUTO: 0.02 THOUSAND/UL (ref 0–0.2)
IMM GRANULOCYTES NFR BLD AUTO: 0 % (ref 0–2)
LYMPHOCYTES # BLD AUTO: 2.29 THOUSANDS/ΜL (ref 0.6–4.47)
LYMPHOCYTES NFR BLD AUTO: 27 % (ref 14–44)
MCH RBC QN AUTO: 31.8 PG (ref 26.8–34.3)
MCHC RBC AUTO-ENTMCNC: 35.6 G/DL (ref 31.4–37.4)
MCV RBC AUTO: 90 FL (ref 82–98)
MONOCYTES # BLD AUTO: 0.93 THOUSAND/ΜL (ref 0.17–1.22)
MONOCYTES NFR BLD AUTO: 11 % (ref 4–12)
NEUTROPHILS # BLD AUTO: 4.36 THOUSANDS/ΜL (ref 1.85–7.62)
NEUTS SEG NFR BLD AUTO: 52 % (ref 43–75)
NRBC BLD AUTO-RTO: 0 /100 WBCS
OSMOLALITY UR/SERPL-RTO: 260 MMOL/KG (ref 282–298)
PLATELET # BLD AUTO: 249 THOUSANDS/UL (ref 149–390)
PMV BLD AUTO: 8.8 FL (ref 8.9–12.7)
POTASSIUM SERPL-SCNC: 4.4 MMOL/L (ref 3.5–5.3)
POTASSIUM SERPL-SCNC: 4.6 MMOL/L (ref 3.5–5.3)
PROT SERPL-MCNC: 7.4 G/DL (ref 6.4–8.2)
RBC # BLD AUTO: 4.02 MILLION/UL (ref 3.88–5.62)
SODIUM SERPL-SCNC: 123 MMOL/L (ref 136–145)
SODIUM SERPL-SCNC: 124 MMOL/L (ref 136–145)
THYROGLOB AB SERPL-ACNC: <1 IU/ML (ref 0–0.9)
THYROGLOB SERPL-MCNC: 7.9 NG/ML (ref 1.4–29.2)
WBC # BLD AUTO: 8.48 THOUSAND/UL (ref 4.31–10.16)

## 2020-10-10 PROCEDURE — 99233 SBSQ HOSP IP/OBS HIGH 50: CPT | Performed by: INTERNAL MEDICINE

## 2020-10-10 PROCEDURE — 83930 ASSAY OF BLOOD OSMOLALITY: CPT | Performed by: INTERNAL MEDICINE

## 2020-10-10 PROCEDURE — 80053 COMPREHEN METABOLIC PANEL: CPT | Performed by: INTERNAL MEDICINE

## 2020-10-10 PROCEDURE — 85025 COMPLETE CBC W/AUTO DIFF WBC: CPT | Performed by: INTERNAL MEDICINE

## 2020-10-10 PROCEDURE — 82948 REAGENT STRIP/BLOOD GLUCOSE: CPT

## 2020-10-10 PROCEDURE — 80048 BASIC METABOLIC PNL TOTAL CA: CPT | Performed by: INTERNAL MEDICINE

## 2020-10-10 PROCEDURE — 99254 IP/OBS CNSLTJ NEW/EST MOD 60: CPT | Performed by: INTERNAL MEDICINE

## 2020-10-10 RX ORDER — BISACODYL 10 MG
10 SUPPOSITORY, RECTAL RECTAL DAILY PRN
Status: DISCONTINUED | OUTPATIENT
Start: 2020-10-10 | End: 2020-10-11 | Stop reason: HOSPADM

## 2020-10-10 RX ORDER — HYDROCORTISONE 5 MG/1
5 TABLET ORAL EVERY EVENING
Status: DISCONTINUED | OUTPATIENT
Start: 2020-10-10 | End: 2020-10-11 | Stop reason: HOSPADM

## 2020-10-10 RX ORDER — AMOXICILLIN 250 MG
1 CAPSULE ORAL
Status: DISCONTINUED | OUTPATIENT
Start: 2020-10-10 | End: 2020-10-11 | Stop reason: HOSPADM

## 2020-10-10 RX ORDER — SODIUM CHLORIDE 9 MG/ML
75 INJECTION, SOLUTION INTRAVENOUS CONTINUOUS
Status: DISCONTINUED | OUTPATIENT
Start: 2020-10-10 | End: 2020-10-10

## 2020-10-10 RX ORDER — SODIUM CHLORIDE 1000 MG
2 TABLET, SOLUBLE MISCELLANEOUS
Status: DISCONTINUED | OUTPATIENT
Start: 2020-10-10 | End: 2020-10-11 | Stop reason: HOSPADM

## 2020-10-10 RX ORDER — POLYETHYLENE GLYCOL 3350 17 G/17G
17 POWDER, FOR SOLUTION ORAL DAILY
Status: DISCONTINUED | OUTPATIENT
Start: 2020-10-10 | End: 2020-10-11 | Stop reason: HOSPADM

## 2020-10-10 RX ADMIN — Medication 2 G: at 09:58

## 2020-10-10 RX ADMIN — Medication 2 G: at 12:59

## 2020-10-10 RX ADMIN — ENOXAPARIN SODIUM 40 MG: 40 INJECTION SUBCUTANEOUS at 09:57

## 2020-10-10 RX ADMIN — MELATONIN 3 MG: 3 TAB ORAL at 21:21

## 2020-10-10 RX ADMIN — HYDROCORTISONE 15 MG: 10 TABLET ORAL at 13:04

## 2020-10-10 RX ADMIN — POLYETHYLENE GLYCOL 3350 17 G: 17 POWDER, FOR SOLUTION ORAL at 13:00

## 2020-10-10 RX ADMIN — DOCUSATE SODIUM AND SENNOSIDES 1 TABLET: 8.6; 5 TABLET, FILM COATED ORAL at 21:21

## 2020-10-10 RX ADMIN — ATORVASTATIN CALCIUM 40 MG: 40 TABLET, FILM COATED ORAL at 18:08

## 2020-10-10 RX ADMIN — ASPIRIN 81 MG: 81 TABLET, CHEWABLE ORAL at 09:58

## 2020-10-10 RX ADMIN — Medication 2 G: at 18:08

## 2020-10-10 RX ADMIN — HYDROCORTISONE 5 MG: 5 TABLET ORAL at 18:08

## 2020-10-11 VITALS
HEIGHT: 70 IN | HEART RATE: 59 BPM | DIASTOLIC BLOOD PRESSURE: 63 MMHG | RESPIRATION RATE: 18 BRPM | TEMPERATURE: 98.3 F | BODY MASS INDEX: 20.93 KG/M2 | OXYGEN SATURATION: 98 % | SYSTOLIC BLOOD PRESSURE: 120 MMHG | WEIGHT: 146.16 LBS

## 2020-10-11 LAB
ANION GAP SERPL CALCULATED.3IONS-SCNC: 8 MMOL/L (ref 4–13)
ANION GAP SERPL CALCULATED.3IONS-SCNC: 9 MMOL/L (ref 4–13)
BUN SERPL-MCNC: 17 MG/DL (ref 5–25)
BUN SERPL-MCNC: 18 MG/DL (ref 5–25)
CALCIUM SERPL-MCNC: 8.7 MG/DL (ref 8.3–10.1)
CALCIUM SERPL-MCNC: 9.2 MG/DL (ref 8.3–10.1)
CHLORIDE SERPL-SCNC: 89 MMOL/L (ref 100–108)
CHLORIDE SERPL-SCNC: 91 MMOL/L (ref 100–108)
CO2 SERPL-SCNC: 25 MMOL/L (ref 21–32)
CO2 SERPL-SCNC: 25 MMOL/L (ref 21–32)
CREAT SERPL-MCNC: 0.84 MG/DL (ref 0.6–1.3)
CREAT SERPL-MCNC: 0.86 MG/DL (ref 0.6–1.3)
GFR SERPL CREATININE-BSD FRML MDRD: 89 ML/MIN/1.73SQ M
GFR SERPL CREATININE-BSD FRML MDRD: 90 ML/MIN/1.73SQ M
GLUCOSE SERPL-MCNC: 179 MG/DL (ref 65–140)
GLUCOSE SERPL-MCNC: 76 MG/DL (ref 65–140)
GLUCOSE SERPL-MCNC: 76 MG/DL (ref 65–140)
GLUCOSE SERPL-MCNC: 95 MG/DL (ref 65–140)
POTASSIUM SERPL-SCNC: 4.2 MMOL/L (ref 3.5–5.3)
POTASSIUM SERPL-SCNC: 4.7 MMOL/L (ref 3.5–5.3)
SODIUM SERPL-SCNC: 122 MMOL/L (ref 136–145)
SODIUM SERPL-SCNC: 125 MMOL/L (ref 136–145)

## 2020-10-11 PROCEDURE — 82948 REAGENT STRIP/BLOOD GLUCOSE: CPT

## 2020-10-11 PROCEDURE — 80048 BASIC METABOLIC PNL TOTAL CA: CPT | Performed by: INTERNAL MEDICINE

## 2020-10-11 PROCEDURE — 99232 SBSQ HOSP IP/OBS MODERATE 35: CPT | Performed by: INTERNAL MEDICINE

## 2020-10-11 PROCEDURE — 99239 HOSP IP/OBS DSCHRG MGMT >30: CPT | Performed by: INTERNAL MEDICINE

## 2020-10-11 RX ORDER — AMOXICILLIN 250 MG
1 CAPSULE ORAL
Qty: 30 TABLET | Refills: 0 | Status: SHIPPED | OUTPATIENT
Start: 2020-10-11 | End: 2020-11-10

## 2020-10-11 RX ORDER — HYDROCORTISONE 5 MG/1
15 TABLET ORAL EVERY MORNING
Qty: 90 TABLET | Refills: 0 | Status: SHIPPED | OUTPATIENT
Start: 2020-10-11 | End: 2020-11-10

## 2020-10-11 RX ORDER — ATORVASTATIN CALCIUM 40 MG/1
40 TABLET, FILM COATED ORAL EVERY EVENING
Qty: 30 TABLET | Refills: 0 | Status: SHIPPED | OUTPATIENT
Start: 2020-10-11 | End: 2020-11-10

## 2020-10-11 RX ORDER — HYDROCORTISONE 5 MG/1
5 TABLET ORAL EVERY EVENING
Qty: 30 TABLET | Refills: 0 | Status: SHIPPED | OUTPATIENT
Start: 2020-10-11 | End: 2020-11-10

## 2020-10-11 RX ORDER — SODIUM CHLORIDE 1000 MG
2 TABLET, SOLUBLE MISCELLANEOUS
Qty: 180 TABLET | Refills: 0 | Status: SHIPPED | OUTPATIENT
Start: 2020-10-11 | End: 2020-11-10

## 2020-10-11 RX ORDER — ASPIRIN 81 MG/1
81 TABLET, CHEWABLE ORAL DAILY
Qty: 30 TABLET | Refills: 0 | Status: SHIPPED | OUTPATIENT
Start: 2020-10-11 | End: 2020-11-10

## 2020-10-11 RX ORDER — NICOTINE 21 MG/24HR
1 PATCH, TRANSDERMAL 24 HOURS TRANSDERMAL DAILY
Qty: 28 PATCH | Refills: 0 | Status: SHIPPED | OUTPATIENT
Start: 2020-10-12

## 2020-10-11 RX ADMIN — ASPIRIN 81 MG: 81 TABLET, CHEWABLE ORAL at 08:17

## 2020-10-11 RX ADMIN — Medication 2 G: at 08:18

## 2020-10-11 RX ADMIN — POLYETHYLENE GLYCOL 3350 17 G: 17 POWDER, FOR SOLUTION ORAL at 08:15

## 2020-10-11 RX ADMIN — INSULIN LISPRO 1 UNITS: 100 INJECTION, SOLUTION INTRAVENOUS; SUBCUTANEOUS at 12:00

## 2020-10-11 RX ADMIN — ENOXAPARIN SODIUM 40 MG: 40 INJECTION SUBCUTANEOUS at 08:16

## 2020-10-11 RX ADMIN — Medication 2 G: at 12:41

## 2020-10-11 RX ADMIN — HYDROCORTISONE 15 MG: 10 TABLET ORAL at 08:18

## 2020-10-13 LAB — ACTH PLAS-MCNC: 17.1 PG/ML (ref 7.2–63.3)

## 2020-10-29 ENCOUNTER — TELEPHONE (OUTPATIENT)
Dept: NEPHROLOGY | Facility: CLINIC | Age: 70
End: 2020-10-29

## 2021-06-11 ENCOUNTER — EMERGENCY (EMERGENCY)
Facility: HOSPITAL | Age: 71
LOS: 1 days | Discharge: HOME | End: 2021-06-11
Attending: STUDENT IN AN ORGANIZED HEALTH CARE EDUCATION/TRAINING PROGRAM
Payer: SELF-PAY

## 2021-06-11 VITALS
OXYGEN SATURATION: 98 % | RESPIRATION RATE: 18 BRPM | DIASTOLIC BLOOD PRESSURE: 82 MMHG | HEART RATE: 61 BPM | SYSTOLIC BLOOD PRESSURE: 182 MMHG | WEIGHT: 147.71 LBS | TEMPERATURE: 98 F

## 2021-06-11 DIAGNOSIS — Z90.49 ACQUIRED ABSENCE OF OTHER SPECIFIED PARTS OF DIGESTIVE TRACT: Chronic | ICD-10-CM

## 2021-06-11 LAB
ALBUMIN SERPL ELPH-MCNC: 4.1 G/DL — SIGNIFICANT CHANGE UP (ref 3.5–5)
ANION GAP SERPL CALC-SCNC: 9 MMOL/L — SIGNIFICANT CHANGE UP (ref 5–17)
APTT BLD: 42.4 SEC — HIGH (ref 27.5–35.5)
BASOPHILS # BLD AUTO: 0.12 K/UL — SIGNIFICANT CHANGE UP (ref 0–0.2)
BASOPHILS NFR BLD AUTO: 1.1 % — SIGNIFICANT CHANGE UP (ref 0–2)
BUN SERPL-MCNC: 19 MG/DL — HIGH (ref 7–18)
CALCIUM SERPL-MCNC: 8.7 MG/DL — SIGNIFICANT CHANGE UP (ref 8.4–10.5)
CHLORIDE SERPL-SCNC: 103 MMOL/L — SIGNIFICANT CHANGE UP (ref 96–108)
CO2 SERPL-SCNC: 24 MMOL/L — SIGNIFICANT CHANGE UP (ref 22–31)
EOSINOPHIL # BLD AUTO: 0.22 K/UL — SIGNIFICANT CHANGE UP (ref 0–0.5)
EOSINOPHIL NFR BLD AUTO: 1.9 % — SIGNIFICANT CHANGE UP (ref 0–6)
GLUCOSE SERPL-MCNC: 128 MG/DL — HIGH (ref 70–99)
HCT VFR BLD CALC: 32.1 % — LOW (ref 39–50)
HGB BLD-MCNC: 11.1 G/DL — LOW (ref 13–17)
IMM GRANULOCYTES NFR BLD AUTO: 0.3 % — SIGNIFICANT CHANGE UP (ref 0–1.5)
INR BLD: 1.1 RATIO — SIGNIFICANT CHANGE UP (ref 0.88–1.16)
LYMPHOCYTES # BLD AUTO: 36.8 % — SIGNIFICANT CHANGE UP (ref 13–44)
LYMPHOCYTES # BLD AUTO: 4.15 K/UL — HIGH (ref 1–3.3)
MCHC RBC-ENTMCNC: 30.9 PG — SIGNIFICANT CHANGE UP (ref 27–34)
MCHC RBC-ENTMCNC: 34.6 GM/DL — SIGNIFICANT CHANGE UP (ref 32–36)
MCV RBC AUTO: 89.4 FL — SIGNIFICANT CHANGE UP (ref 80–100)
MONOCYTES # BLD AUTO: 0.91 K/UL — HIGH (ref 0–0.9)
MONOCYTES NFR BLD AUTO: 8.1 % — SIGNIFICANT CHANGE UP (ref 2–14)
NEUTROPHILS # BLD AUTO: 5.86 K/UL — SIGNIFICANT CHANGE UP (ref 1.8–7.4)
NEUTROPHILS NFR BLD AUTO: 51.8 % — SIGNIFICANT CHANGE UP (ref 43–77)
NRBC # BLD: 0 /100 WBCS — SIGNIFICANT CHANGE UP (ref 0–0)
PLATELET # BLD AUTO: 253 K/UL — SIGNIFICANT CHANGE UP (ref 150–400)
PROTHROM AB SERPL-ACNC: 13 SEC — SIGNIFICANT CHANGE UP (ref 10.6–13.6)
RBC # BLD: 3.59 M/UL — LOW (ref 4.2–5.8)
RBC # FLD: 12 % — SIGNIFICANT CHANGE UP (ref 10.3–14.5)
SODIUM SERPL-SCNC: 136 MMOL/L — SIGNIFICANT CHANGE UP (ref 135–145)
WBC # BLD: 11.29 K/UL — HIGH (ref 3.8–10.5)
WBC # FLD AUTO: 11.29 K/UL — HIGH (ref 3.8–10.5)

## 2021-06-11 PROCEDURE — 99284 EMERGENCY DEPT VISIT MOD MDM: CPT

## 2021-06-11 RX ORDER — SODIUM CHLORIDE 9 MG/ML
3 INJECTION INTRAMUSCULAR; INTRAVENOUS; SUBCUTANEOUS EVERY 8 HOURS
Refills: 0 | Status: DISCONTINUED | OUTPATIENT
Start: 2021-06-11 | End: 2021-06-15

## 2021-06-11 RX ADMIN — SODIUM CHLORIDE 3 MILLILITER(S): 9 INJECTION INTRAMUSCULAR; INTRAVENOUS; SUBCUTANEOUS at 23:35

## 2021-06-12 VITALS
OXYGEN SATURATION: 98 % | DIASTOLIC BLOOD PRESSURE: 74 MMHG | RESPIRATION RATE: 18 BRPM | TEMPERATURE: 97 F | HEART RATE: 64 BPM | SYSTOLIC BLOOD PRESSURE: 117 MMHG

## 2021-06-12 LAB
ALP SERPL-CCNC: 60 U/L — SIGNIFICANT CHANGE UP (ref 40–120)
ALT FLD-CCNC: 21 U/L DA — SIGNIFICANT CHANGE UP (ref 10–60)
AST SERPL-CCNC: 27 U/L — SIGNIFICANT CHANGE UP (ref 10–40)
BILIRUB SERPL-MCNC: 0.3 MG/DL — SIGNIFICANT CHANGE UP (ref 0.2–1.2)
CREAT SERPL-MCNC: 0.79 MG/DL — SIGNIFICANT CHANGE UP (ref 0.5–1.3)
POTASSIUM SERPL-MCNC: 4.8 MMOL/L — SIGNIFICANT CHANGE UP (ref 3.5–5.3)
POTASSIUM SERPL-SCNC: 4.8 MMOL/L — SIGNIFICANT CHANGE UP (ref 3.5–5.3)
PROT SERPL-MCNC: 8.1 G/DL — SIGNIFICANT CHANGE UP (ref 6–8.3)
TROPONIN I SERPL-MCNC: <0.015 NG/ML — SIGNIFICANT CHANGE UP (ref 0–0.04)

## 2021-06-12 PROCEDURE — 85025 COMPLETE CBC W/AUTO DIFF WBC: CPT

## 2021-06-12 PROCEDURE — 96361 HYDRATE IV INFUSION ADD-ON: CPT

## 2021-06-12 PROCEDURE — 70496 CT ANGIOGRAPHY HEAD: CPT

## 2021-06-12 PROCEDURE — 96375 TX/PRO/DX INJ NEW DRUG ADDON: CPT | Mod: XU

## 2021-06-12 PROCEDURE — 70498 CT ANGIOGRAPHY NECK: CPT

## 2021-06-12 PROCEDURE — 85730 THROMBOPLASTIN TIME PARTIAL: CPT

## 2021-06-12 PROCEDURE — 80053 COMPREHEN METABOLIC PANEL: CPT

## 2021-06-12 PROCEDURE — 96365 THER/PROPH/DIAG IV INF INIT: CPT | Mod: XU

## 2021-06-12 PROCEDURE — 36415 COLL VENOUS BLD VENIPUNCTURE: CPT

## 2021-06-12 PROCEDURE — 84484 ASSAY OF TROPONIN QUANT: CPT

## 2021-06-12 PROCEDURE — 70496 CT ANGIOGRAPHY HEAD: CPT | Mod: 26,MA

## 2021-06-12 PROCEDURE — 94640 AIRWAY INHALATION TREATMENT: CPT

## 2021-06-12 PROCEDURE — 70498 CT ANGIOGRAPHY NECK: CPT | Mod: 26,MA

## 2021-06-12 PROCEDURE — 99284 EMERGENCY DEPT VISIT MOD MDM: CPT | Mod: 25

## 2021-06-12 PROCEDURE — 85610 PROTHROMBIN TIME: CPT

## 2021-06-12 RX ORDER — METOCLOPRAMIDE HCL 10 MG
10 TABLET ORAL ONCE
Refills: 0 | Status: COMPLETED | OUTPATIENT
Start: 2021-06-12 | End: 2021-06-12

## 2021-06-12 RX ORDER — FLUTICASONE PROPIONATE 50 MCG
2 SPRAY, SUSPENSION NASAL ONCE
Refills: 0 | Status: COMPLETED | OUTPATIENT
Start: 2021-06-12 | End: 2021-06-12

## 2021-06-12 RX ORDER — KETOROLAC TROMETHAMINE 30 MG/ML
15 SYRINGE (ML) INJECTION ONCE
Refills: 0 | Status: DISCONTINUED | OUTPATIENT
Start: 2021-06-12 | End: 2021-06-12

## 2021-06-12 RX ORDER — IBUPROFEN 200 MG
1 TABLET ORAL
Qty: 15 | Refills: 0
Start: 2021-06-12 | End: 2021-06-16

## 2021-06-12 RX ORDER — TRAMADOL HYDROCHLORIDE 50 MG/1
1 TABLET ORAL
Qty: 12 | Refills: 0
Start: 2021-06-12 | End: 2021-06-15

## 2021-06-12 RX ORDER — OXYMETAZOLINE HYDROCHLORIDE 0.5 MG/ML
1 SPRAY NASAL
Qty: 1 | Refills: 0
Start: 2021-06-12 | End: 2021-06-14

## 2021-06-12 RX ORDER — FLUTICASONE PROPIONATE 50 MCG
1 SPRAY, SUSPENSION NASAL
Qty: 1 | Refills: 0
Start: 2021-06-12 | End: 2021-06-18

## 2021-06-12 RX ORDER — SODIUM CHLORIDE 9 MG/ML
1000 INJECTION INTRAMUSCULAR; INTRAVENOUS; SUBCUTANEOUS ONCE
Refills: 0 | Status: COMPLETED | OUTPATIENT
Start: 2021-06-12 | End: 2021-06-12

## 2021-06-12 RX ORDER — DEXAMETHASONE 0.5 MG/5ML
10 ELIXIR ORAL ONCE
Refills: 0 | Status: COMPLETED | OUTPATIENT
Start: 2021-06-12 | End: 2021-06-12

## 2021-06-12 RX ORDER — OXYMETAZOLINE HYDROCHLORIDE 0.5 MG/ML
2 SPRAY NASAL ONCE
Refills: 0 | Status: COMPLETED | OUTPATIENT
Start: 2021-06-12 | End: 2021-06-12

## 2021-06-12 RX ORDER — HYDRALAZINE HCL 50 MG
10 TABLET ORAL ONCE
Refills: 0 | Status: COMPLETED | OUTPATIENT
Start: 2021-06-12 | End: 2021-06-12

## 2021-06-12 RX ORDER — LABETALOL HCL 100 MG
5 TABLET ORAL ONCE
Refills: 0 | Status: DISCONTINUED | OUTPATIENT
Start: 2021-06-12 | End: 2021-06-12

## 2021-06-12 RX ADMIN — OXYMETAZOLINE HYDROCHLORIDE 2 SPRAY(S): 0.5 SPRAY NASAL at 09:30

## 2021-06-12 RX ADMIN — Medication 102 MILLIGRAM(S): at 09:09

## 2021-06-12 RX ADMIN — Medication 10 MILLIGRAM(S): at 01:39

## 2021-06-12 RX ADMIN — Medication 15 MILLIGRAM(S): at 08:22

## 2021-06-12 RX ADMIN — Medication 10 MILLIGRAM(S): at 09:36

## 2021-06-12 RX ADMIN — SODIUM CHLORIDE 3 MILLILITER(S): 9 INJECTION INTRAMUSCULAR; INTRAVENOUS; SUBCUTANEOUS at 06:09

## 2021-06-12 RX ADMIN — SODIUM CHLORIDE 1000 MILLILITER(S): 9 INJECTION INTRAMUSCULAR; INTRAVENOUS; SUBCUTANEOUS at 08:44

## 2021-06-12 RX ADMIN — Medication 15 MILLIGRAM(S): at 07:52

## 2021-06-12 RX ADMIN — Medication 2 SPRAY(S): at 09:30

## 2021-06-12 RX ADMIN — SODIUM CHLORIDE 1000 MILLILITER(S): 9 INJECTION INTRAMUSCULAR; INTRAVENOUS; SUBCUTANEOUS at 07:53

## 2021-06-12 RX ADMIN — Medication 10 MILLIGRAM(S): at 02:19

## 2021-06-12 NOTE — ED PROVIDER NOTE - CLINICAL SUMMARY MEDICAL DECISION MAKING FREE TEXT BOX
Pt p/w headache behind bilat eyes and blurry vision. CTA neg and labs showing WBC 11K and otw unremarkable. Pt initially given reglan with no relief. Will give fluids and toradol and sign out to incoming doc pending re-eval. Pt stable.

## 2021-06-12 NOTE — ED PROVIDER NOTE - OBJECTIVE STATEMENT
70 yo M h/o DM, HTN, ?intracranial hemorrhage (pt states that 2 years ago he believes he had bleeding in the brain though no surgeries performed or aneurysms) p/w frontal headache with intermittent blurry vision and no other associated symptoms. Pt states that this headache has been frontal and behind the eyes and does not feel like his previous hemorrhage as he is not dizzy and his neck feels normal. Pt took 800 mg ibuprofen at home with no relief. Pt denies recent fever or infectious symptoms, neck stiffness, chest pain, SOB, dizziness or syncope, N/V, blood thinners, trauma, other recent illness or hospitalizations.

## 2021-06-12 NOTE — ED PROVIDER NOTE - PHYSICAL EXAMINATION
Vital Signs Reviewed  GEN: Comfortable, Conversant in full sentences, NAD  HEENT: NCAT, PERRLA, MMM, Neck Supple  RESP: CTAB, No rales/rhonchi/wheezing  CV: RRR, S1S2, No murmurs  ABD: No TTP, ND, No masses  Extrem/Skin: Equal pulses bilat, No cyanosis/edema/rashes  Neuro: AAOx3, CNs Grossly Intact, Nml coordinating mvmts, Equal strength/sensation in all extremities bilat, No Pronator Drift, Nml Gait

## 2021-06-12 NOTE — ED PROVIDER NOTE - PATIENT PORTAL LINK FT
You can access the FollowMyHealth Patient Portal offered by [facility] by registering at the following website: [email or web address]. By joining [facility]’s FollowMyHealth portal, you will also be able to view your health information using other applications (apps) compatible with our system.

## 2021-06-12 NOTE — ED PROVIDER NOTE - NSFOLLOWUPINSTRUCTIONS_ED_ALL_ED_FT
Take the 2 NASAL SPRAYS as prescribed.    AFRIN you will take for only THREE DAYS!    FLUTICASONE you will take for 1 WEEK!    Take the IBPUPROFEN and TRAMADOL for pain as needed.      Symptoms should improve in the next 3 days. Follow up with your regular doctor.

## 2021-06-12 NOTE — ED PROVIDER NOTE - PROGRESS NOTE DETAILS
jarrett - pt with likely sinus ha. symptmos worsen when he bends forward quickly, feels like something is "shifting". not concerned for bleed, meningitis, or other more severe process. ok for dc home.

## 2021-06-13 PROBLEM — I10 ESSENTIAL (PRIMARY) HYPERTENSION: Chronic | Status: ACTIVE | Noted: 2018-02-16

## 2021-06-13 PROBLEM — I60.9 NONTRAUMATIC SUBARACHNOID HEMORRHAGE, UNSPECIFIED: Chronic | Status: ACTIVE | Noted: 2018-02-16

## 2021-06-13 PROBLEM — E11.9 TYPE 2 DIABETES MELLITUS WITHOUT COMPLICATIONS: Chronic | Status: ACTIVE | Noted: 2018-02-16

## 2021-06-13 PROBLEM — E78.5 HYPERLIPIDEMIA, UNSPECIFIED: Chronic | Status: ACTIVE | Noted: 2018-02-16

## 2021-09-23 NOTE — ED ADULT NURSE NOTE - CAS DISCH BELONGINGS RETURNED
Yes Solaraze Pregnancy And Lactation Text: This medication is Pregnancy Category B and is considered safe. There is some data to suggest avoiding during the third trimester. It is unknown if this medication is excreted in breast milk.

## 2022-09-07 NOTE — CONSULT NOTE ADULT - PROVIDER SPECIALTY LIST ADULT
Endocrinology Azithromycin Counseling:  I discussed with the patient the risks of azithromycin including but not limited to GI upset, allergic reaction, drug rash, diarrhea, and yeast infections.

## 2022-11-09 NOTE — BRIEF OPERATIVE NOTE - PRIMARY SURGEON
Dr. Rodriguez Last visit  3-  Next visit   11-    Last labs   GOT/AST (Units/L)   Date Value   09/14/2022 20     GPT/ALT (Units/L)   Date Value   09/14/2022 14     Creatinine (mg/dL)   Date Value   09/14/2022 0.87     CE checked
